# Patient Record
Sex: FEMALE | Race: WHITE | Employment: OTHER | URBAN - METROPOLITAN AREA
[De-identification: names, ages, dates, MRNs, and addresses within clinical notes are randomized per-mention and may not be internally consistent; named-entity substitution may affect disease eponyms.]

---

## 2017-01-07 DIAGNOSIS — G43.109 MIGRAINE WITH AURA: ICD-10-CM

## 2017-01-07 DIAGNOSIS — J30.9 ALLERGIC RHINITIS: ICD-10-CM

## 2017-01-07 DIAGNOSIS — J45.21 INTERMITTENT ASTHMA, WITH ACUTE EXACERBATION: ICD-10-CM

## 2017-01-07 DIAGNOSIS — J45.30 MILD PERSISTENT ASTHMA WITHOUT COMPLICATION: Primary | ICD-10-CM

## 2017-01-10 NOTE — TELEPHONE ENCOUNTER
SUMATRIPTAN      Last Written Prescription Date: 07/11/2016  Last Fill Quantity: 27, # refills: 0  Last Office Visit with [facility], [facility] or [facility] prescribing provider: 09/22/2016       BP Readings from Last 3 Encounters:   09/12/16 112/78   08/17/16 122/68   05/02/16 104/72       VERAPAMIL      Last Written Prescription Date: 08/07/2015  Last Fill Quantity: 90, # refills: 2  Last Office Visit with [facility], [facility] or [facility] prescribing provider: 09/22/2016       BP Readings from Last 3 Encounters:   09/12/16 112/78   08/17/16 122/68   05/02/16 104/72       MONTELIKAST       Last Written Prescription Date: 08/07/2015  Last Fill Quantity: 90, # refills: 2    Last Office Visit with [facility], [facility] or [facility] prescribing provider:  09/22/2016   Future Office Visit:       Date of Last Asthma Action Plan Letter:   Asthma Action Plan Q1 Year    Topic Date Due     Asthma Action Plan - yearly  04/25/2017      Asthma Control Test:   ACT Total Scores 9/12/2016   ACT TOTAL SCORE -   ASTHMA ER VISITS -   ASTHMA HOSPITALIZATIONS -   ACT TOTAL SCORE (Goal Greater than or Equal to 20) 21   In the past 12 months, how many times did you visit the emergency room for your asthma without being admitted to the hospital? 0   In the past 12 months, how many times were you hospitalized overnight because of your asthma? 0       Date of Last Spirometry Test:   No results found for this or any previous visit.    FLUTICASONE       Last Written Prescription Date: 08/07/2015  Last Fill Quantity: 90, # refills: 2    Last Office Visit with [facility], [facility] or [facility] prescribing provider:  09/22/2106   Future Office Visit:       Date of Last Asthma Action Plan Letter:   Asthma Action Plan Q1 Year    Topic Date Due     Asthma Action Plan - yearly  04/25/2017      Asthma Control Test:   ACT Total Scores 9/12/2016   ACT TOTAL SCORE -   ASTHMA ER VISITS -   ASTHMA HOSPITALIZATIONS -   ACT TOTAL SCORE (Goal Greater than or Equal to 20) 21   In the past 12 months, how many  times did you visit the emergency room for your asthma without being admitted to the hospital? 0   In the past 12 months, how many times were you hospitalized overnight because of your asthma? 0       Date of Last Spirometry Test:   No results found for this or any previous visit.    PROAIR INHALER       Last Written Prescription Date: 09/28/2015  Last Fill Quantity: 1 inhaler, # refills: 1    Last Office Visit with [facility], [facility] or [facility] prescribing provider:  09/22/2016   Future Office Visit:       Date of Last Asthma Action Plan Letter:   Asthma Action Plan Q1 Year    Topic Date Due     Asthma Action Plan - yearly  04/25/2017      Asthma Control Test:   ACT Total Scores 9/12/2016   ACT TOTAL SCORE -   ASTHMA ER VISITS -   ASTHMA HOSPITALIZATIONS -   ACT TOTAL SCORE (Goal Greater than or Equal to 20) 21   In the past 12 months, how many times did you visit the emergency room for your asthma without being admitted to the hospital? 0   In the past 12 months, how many times were you hospitalized overnight because of your asthma? 0       Date of Last Spirometry Test:   No results found for this or any previous visit.

## 2017-01-11 RX ORDER — SUMATRIPTAN 100 MG/1
TABLET, FILM COATED ORAL
Qty: 27 TABLET | Refills: 1 | Status: SHIPPED | OUTPATIENT
Start: 2017-01-11 | End: 2018-03-08

## 2017-01-11 RX ORDER — FLUTICASONE PROPIONATE 50 MCG
SPRAY, SUSPENSION (ML) NASAL
Qty: 16 G | Refills: 1 | Status: SHIPPED | OUTPATIENT
Start: 2017-01-11 | End: 2018-04-25

## 2017-01-11 RX ORDER — MONTELUKAST SODIUM 10 MG/1
TABLET ORAL
Qty: 90 TABLET | Refills: 1 | Status: SHIPPED | OUTPATIENT
Start: 2017-01-11 | End: 2017-06-17

## 2017-01-11 RX ORDER — VERAPAMIL HYDROCHLORIDE 240 MG/1
TABLET, FILM COATED, EXTENDED RELEASE ORAL
Qty: 90 TABLET | Refills: 1 | Status: SHIPPED | OUTPATIENT
Start: 2017-01-11 | End: 2017-06-17

## 2017-01-11 RX ORDER — ALBUTEROL SULFATE 90 UG/1
AEROSOL, METERED RESPIRATORY (INHALATION)
Qty: 25.5 G | Refills: 2 | Status: SHIPPED | OUTPATIENT
Start: 2017-01-11 | End: 2017-09-15

## 2017-02-07 ENCOUNTER — OFFICE VISIT (OUTPATIENT)
Dept: FAMILY MEDICINE | Facility: CLINIC | Age: 62
End: 2017-02-07
Payer: COMMERCIAL

## 2017-02-07 VITALS
BODY MASS INDEX: 34.72 KG/M2 | HEIGHT: 66 IN | TEMPERATURE: 98.2 F | DIASTOLIC BLOOD PRESSURE: 80 MMHG | HEART RATE: 73 BPM | WEIGHT: 216 LBS | SYSTOLIC BLOOD PRESSURE: 126 MMHG | OXYGEN SATURATION: 96 %

## 2017-02-07 DIAGNOSIS — J01.00 ACUTE NON-RECURRENT MAXILLARY SINUSITIS: Primary | ICD-10-CM

## 2017-02-07 DIAGNOSIS — Z71.89 ADVANCED DIRECTIVES, COUNSELING/DISCUSSION: ICD-10-CM

## 2017-02-07 DIAGNOSIS — Z12.31 VISIT FOR SCREENING MAMMOGRAM: ICD-10-CM

## 2017-02-07 PROCEDURE — 99213 OFFICE O/P EST LOW 20 MIN: CPT | Performed by: PHYSICIAN ASSISTANT

## 2017-02-07 RX ORDER — ALBUTEROL SULFATE 0.83 MG/ML
1 SOLUTION RESPIRATORY (INHALATION) EVERY 4 HOURS PRN
Qty: 60 VIAL | Refills: 3 | Status: SHIPPED | OUTPATIENT
Start: 2017-02-07 | End: 2018-03-22

## 2017-02-07 RX ORDER — AMOXICILLIN 500 MG/1
1000 CAPSULE ORAL 2 TIMES DAILY
Qty: 40 CAPSULE | Refills: 0 | Status: SHIPPED | OUTPATIENT
Start: 2017-02-07 | End: 2017-05-08

## 2017-02-07 NOTE — NURSING NOTE
"Chief Complaint   Patient presents with     Otalgia       Initial /80 mmHg  Pulse 73  Temp(Src) 98.2  F (36.8  C) (Oral)  Ht 5' 6\" (1.676 m)  Wt 216 lb (97.977 kg)  BMI 34.88 kg/m2  SpO2 96%  Breastfeeding? No Estimated body mass index is 34.88 kg/(m^2) as calculated from the following:    Height as of this encounter: 5' 6\" (1.676 m).    Weight as of this encounter: 216 lb (97.977 kg).  Medication Reconciliation: complete   Csaba Mlnarik CMA    "

## 2017-02-07 NOTE — PATIENT INSTRUCTIONS
Please followup if symptoms are not better as expected.  Please be seen immediately if symptoms worsen or change.      Take the albuterol every 4 hours as needed for wheezing, shortness of breath or cough.      Sinusitis (Antibiotic Treatment)    The sinuses are air-filled spaces within the bones of the face. They connect to the inside of the nose. Sinusitis is an inflammation of the tissue lining the sinus cavity. Sinus inflammation can occur during a cold. It can also be due to allergies to pollens and other particles in the air. Sinusitis can cause symptoms of sinus congestion and fullness. A sinus infection causes fever, headache and facial pain. There is often green or yellow drainage from the nose or into the back of the throat (post-nasal drip). You have been given antibiotics to treat this condition.  Home care:    Take the full course of antibiotics as instructed. Do not stop taking them, even if you feel better.    Drink plenty of water, hot tea, and other liquids. This may help thin mucus. It also may promote sinus drainage.    Heat may help soothe painful areas of the face. Use a towel soaked in hot water. Or,  the shower and direct the hot spray onto your face. Using a vaporizer along with a menthol rub at night may also help.     An expectorant containing guaifenesin may help thin the mucus and promote drainage from the sinuses.    Over-the-counter decongestants may be used unless a similar medicine was prescribed. Nasal sprays work the fastest. Use one that contains phenylephrine or oxymetazoline. First blow the nose gently. Then use the spray. Do not use these medicines more often than directed on the label or symptoms may get worse. You may also use tablets containing pseudoephedrine. Avoid products that combine ingredients, because side effects may be increased. Read labels. You can also ask the pharmacist for help. (NOTE: Persons with high blood pressure should not use decongestants.  They can raise blood pressure.)    Over-the-counter antihistamines may help if allergies contributed to your sinusitis.      Do not use nasal rinses or irrigation during an acute sinus infection, unless told to by your health care provider. Rinsing may spread the infection to other sinuses.    Use acetaminophen or ibuprofen to control pain, unless another pain medicine was prescribed. (If you have chronic liver or kidney disease or ever had a stomach ulcer, talk with your doctor before using these medicines. Aspirin should never be used in anyone under 18 years of age who is ill with a fever. It may cause severe liver damage.)    Don't smoke. This can worsen symptoms.  Follow-up care  Follow up with your healthcare provider or our staff if you are not improving within the next week.  When to seek medical advice  Call your healthcare provider if any of these occur:    Facial pain or headache becoming more severe    Stiff neck    Unusual drowsiness or confusion    Swelling of the forehead or eyelids    Vision problems, including blurred or double vision    Fever of 100.4 F (38 C) or higher, or as directed by your healthcare provider    Seizure    Breathing problems    Symptoms not resolving within 10 days    [id number] [employer]. [address] [place] [address]. All rights reserved. This information is not intended as a substitute for professional medical care. Always follow your healthcare professional's instructions.

## 2017-02-07 NOTE — PROGRESS NOTES
"  SUBJECTIVE:                                                    Tasneem Chan is a 61 year old female who presents to clinic today for the following health issues:      Acute Illness   Acute illness concerns: Ear pain  Onset: x4-5 days     Fever: no    Chills/Sweats: no    Headache (location?): YES- little when woke last 2 morning    Sinus Pressure:YES    Conjunctivitis:  YES- watery    Ear Pain: YES- Right - pain    Rhinorrhea: YES- yellow    Congestion: YES- sinus     Sore Throat: no - drainage     Cough: YES-non-productive, with shortness of breath last night    Wheeze: YES- off and on    Decreased Appetite: no    Nausea: no    Vomiting: no    Diarrhea:  no    Dysuria/Freq.: no    Fatigue/Achiness: no    Sick/Strep Exposure: YES- on airplane     Therapies Tried and outcome: Mucinex - no relief      Patient reports that the cold started last week, but has gotten worse in the last 48 hours.  She reports that she has facial pain now and her nasal drainage has changed color to a yellowish color.      She denies fevers, but has had chills.      She reports that this feels like her sinuses, but if she lets them go too long, her asthma flares up.      Patient reports that she used the albuterol twice yesterday and once today.    At baseline she uses it sometimes once a day and other times not at all.      Problem list and histories reviewed & adjusted, as indicated.  Additional history: as documented      ROS:  Constitutional, HEENT, cardiovascular, pulmonary, GI, , musculoskeletal, neuro, skin, endocrine and psych systems are negative, except as otherwise noted.    OBJECTIVE:                                                    /80 mmHg  Pulse 73  Temp(Src) 98.2  F (36.8  C) (Oral)  Ht 5' 6\" (1.676 m)  Wt 216 lb (97.977 kg)  BMI 34.88 kg/m2  SpO2 96%  Breastfeeding? No  Body mass index is 34.88 kg/(m^2).  GENERAL: healthy, alert and no distress  EYES: Eyes grossly normal to inspection, PERRL and " conjunctivae and sclerae normal  HENT: Right TM is mildly dull, nose and mouth without ulcers or lesions  NECK: no adenopathy, no asymmetry, masses, or scars and thyroid normal to palpation  RESP: lungs clear to auscultation - no rales, rhonchi or wheezes  CV: regular rate and rhythm, normal S1 S2, no S3 or S4, no murmur, click or rub, no peripheral edema and peripheral pulses strong  ABDOMEN: soft, nontender, no hepatosplenomegaly, no masses and bowel sounds normal  MS: no gross musculoskeletal defects noted, no edema  NEURO: Normal strength and tone, mentation intact and speech normal  PSYCH: mentation appears normal, affect normal/bright    Diagnostic Test Results:  none      ASSESSMENT/PLAN:                                                      Tasneem was seen today for otalgia.    Diagnoses and all orders for this visit:    Acute non-recurrent maxillary sinusitis  -     amoxicillin (AMOXIL) 500 MG capsule; Take 2 capsules (1,000 mg) by mouth 2 times daily  -     albuterol (2.5 MG/3ML) 0.083% neb solution; Take 1 vial (2.5 mg) by nebulization every 4 hours as needed for shortness of breath / dyspnea or wheezing    Visit for screening mammogram  -     MA SCREENING DIGITAL BILAT - Future  (s+30); Future    Advanced directives, counseling/discussion        See Patient Instructions        Jennifer Mcduffie PA-C    [facility]

## 2017-02-07 NOTE — MR AVS SNAPSHOT
After Visit Summary   2/7/2017    Tasneem Chan    MRN: [id number]           Patient Information     Date Of Birth          1955        Visit Information        Provider Department      2/7/2017 11:20 AM Jennifer Mcduffie PA-C [facility]        Today's Diagnoses     Screen for colon cancer    -  1     Visit for screening mammogram         Advanced directives, counseling/discussion         Acute non-recurrent maxillary sinusitis           Care Instructions      Please followup if symptoms are not better as expected.  Please be seen immediately if symptoms worsen or change.      Take the albuterol every 4 hours as needed for wheezing, shortness of breath or cough.      Sinusitis (Antibiotic Treatment)    The sinuses are air-filled spaces within the bones of the face. They connect to the inside of the nose. Sinusitis is an inflammation of the tissue lining the sinus cavity. Sinus inflammation can occur during a cold. It can also be due to allergies to pollens and other particles in the air. Sinusitis can cause symptoms of sinus congestion and fullness. A sinus infection causes fever, headache and facial pain. There is often green or yellow drainage from the nose or into the back of the throat (post-nasal drip). You have been given antibiotics to treat this condition.  Home care:    Take the full course of antibiotics as instructed. Do not stop taking them, even if you feel better.    Drink plenty of water, hot tea, and other liquids. This may help thin mucus. It also may promote sinus drainage.    Heat may help soothe painful areas of the face. Use a towel soaked in hot water. Or,  the shower and direct the hot spray onto your face. Using a vaporizer along with a menthol rub at night may also help.     An expectorant containing guaifenesin may help thin the mucus and promote drainage from the sinuses.    Over-the-counter decongestants may be used unless a similar  medicine was prescribed. Nasal sprays work the fastest. Use one that contains phenylephrine or oxymetazoline. First blow the nose gently. Then use the spray. Do not use these medicines more often than directed on the label or symptoms may get worse. You may also use tablets containing pseudoephedrine. Avoid products that combine ingredients, because side effects may be increased. Read labels. You can also ask the pharmacist for help. (NOTE: Persons with high blood pressure should not use decongestants. They can raise blood pressure.)    Over-the-counter antihistamines may help if allergies contributed to your sinusitis.      Do not use nasal rinses or irrigation during an acute sinus infection, unless told to by your health care provider. Rinsing may spread the infection to other sinuses.    Use acetaminophen or ibuprofen to control pain, unless another pain medicine was prescribed. (If you have chronic liver or kidney disease or ever had a stomach ulcer, talk with your doctor before using these medicines. Aspirin should never be used in anyone under 18 years of age who is ill with a fever. It may cause severe liver damage.)    Don't smoke. This can worsen symptoms.  Follow-up care  Follow up with your healthcare provider or our staff if you are not improving within the next week.  When to seek medical advice  Call your healthcare provider if any of these occur:    Facial pain or headache becoming more severe    Stiff neck    Unusual drowsiness or confusion    Swelling of the forehead or eyelids    Vision problems, including blurred or double vision    Fever of 100.4 F (38 C) or higher, or as directed by your healthcare provider    Seizure    Breathing problems    Symptoms not resolving within 10 days    [phone] [employer]. [address] [place] [address], [place] [address]. All rights reserved. This information is not intended as a substitute for professional medical care. Always follow your healthcare  professional's instructions.              Follow-ups after your visit        Your next 10 appointments already scheduled     Mar 08, 2017  9:00 AM   PHYS[facility] with Zayra [facility]ueva [facility]   [facility]    [address] [place] [id number]   [phone]              Future tests that were ordered for you today     Open Future Orders        Priority Expected Expires Ordered    MA SCREENING DIGITAL BILAT - Future  (s+30) Routine  2/7/2018 2/7/2017            Who to contact     If you have questions or need follow up information about today's clinic visit or your schedule please contact [facility] directly at [phone].  Normal or non-critical lab and imaging results will be communicated to you by [employer], letter or phone within 4 business days after the clinic has received the results. If you do not hear from us within 7 days, please contact the clinic through [employer] or phone. If you have a critical or abnormal lab result, we will notify you by phone as soon as possible.  Submit refill requests through [employer] or call your pharmacy and they will forward the refill request to us. Please allow 3 business days for your refill to be completed.          Additional Information About Your Visit        [employer] Information     [employer] gives you secure access to your electronic health record. If you see a primary care provider, you can also send messages to your care team and make appointments. If you have questions, please call your primary care clinic.  If you do not have a primary care provider, please call [phone] and they will assist you.        Care EveryWhere ID     This is your Care EveryWhere ID. This could be used by other organizations to access your [facility] medical records  [id number]        Your Vitals Were     Pulse Temperature Height BMI (Body Mass Index) Pulse Oximetry Breastfeeding?    73 98.2  F (36.8  C) (Oral) 5'  "6\" (1.676 m) 34.88 kg/m2 96% No       Blood Pressure from Last 3 Encounters:   02/07/17 126/80   09/12/16 112/78   08/17/16 122/68    Weight from Last 3 Encounters:   02/07/17 216 lb (97.977 kg)   09/12/16 214 lb 8 oz (97.297 kg)   08/17/16 214 lb 6 oz (97.24 kg)                 Today's Medication Changes          These changes are accurate as of: 2/7/17 11:56 AM.  If you have any questions, ask your nurse or doctor.               Start taking these medicines.        Dose/Directions    amoxicillin 500 MG capsule   Commonly known as:  AMOXIL   Used for:  Acute non-recurrent maxillary sinusitis   Started by:  Jennifer Mcduffie PA-C        Dose:  1000 mg   Take 2 capsules (1,000 mg) by mouth 2 times daily   Quantity:  40 capsule   Refills:  0            Where to get your medicines      These medications were sent to [facility] [place] [address]     Phone:  [phone]    - albuterol (2.5 MG/3ML) 0.083% neb solution  - amoxicillin 500 MG capsule             Primary Care Provider Office Phone # Fax #    Zayra Villanueva [phone]       [place] [phone]        Thank you!     Thank you for choosing [facility]  for your care. Our goal is always to provide you with excellent care. Hearing back from our patients is one way we can continue to improve our services. Please take a few minutes to complete the written survey that you may receive in the mail after your visit with us. Thank you!             Your Updated Medication List - Protect others around you: Learn how to safely use, store and throw away your medicines at www.disposemymeds.org.          This list is accurate as of: 2/7/17 11:56 AM.  Always use your most recent med list.                   Brand Name Dispense Instructions for use    * albuterol 108 (90 BASE) MCG/ACT Inhaler "   Generic drug:  albuterol     25.5 g    USE 1 TO 2 INHALATIONS EVERY 4 TO 6 HOURS AS NEEDED       * albuterol (2.5 MG/3ML) 0.083% neb solution     60 vial    Take 1 vial (2.5 mg) by nebulization every 4 hours as needed for shortness of breath / dyspnea or wheezing       amoxicillin 500 MG capsule    AMOXIL    40 capsule    Take 2 capsules (1,000 mg) by mouth 2 times daily       cetirizine 10 MG tablet    ZYRTEC    30 tablet    Take 1 tablet by mouth daily.       fluticasone 50 MCG/ACT spray    FLONASE    16 g    USE 1 TO 2 SPRAYS IN EACH NOSTRIL DAILY       gabapentin 300 MG capsule    NEURONTIN    90 capsule    TAKE 1 CAPSULE AT BEDTIME       mometasone-formoterol 200-5 MCG/ACT oral inhaler    DULERA    13 g    Inhale 2 puffs into the lungs 2 times daily       montelukast 10 MG tablet    SINGULAIR    90 tablet    TAKE 1 TABLET DAILY       * order for DME     1 Units    cpap machine and needed tubing and accessories       * order for DME     1 Units    Nebulizer machine Qty #1       SUMAtriptan 100 MG tablet    IMITREX    27 tablet    TAKE AS INSTRUCTED BY YOUR PRESCRIBER       verapamil 240 MG CR tablet    CALAN-SR    90 tablet    TAKE 1 TABLET AT BEDTIME       * Notice:  This list has 4 medication(s) that are the same as other medications prescribed for you. Read the directions carefully, and ask your doctor or other care provider to review them with you.

## 2017-05-08 ENCOUNTER — HOSPITAL ENCOUNTER (OUTPATIENT)
Dept: CT IMAGING | Facility: CLINIC | Age: 62
Discharge: HOME | End: 2017-05-08
Attending: PHYSICIAN ASSISTANT | Admitting: PHYSICIAN ASSISTANT
Payer: COMMERCIAL

## 2017-05-08 ENCOUNTER — OFFICE VISIT (OUTPATIENT)
Dept: FAMILY MEDICINE | Facility: CLINIC | Age: 62
End: 2017-05-08
Payer: COMMERCIAL

## 2017-05-08 VITALS
TEMPERATURE: 98.6 F | HEIGHT: 66 IN | OXYGEN SATURATION: 98 % | SYSTOLIC BLOOD PRESSURE: 130 MMHG | DIASTOLIC BLOOD PRESSURE: 82 MMHG | BODY MASS INDEX: 33.19 KG/M2 | HEART RATE: 88 BPM | WEIGHT: 206.5 LBS

## 2017-05-08 DIAGNOSIS — C50.911 MALIGNANT NEOPLASM OF RIGHT FEMALE BREAST, UNSPECIFIED SITE OF BREAST: ICD-10-CM

## 2017-05-08 DIAGNOSIS — R10.84 ABDOMINAL PAIN, GENERALIZED: ICD-10-CM

## 2017-05-08 DIAGNOSIS — K57.32 SIGMOID DIVERTICULITIS: Primary | ICD-10-CM

## 2017-05-08 DIAGNOSIS — R82.90 NONSPECIFIC FINDING ON EXAMINATION OF URINE: ICD-10-CM

## 2017-05-08 DIAGNOSIS — Z12.11 SCREEN FOR COLON CANCER: ICD-10-CM

## 2017-05-08 LAB
ALBUMIN SERPL-MCNC: 3.4 G/DL (ref 3.4–5)
ALBUMIN UR-MCNC: NEGATIVE MG/DL
ALP SERPL-CCNC: 82 U/L (ref 40–150)
ALT SERPL W P-5'-P-CCNC: 22 U/L (ref 0–50)
ANION GAP SERPL CALCULATED.3IONS-SCNC: 11 MMOL/L (ref 3–14)
APPEARANCE UR: CLEAR
AST SERPL W P-5'-P-CCNC: 14 U/L (ref 0–45)
BACTERIA #/AREA URNS HPF: ABNORMAL /HPF
BASOPHILS # BLD AUTO: 0.1 10E9/L (ref 0–0.2)
BASOPHILS NFR BLD AUTO: 0.4 %
BILIRUB SERPL-MCNC: 0.5 MG/DL (ref 0.2–1.3)
BILIRUB UR QL STRIP: NEGATIVE
BUN SERPL-MCNC: 11 MG/DL (ref 7–30)
CALCIUM SERPL-MCNC: 9.4 MG/DL (ref 8.5–10.1)
CHLORIDE SERPL-SCNC: 105 MMOL/L (ref 94–109)
CO2 SERPL-SCNC: 24 MMOL/L (ref 20–32)
COLOR UR AUTO: YELLOW
CREAT SERPL-MCNC: 0.54 MG/DL (ref 0.52–1.04)
CRP SERPL-MCNC: 28 MG/L (ref 0–8)
DIFFERENTIAL METHOD BLD: ABNORMAL
EOSINOPHIL # BLD AUTO: 0.3 10E9/L (ref 0–0.7)
EOSINOPHIL NFR BLD AUTO: 2 %
ERYTHROCYTE [DISTWIDTH] IN BLOOD BY AUTOMATED COUNT: 14.4 % (ref 10–15)
ERYTHROCYTE [SEDIMENTATION RATE] IN BLOOD BY WESTERGREN METHOD: 21 MM/H (ref 0–30)
GFR SERPL CREATININE-BSD FRML MDRD: NORMAL ML/MIN/1.7M2
GLUCOSE SERPL-MCNC: 99 MG/DL (ref 70–99)
GLUCOSE UR STRIP-MCNC: NEGATIVE MG/DL
HCT VFR BLD AUTO: 42.2 % (ref 35–47)
HGB BLD-MCNC: 13.7 G/DL (ref 11.7–15.7)
HGB UR QL STRIP: ABNORMAL
KETONES UR STRIP-MCNC: ABNORMAL MG/DL
LEUKOCYTE ESTERASE UR QL STRIP: ABNORMAL
LIPASE SERPL-CCNC: 72 U/L (ref 73–393)
LYMPHOCYTES # BLD AUTO: 2.7 10E9/L (ref 0.8–5.3)
LYMPHOCYTES NFR BLD AUTO: 19.1 %
MCH RBC QN AUTO: 27.8 PG (ref 26.5–33)
MCHC RBC AUTO-ENTMCNC: 32.5 G/DL (ref 31.5–36.5)
MCV RBC AUTO: 86 FL (ref 78–100)
MONOCYTES # BLD AUTO: 1.3 10E9/L (ref 0–1.3)
MONOCYTES NFR BLD AUTO: 9.4 %
NEUTROPHILS # BLD AUTO: 9.8 10E9/L (ref 1.6–8.3)
NEUTROPHILS NFR BLD AUTO: 69.1 %
NITRATE UR QL: NEGATIVE
NON-SQ EPI CELLS #/AREA URNS LPF: ABNORMAL /LPF
PH UR STRIP: 5.5 PH (ref 5–7)
PLATELET # BLD AUTO: 346 10E9/L (ref 150–450)
POTASSIUM SERPL-SCNC: 4.2 MMOL/L (ref 3.4–5.3)
PROT SERPL-MCNC: 7.6 G/DL (ref 6.8–8.8)
RBC # BLD AUTO: 4.92 10E12/L (ref 3.8–5.2)
RBC #/AREA URNS AUTO: ABNORMAL /HPF (ref 0–2)
SODIUM SERPL-SCNC: 140 MMOL/L (ref 133–144)
SP GR UR STRIP: 1.02 (ref 1–1.03)
URN SPEC COLLECT METH UR: ABNORMAL
UROBILINOGEN UR STRIP-ACNC: 0.2 EU/DL (ref 0.2–1)
WBC # BLD AUTO: 14.1 10E9/L (ref 4–11)
WBC #/AREA URNS AUTO: ABNORMAL /HPF (ref 0–2)

## 2017-05-08 PROCEDURE — 85025 COMPLETE CBC W/AUTO DIFF WBC: CPT | Performed by: PHYSICIAN ASSISTANT

## 2017-05-08 PROCEDURE — 36415 COLL VENOUS BLD VENIPUNCTURE: CPT | Performed by: PHYSICIAN ASSISTANT

## 2017-05-08 PROCEDURE — 25000128 H RX IP 250 OP 636: Performed by: RADIOLOGY

## 2017-05-08 PROCEDURE — 86140 C-REACTIVE PROTEIN: CPT | Performed by: PHYSICIAN ASSISTANT

## 2017-05-08 PROCEDURE — 80053 COMPREHEN METABOLIC PANEL: CPT | Performed by: PHYSICIAN ASSISTANT

## 2017-05-08 PROCEDURE — 81001 URINALYSIS AUTO W/SCOPE: CPT | Performed by: PHYSICIAN ASSISTANT

## 2017-05-08 PROCEDURE — 99214 OFFICE O/P EST MOD 30 MIN: CPT | Performed by: PHYSICIAN ASSISTANT

## 2017-05-08 PROCEDURE — 83690 ASSAY OF LIPASE: CPT | Performed by: PHYSICIAN ASSISTANT

## 2017-05-08 PROCEDURE — 25500064 ZZH RX 255 OP 636: Performed by: RADIOLOGY

## 2017-05-08 PROCEDURE — 85652 RBC SED RATE AUTOMATED: CPT | Performed by: PHYSICIAN ASSISTANT

## 2017-05-08 PROCEDURE — 74177 CT ABD & PELVIS W/CONTRAST: CPT

## 2017-05-08 PROCEDURE — 87086 URINE CULTURE/COLONY COUNT: CPT | Performed by: PHYSICIAN ASSISTANT

## 2017-05-08 RX ORDER — METRONIDAZOLE 500 MG/1
500 TABLET ORAL 3 TIMES DAILY
Qty: 42 TABLET | Refills: 0 | Status: SHIPPED | OUTPATIENT
Start: 2017-05-08 | End: 2017-05-22

## 2017-05-08 RX ORDER — SULFAMETHOXAZOLE/TRIMETHOPRIM 800-160 MG
1 TABLET ORAL 2 TIMES DAILY
Qty: 28 TABLET | Refills: 0 | Status: SHIPPED | OUTPATIENT
Start: 2017-05-08 | End: 2017-05-22

## 2017-05-08 RX ORDER — IOPAMIDOL 755 MG/ML
500 INJECTION, SOLUTION INTRAVASCULAR ONCE
Status: COMPLETED | OUTPATIENT
Start: 2017-05-08 | End: 2017-05-08

## 2017-05-08 RX ADMIN — IOPAMIDOL 100 ML: 755 INJECTION, SOLUTION INTRAVENOUS at 15:13

## 2017-05-08 RX ADMIN — SODIUM CHLORIDE 65 ML: 9 INJECTION, SOLUTION INTRAVENOUS at 15:13

## 2017-05-08 ASSESSMENT — ANXIETY QUESTIONNAIRES
1. FEELING NERVOUS, ANXIOUS, OR ON EDGE: NEARLY EVERY DAY
2. NOT BEING ABLE TO STOP OR CONTROL WORRYING: MORE THAN HALF THE DAYS
3. WORRYING TOO MUCH ABOUT DIFFERENT THINGS: MORE THAN HALF THE DAYS
7. FEELING AFRAID AS IF SOMETHING AWFUL MIGHT HAPPEN: NEARLY EVERY DAY
5. BEING SO RESTLESS THAT IT IS HARD TO SIT STILL: MORE THAN HALF THE DAYS
IF YOU CHECKED OFF ANY PROBLEMS ON THIS QUESTIONNAIRE, HOW DIFFICULT HAVE THESE PROBLEMS MADE IT FOR YOU TO DO YOUR WORK, TAKE CARE OF THINGS AT HOME, OR GET ALONG WITH OTHER PEOPLE: NOT DIFFICULT AT ALL
GAD7 TOTAL SCORE: 14
6. BECOMING EASILY ANNOYED OR IRRITABLE: NOT AT ALL

## 2017-05-08 ASSESSMENT — PATIENT HEALTH QUESTIONNAIRE - PHQ9: 5. POOR APPETITE OR OVEREATING: MORE THAN HALF THE DAYS

## 2017-05-08 NOTE — MR AVS SNAPSHOT
After Visit Summary   5/8/2017    Tasneem Chan    MRN: [id number]           Patient Information     Date Of Birth          1955        Visit Information        Provider Department      5/8/2017 11:40 AM Keisha Duong PA-C [facility] [place]        Today's Diagnoses     Abdominal pain, generalized    -  1    Nonspecific finding on examination of urine        Malignant neoplasm of right female breast, unspecified site of breast (H)           Follow-ups after your visit        Your next 10 appointments already scheduled     May 08, 2017  3:00 PM CDT   CT ABDOMEN PELVIS W CONTRAST with RHCT2   [facility] ([facility])    [address]  [facility] [id number] [phone]           Please bring any scans or X-rays taken at other hospitals, if similar tests were done. Also bring a list of your medicines, including vitamins, minerals and over-the-counter drugs. It is safest to leave personal items at home.  Be sure to tell your doctor:   If you have any allergies.   If there s any chance you are pregnant.   If you are breastfeeding.   If you have any special needs.  You may have contrast for this exam. To prepare:   Do not eat or drink for 2 hours before your exam. If you need to take medicine, you may take it with small sips of water. (We may ask you to take liquid medicine as well.)   The day before your exam, drink extra fluids at least six 8-ounce glasses (unless your doctor tells you to restrict your fluids).  Patients over 70 or patients with diabetes or kidney problems:   If you haven t had a blood test (creatinine test) within the last 30 days, go to your clinic or Diagnostic Imaging Department for this test.  If you have diabetes:   If your kidney function is normal, continue taking your metformin (Avandamet, Glucophage, Glucovance, Metaglip) on the day of your exam.   If your kidney function is abnormal, wait 48 hours before  restarting this medicine.  You will have oral contrast for this exam:   You will drink the contrast at home. Get this from your clinic or Diagnostic Imaging Department. Please follow the directions given.  Please wear loose clothing, such as a sweat suit or jogging clothes. Avoid snaps, zippers and other metal. We may ask you to undress and put on a hospital gown.  If you have any questions, please call the Imaging Department where you will have your exam.            May 30, 2017  9:30 AM CDT   New Visit with Irish Castro PA-C   [facility]    [address] [id number] [phone]              Future tests that were ordered for you today     Open Future Orders        Priority Expected Expires Ordered    CT Abdomen Pelvis w Contrast STAT  5/8/2018 5/8/2017            Who to contact     If you have questions or need follow up information about today's clinic visit or your schedule please contact [facility] directly at [phone].  Normal or non-critical lab and imaging results will be communicated to you by [employer], letter or phone within 4 business days after the clinic has received the results. If you do not hear from us within 7 days, please contact the clinic through bright boxt or phone. If you have a critical or abnormal lab result, we will notify you by phone as soon as possible.  Submit refill requests through [employer] or call your pharmacy and they will forward the refill request to us. Please allow 3 business days for your refill to be completed.          Additional Information About Your Visit        [employer] Information     [employer] gives you secure access to your electronic health record. If you see a primary care provider, you can also send messages to your care team and make appointments. If you have questions, please call your primary care clinic.  If you do not have a primary care provider, please  "call [phone] and they will assist you.        Care EveryWhere ID     This is your Care EveryWhere ID. This could be used by other organizations to access your [facility] medical records  [id number]        Your Vitals Were     Pulse Temperature Height Pulse Oximetry BMI (Body Mass Index)       88 98.6  F (37  C) (Tympanic) 5' 6\" (1.676 m) 98% 33.33 kg/m2        Blood Pressure from Last 3 Encounters:   05/08/17 130/82   02/07/17 126/80   09/12/16 112/78    Weight from Last 3 Encounters:   05/08/17 206 lb 8 oz (93.7 kg)   02/07/17 216 lb (98 kg)   09/12/16 214 lb 8 oz (97.3 kg)              We Performed the Following     *UA reflex to Microscopic and Culture ([place] [facility] (except [place] and [place])     CBC with platelets and differential     Comprehensive metabolic panel     CRP, inflammation     ESR: Erythrocyte sedimentation rate     Lipase     Urine Culture Aerobic Bacterial     Urine Microscopic        Primary Care Provider Office Phone # Fax #    Zayra Villanueva [phone]       [facility] [address] [facility] [address]        Thank you!     Thank you for choosing [facility]  for your care. Our goal is always to provide you with excellent care. Hearing back from our patients is one way we can continue to improve our services. Please take a few minutes to complete the written survey that you may receive in the mail after your visit with us. Thank you!             Your Updated Medication List - Protect others around you: Learn how to safely use, store and throw away your medicines at www.disposemymeds.org.          This list is accurate as of: 5/8/17  1:14 PM.  Always use your most recent med list.                   Brand Name Dispense Instructions for use    * albuterol 108 (90 BASE) MCG/ACT Inhaler   Generic drug:  albuterol     25.5 g    USE 1 TO 2 INHALATIONS EVERY 4 TO 6 HOURS AS NEEDED       * albuterol (2.5 MG/3ML) " 0.083% neb solution     60 vial    Take 1 vial (2.5 mg) by nebulization every 4 hours as needed for shortness of breath / dyspnea or wheezing       cetirizine 10 MG tablet    ZYRTEC    30 tablet    Take 1 tablet by mouth daily.       fluticasone 50 MCG/ACT spray    FLONASE    16 g    USE 1 TO 2 SPRAYS IN EACH NOSTRIL DAILY       gabapentin 300 MG capsule    NEURONTIN    90 capsule    TAKE 1 CAPSULE AT BEDTIME       mometasone-formoterol 200-5 MCG/ACT oral inhaler    DULERA    13 g    Inhale 2 puffs into the lungs 2 times daily       montelukast 10 MG tablet    SINGULAIR    90 tablet    TAKE 1 TABLET DAILY       * order for DME     1 Units    cpap machine and needed tubing and accessories       * order for DME     1 Units    Nebulizer machine Qty #1       SUMAtriptan 100 MG tablet    IMITREX    27 tablet    TAKE AS INSTRUCTED BY YOUR PRESCRIBER       verapamil 240 MG CR tablet    CALAN-SR    90 tablet    TAKE 1 TABLET AT BEDTIME       * Notice:  This list has 4 medication(s) that are the same as other medications prescribed for you. Read the directions carefully, and ask your doctor or other care provider to review them with you.

## 2017-05-08 NOTE — NURSING NOTE
"Chief Complaint   Patient presents with     Abdominal Pain     lower left abdominal pain ~4 days        Initial /82  Pulse 88  Temp 98.6  F (37  C) (Tympanic)  Ht 5' 6\" (1.676 m)  Wt 206 lb 8 oz (93.7 kg)  SpO2 98%  BMI 33.33 kg/m2 Estimated body mass index is 33.33 kg/(m^2) as calculated from the following:    Height as of this encounter: 5' 6\" (1.676 m).    Weight as of this encounter: 206 lb 8 oz (93.7 kg).  Medication Reconciliation: complete   Oliva Mann, HELGA      "

## 2017-05-08 NOTE — PROGRESS NOTES
SUBJECTIVE:                                                    Tasneem Chan is a 61 year old female who presents to clinic today for the following health issues:    Abdominal Pain  Tasneem presents to clinic today with chief complaint of abdominal pain. Onset of symptoms was . Symptoms have been worsening since onset. Describes LLQ pain that is constant. Symptoms worsen with activity or lying flat - though improve if lying on side. Reports some increase in urgency with urination as well. Denies other urinary symptoms, nausea, change in bowel movements, vaginal symptoms, or history of kidney stones. Does admits to recent travel to [place] last week when she ate out often. Has tried OTC antacids with little improvement of symptoms. The patient reports she does have history significant for diverticulitis but states that abdominal pain today does not feel like diverticulitis. Patient has never had colonoscopy for preventative health.    Problem list and histories reviewed & adjusted, as indicated.  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     Left ankle injury, initial encounter     Left knee pain     Advanced directives, counseling/discussion     Past Surgical History:   Procedure Laterality Date     BREAST LUMPECTOMY, RT/LT      RT      SECTION       SURGICAL HISTORY OF -       Rt Knee spur removal       Social History   Substance Use Topics     Smoking status: Never Smoker     Smokeless tobacco: Never Used     Alcohol use No     Family History   Problem Relation Age of Onset     Cardiovascular Mother      rheumatic fever, irregular rhythm      Pulmonary Embolism Father 61     2 weeks after hernia surgery     HEART DISEASE Sister      heart block         Current Outpatient Prescriptions   Medication Sig Dispense Refill     metroNIDAZOLE (FLAGYL) 500 MG tablet Take 1 tablet (500 mg) by mouth 3 times daily for 14 days 42 tablet 0     sulfamethoxazole-trimethoprim (BACTRIM DS/SEPTRA DS) 800-160 MG per tablet Take 1 tablet by mouth 2 times daily for 14 days 28 tablet 0     albuterol (2.5 MG/3ML) 0.083% neb solution Take 1 vial (2.5 mg) by nebulization every 4 hours as needed for shortness of breath / dyspnea or wheezing 60 vial 3     ALBUTEROL 108 (90 BASE) MCG/ACT inhaler USE 1 TO 2 INHALATIONS EVERY 4 TO 6 HOURS AS NEEDED 25.5 g 2     SUMAtriptan (IMITREX) 100 MG tablet TAKE AS INSTRUCTED BY YOUR PRESCRIBER 27 tablet 1     verapamil (CALAN-SR) 240 MG CR tablet TAKE 1 TABLET AT BEDTIME 90 tablet 1     fluticasone (FLONASE) 50 MCG/ACT spray USE 1 TO 2 SPRAYS IN EACH NOSTRIL DAILY 16 g 1     montelukast (SINGULAIR) 10 MG tablet TAKE 1 TABLET DAILY 90 tablet 1     gabapentin (NEURONTIN) 300 MG capsule TAKE 1 CAPSULE AT BEDTIME 90 capsule 1     order for DME Nebulizer machine Qty #1 1 Units 1     mometasone-formoterol (DULERA) 200-5 MCG/ACT oral inhaler Inhale 2 puffs into the lungs 2 times daily 13 g 0     ORDER FOR DME cpap machine and needed tubing and accessories 1 Units 1     cetirizine (ZYRTEC) 10 MG tablet Take 1 tablet by mouth daily. 30 tablet 11     Allergies   Allergen Reactions     Levaquin [Levofloxacin Hemihydrate]      diarrhea       Reviewed and updated as needed this visit by clinical staff  Tobacco  Allergies  Meds  Problems  Med Hx  Surg Hx  Fam Hx  Soc Hx        Reviewed and updated as needed this visit by Provider  Tobacco  Allergies  Meds  Problems  Med Hx  Surg Hx  Fam Hx  Soc Hx          ROS:  Constitutional, HEENT, cardiovascular, pulmonary, GI, , musculoskeletal, neuro, skin, endocrine and psych systems are negative,  "except as otherwise noted.    This document serves as a record of the services and decisions personally performed and made by Keisha Duong PA-C. It was created on her behalf by Nydia Gloria, a trained medical scribe. The creation of this document is based the provider's statements to the medical scribe.  Nydia Gloria, May 8, 2017 12:03 PM    OBJECTIVE:                                                    /82  Pulse 88  Temp 98.6  F (37  C) (Tympanic)  Ht 5' 6\" (1.676 m)  Wt 206 lb 8 oz (93.7 kg)  SpO2 98%  BMI 33.33 kg/m2  Body mass index is 33.33 kg/(m^2).     GENERAL: healthy, alert and no distress  RESP: lungs clear to auscultation - no rales, rhonchi or wheezes  CV: regular rate and rhythm, normal S1 S2, no S3 or S4, no murmur, click or rub, no peripheral edema and peripheral pulses strong  ABDOMEN: soft, LLQ tenderness, significant hepatomegaly palpable, no masses and bowel sounds normal, no CVA tenderness  NEURO: Normal strength and tone, mentation intact and speech normal  PSYCH: mentation appears normal, affect normal/bright    Diagnostic Test Results:  Results for orders placed or performed in visit on 05/08/17 (from the past 24 hour(s))   *UA reflex to Microscopic and Culture ([place] [facility] (except [place] and [place])   Result Value Ref Range    Color Urine Yellow     Appearance Urine Clear     Glucose Urine Negative NEG mg/dL    Bilirubin Urine Negative NEG    Ketones Urine Trace (A) NEG mg/dL    Specific Gravity Urine 1.020 1.003 - 1.035    Blood Urine Trace (A) NEG    pH Urine 5.5 5.0 - 7.0 pH    Protein Albumin Urine Negative NEG mg/dL    Urobilinogen Urine 0.2 0.2 - 1.0 EU/dL    Nitrite Urine Negative NEG    Leukocyte Esterase Urine Small (A) NEG    Source Midstream Urine    Urine Microscopic   Result Value Ref Range    WBC Urine 10-25 (A) 0 - 2 /HPF    RBC Urine O - 2 0 - 2 /HPF    Squamous Epithelial /LPF Urine Few FEW /LPF    Bacteria Urine Few (A) NEG /HPF   CBC with " platelets and differential   Result Value Ref Range    WBC 14.1 (H) 4.0 - 11.0 10e9/L    RBC Count 4.92 3.8 - 5.2 10e12/L    Hemoglobin 13.7 11.7 - 15.7 g/dL    Hematocrit 42.2 35.0 - 47.0 %    MCV 86 78 - 100 fl    MCH 27.8 26.5 - 33.0 pg    MCHC 32.5 31.5 - 36.5 g/dL    RDW 14.4 10.0 - 15.0 %    Platelet Count 346 150 - 450 10e9/L    Diff Method Automated Method     % Neutrophils 69.1 %    % Lymphocytes 19.1 %    % Monocytes 9.4 %    % Eosinophils 2.0 %    % Basophils 0.4 %    Absolute Neutrophil 9.8 (H) 1.6 - 8.3 10e9/L    Absolute Lymphocytes 2.7 0.8 - 5.3 10e9/L    Absolute Monocytes 1.3 0.0 - 1.3 10e9/L    Absolute Eosinophils 0.3 0.0 - 0.7 10e9/L    Absolute Basophils 0.1 0.0 - 0.2 10e9/L   ESR: Erythrocyte sedimentation rate   Result Value Ref Range    Sed Rate 21 0 - 30 mm/h        CT: sigmoid diverticulitis w/o perforation    ASSESSMENT/PLAN:                                                    Tasneem was seen today for abdominal pain.    Diagnoses and all orders for this visit:    Tasneem was seen today for abdominal pain.    Diagnoses and all orders for this visit:    Sigmoid diverticulitis, Abdominal pain, generalized, Nonspecific finding on examination of urine  -     metroNIDAZOLE (FLAGYL) 500 MG tablet; Take 1 tablet (500 mg) by mouth 3 times daily for 14 days  -     sulfamethoxazole-trimethoprim (BACTRIM DS/SEPTRA DS) 800-160 MG per tablet; Take 1 tablet by mouth 2 times daily for 14 days  -     *UA reflex to Microscopic and Culture ([place] [facility] (except [place] and [employer])  -     Urine Microscopic  -     CBC with platelets and differential  -     ESR: Erythrocyte sedimentation rate  -     CRP, inflammation  -     Comprehensive metabolic panel  -     CT Abdomen Pelvis w Contrast; Future  -     Lipase  -     Urine Culture Aerobic Bacterial      The information in this document, created by the medical scribe for me, accurately reflects the services I personally performed and the  decisions made by me. I have reviewed and approved this document for accuracy prior to leaving the patient care area .  Keisha Duong PA-C May 8, 2017 12:03 PM    Keisha Duong PA-C  [facility] [place]

## 2017-05-08 NOTE — PROGRESS NOTES
The patient was called with results.    Keisha Duong, MS, PA-C  [facility]

## 2017-05-09 LAB
BACTERIA SPEC CULT: NO GROWTH
MICRO REPORT STATUS: NORMAL
SPECIMEN SOURCE: NORMAL

## 2017-05-09 ASSESSMENT — PATIENT HEALTH QUESTIONNAIRE - PHQ9: SUM OF ALL RESPONSES TO PHQ QUESTIONS 1-9: 4

## 2017-05-09 ASSESSMENT — ASTHMA QUESTIONNAIRES: ACT_TOTALSCORE: 20

## 2017-05-09 ASSESSMENT — ANXIETY QUESTIONNAIRES: GAD7 TOTAL SCORE: 14

## 2017-05-19 DIAGNOSIS — S99.912A LEFT ANKLE INJURY, INITIAL ENCOUNTER: ICD-10-CM

## 2017-05-19 DIAGNOSIS — M25.562 CHRONIC PAIN OF LEFT KNEE: ICD-10-CM

## 2017-05-19 DIAGNOSIS — G89.29 CHRONIC PAIN OF LEFT KNEE: ICD-10-CM

## 2017-05-22 RX ORDER — GABAPENTIN 300 MG/1
CAPSULE ORAL
Qty: 90 CAPSULE | Refills: 0 | Status: SHIPPED | OUTPATIENT
Start: 2017-05-22 | End: 2017-08-20

## 2017-05-22 NOTE — TELEPHONE ENCOUNTER
Controlled Substance Refill Request for Gabapentin  Problem List Complete:  Yes    Last Written Prescription Date:  11/21/2016  Last Fill Quantity: 90,   # refills: 1    Last Office Visit with [facility] primary care provider: 05/08/2017    Clinic visit frequency required: None noted     Future Office visit:     Controlled substance agreement on file: No.     Processing:  Fax Rx to [facility]   checked in past 6 months?  No, route to RN

## 2017-05-22 NOTE — TELEPHONE ENCOUNTER
Gabapentin      Last Written Prescription Date: 11/20/2016  Last Fill Quantity: 90,  # refills: 1   Last Office Visit with [facility], [facility] or [facility] prescribing provider: 05/08/2017

## 2017-06-17 DIAGNOSIS — J45.21 INTERMITTENT ASTHMA, WITH ACUTE EXACERBATION: ICD-10-CM

## 2017-06-17 DIAGNOSIS — J45.30 MILD PERSISTENT ASTHMA WITHOUT COMPLICATION: ICD-10-CM

## 2017-06-17 DIAGNOSIS — G43.109 MIGRAINE WITH AURA: ICD-10-CM

## 2017-06-19 RX ORDER — MONTELUKAST SODIUM 10 MG/1
TABLET ORAL
Qty: 90 TABLET | Refills: 3 | Status: SHIPPED | OUTPATIENT
Start: 2017-06-19 | End: 2018-06-14

## 2017-06-19 RX ORDER — VERAPAMIL HYDROCHLORIDE 240 MG/1
TABLET, FILM COATED, EXTENDED RELEASE ORAL
Qty: 90 TABLET | Refills: 3 | Status: SHIPPED | OUTPATIENT
Start: 2017-06-19 | End: 2018-06-13

## 2017-06-19 NOTE — TELEPHONE ENCOUNTER
Singulair       Last Written Prescription Date: 01/11/2017  Last Fill Quantity: 90, # refills: 1    Last Office Visit with [facility] provider:  05/08/2017   Future Office Visit:       Date of Last Asthma Action Plan Letter:   Asthma Action Plan Q1 Year    Topic Date Due     Asthma Action Plan - yearly  04/25/2017      Asthma Control Test:   ACT Total Scores 5/8/2017   ACT TOTAL SCORE -   ASTHMA ER VISITS -   ASTHMA HOSPITALIZATIONS -   ACT TOTAL SCORE (Goal Greater than or Equal to 20) 20   In the past 12 months, how many times did you visit the emergency room for your asthma without being admitted to the hospital? 0   In the past 12 months, how many times were you hospitalized overnight because of your asthma? 0       Date of Last Spirometry Test:   No results found for this or any previous visit.    Verapamil      Last Written Prescription Date: 01/11/2017  Last Fill Quantity: 90, # refills: 1  Last Office Visit with [facility] or  [facility] prescribing provider: 05/08/2017       Potassium   Date Value Ref Range Status   05/08/2017 4.2 3.4 - 5.3 mmol/L Final     Creatinine   Date Value Ref Range Status   05/08/2017 0.54 0.52 - 1.04 mg/dL Final     BP Readings from Last 3 Encounters:   05/08/17 130/82   02/07/17 126/80   09/12/16 112/78

## 2017-06-19 NOTE — TELEPHONE ENCOUNTER
Prescription approved per [facility] Refill Protocol.    Noemi Villanueva, BARBIE, RN, PHN  [facility]

## 2017-07-20 ENCOUNTER — OFFICE VISIT (OUTPATIENT)
Dept: FAMILY MEDICINE | Facility: CLINIC | Age: 62
End: 2017-07-20
Payer: COMMERCIAL

## 2017-07-20 VITALS
SYSTOLIC BLOOD PRESSURE: 120 MMHG | HEART RATE: 86 BPM | TEMPERATURE: 98.2 F | BODY MASS INDEX: 33.11 KG/M2 | OXYGEN SATURATION: 97 % | HEIGHT: 66 IN | DIASTOLIC BLOOD PRESSURE: 78 MMHG | WEIGHT: 206 LBS

## 2017-07-20 DIAGNOSIS — M54.6 ACUTE MIDLINE THORACIC BACK PAIN: Primary | ICD-10-CM

## 2017-07-20 PROCEDURE — 99213 OFFICE O/P EST LOW 20 MIN: CPT | Performed by: PHYSICIAN ASSISTANT

## 2017-07-20 RX ORDER — PREDNISONE 20 MG/1
TABLET ORAL
Qty: 20 TABLET | Refills: 0 | Status: SHIPPED | OUTPATIENT
Start: 2017-07-20 | End: 2018-03-22

## 2017-07-20 RX ORDER — CYCLOBENZAPRINE HCL 10 MG
TABLET ORAL
Qty: 20 TABLET | Refills: 0 | Status: SHIPPED | OUTPATIENT
Start: 2017-07-20 | End: 2018-04-25

## 2017-07-20 NOTE — NURSING NOTE
"Chief Complaint   Patient presents with     Musculoskeletal Problem     been non stop pain constant-- couple months ago       Initial /78 (BP Location: Left arm, Patient Position: Chair, Cuff Size: Adult Large)  Pulse 86  Temp 98.2  F (36.8  C) (Oral)  Ht 5' 6\" (1.676 m)  Wt 206 lb (93.4 kg)  SpO2 97%  BMI 33.25 kg/m2 Estimated body mass index is 33.25 kg/(m^2) as calculated from the following:    Height as of this encounter: 5' 6\" (1.676 m).    Weight as of this encounter: 206 lb (93.4 kg).  Medication Reconciliation: complete    "

## 2017-07-20 NOTE — MR AVS SNAPSHOT
After Visit Summary   7/20/2017    Tasneem Chan    MRN: [id number]           Patient Information     Date Of Birth          1955        Visit Information        Provider Department      7/20/2017 2:40 PM Keisha Duong PA-C [facility]        Today's Diagnoses     Acute midline thoracic back pain    -  1       Follow-ups after your visit        Additional Services     [facility] PT, HAND, AND CHIROPRACTIC REFERRAL       **This order will print in the [facility] Scheduling Office**    Physical Therapy, Hand Therapy and Chiropractic Care are available through:    *[facility]  *[facility]  *[facility]    Call one number to schedule at any of the above locations: [phone].    Your provider has referred you to: [facility] - PT and/or Chiropractic Care determined by clinical presentation at [facility] or [facility] Initial Visit    Indication/Reason for Referral: Low Back Pain  Onset of Illness: none  Therapy Orders: Evaluate and Treat  Special Programs: None  Special Request: None    Evelyn Rivas      Additional Comments for the Therapist or Chiropractor: none    Please be aware that coverage of these services is subject to the terms and limitations of your health insurance plan.  Call member services at your health plan with any benefit or coverage questions.      Please bring the following to your appointment:    *Your personal calendar for scheduling future appointments  *Comfortable clothing                  Who to contact     If you have questions or need follow up information about today's clinic visit or your schedule please contact [facility] directly at [phone].  Normal or non-critical lab and imaging results will be communicated to you by MyChart, letter or phone within 4 business days after the clinic has received the results. If you do not hear from us within 7 days, please contact the  "clinic through [employer] or phone. If you have a critical or abnormal lab result, we will notify you by phone as soon as possible.  Submit refill requests through [employer] or call your pharmacy and they will forward the refill request to us. Please allow 3 business days for your refill to be completed.          Additional Information About Your Visit        TRUE linkswearharColovore Information     [employer] gives you secure access to your electronic health record. If you see a primary care provider, you can also send messages to your care team and make appointments. If you have questions, please call your primary care clinic.  If you do not have a primary care provider, please call [phone] and they will assist you.        Care EveryWhere ID     This is your Care EveryWhere ID. This could be used by other organizations to access your [facility] medical records  [id number]        Your Vitals Were     Pulse Temperature Height Pulse Oximetry BMI (Body Mass Index)       86 98.2  F (36.8  C) (Oral) 5' 6\" (1.676 m) 97% 33.25 kg/m2        Blood Pressure from Last 3 Encounters:   07/20/17 120/78   05/08/17 130/82   02/07/17 126/80    Weight from Last 3 Encounters:   07/20/17 206 lb (93.4 kg)   05/08/17 206 lb 8 oz (93.7 kg)   02/07/17 216 lb (98 kg)              We Performed the Following     [facility] PT, HAND, AND CHIROPRACTIC REFERRAL          Today's Medication Changes          These changes are accurate as of: 7/20/17  3:21 PM.  If you have any questions, ask your nurse or doctor.               Start taking these medicines.        Dose/Directions    cyclobenzaprine 10 MG tablet   Commonly known as:  FLEXERIL   Used for:  Acute midline thoracic back pain   Started by:  Keisha Duong PA-C        1/2 tab during the day and 1 tab at bedtime as needed for muscle spasms   Quantity:  20 tablet   Refills:  0       predniSONE 20 MG tablet   Commonly known as:  DELTASONE   Used for:  Acute midline thoracic back pain   Started by:  Keisha Duong " VIANCA Malloy        Take 60 mg daily for 3 days, then 40 mg daily for 3 days, then 20 mg daily for 3 days, then 10 mg for 4 days   Quantity:  20 tablet   Refills:  0            Where to get your medicines      These medications were sent to [facility] - [place], [place] - [address]  [address], [facility] [address]     Phone:  [phone]     cyclobenzaprine 10 MG tablet    predniSONE 20 MG tablet                Primary Care Provider Office Phone # Fax #    Zayra Villanueva [phone]       [facility] [address] [facility] [address]        Equal Access to Services     ODILON ROSAS : Hadii aad ku hadasho Sotru, waaxda luqadaha, qaybta kaalmada adeegyada, dustin chau. So [facility] [place] [phone].    ATENCIÓN: Si habla español, tiene a curtis disposición servicios gratuitos de asistencia lingüística. Llame al [phone].    We comply with applicable federal civil rights laws and [place] laws. We do not discriminate on the basis of race, color, national origin, age, disability sex, sexual orientation or gender identity.            Thank you!     Thank you for choosing [facility]  for your care. Our goal is always to provide you with excellent care. Hearing back from our patients is one way we can continue to improve our services. Please take a few minutes to complete the written survey that you may receive in the mail after your visit with us. Thank you!             Your Updated Medication List - Protect others around you: Learn how to safely use, store and throw away your medicines at www.disposemymeds.org.          This list is accurate as of: 7/20/17  3:21 PM.  Always use your most recent med list.                   Brand Name Dispense Instructions for use Diagnosis    * albuterol 108 (90 BASE) MCG/ACT Inhaler   Generic drug:  albuterol     25.5 g    USE 1 TO 2 INHALATIONS EVERY 4  TO 6 HOURS AS NEEDED    Mild persistent asthma without complication, Intermittent asthma, with acute exacerbation       * albuterol (2.5 MG/3ML) 0.083% neb solution     60 vial    Take 1 vial (2.5 mg) by nebulization every 4 hours as needed for shortness of breath / dyspnea or wheezing    Acute non-recurrent maxillary sinusitis       cetirizine 10 MG tablet    ZYRTEC    30 tablet    Take 1 tablet by mouth daily.    Rash       cyclobenzaprine 10 MG tablet    FLEXERIL    20 tablet    1/2 tab during the day and 1 tab at bedtime as needed for muscle spasms    Acute midline thoracic back pain       fluticasone 50 MCG/ACT spray    FLONASE    16 g    USE 1 TO 2 SPRAYS IN EACH NOSTRIL DAILY    Allergic rhinitis       gabapentin 300 MG capsule    NEURONTIN    90 capsule    TAKE 1 CAPSULE AT BEDTIME    Left ankle injury, initial encounter, Chronic pain of left knee       mometasone-formoterol 200-5 MCG/ACT oral inhaler    DULERA    13 g    Inhale 2 puffs into the lungs 2 times daily    Mild persistent asthma, with acute exacerbation       montelukast 10 MG tablet    SINGULAIR    90 tablet    TAKE 1 TABLET DAILY    Mild persistent asthma without complication, Intermittent asthma, with acute exacerbation       * order for DME     1 Units    cpap machine and needed tubing and accessories    SUSHIL (obstructive sleep apnea)       * order for DME     1 Units    Nebulizer machine Qty #1    Asthma exacerbation, moderate persistent       predniSONE 20 MG tablet    DELTASONE    20 tablet    Take 60 mg daily for 3 days, then 40 mg daily for 3 days, then 20 mg daily for 3 days, then 10 mg for 4 days    Acute midline thoracic back pain       SUMAtriptan 100 MG tablet    IMITREX    27 tablet    TAKE AS INSTRUCTED BY YOUR PRESCRIBER    Migraine with aura       verapamil 240 MG CR tablet    CALAN-SR    90 tablet    TAKE 1 TABLET AT BEDTIME    Migraine with aura       * Notice:  This list has 4 medication(s) that are the same as other  medications prescribed for you. Read the directions carefully, and ask your doctor or other care provider to review them with you.

## 2017-07-20 NOTE — PROGRESS NOTES
"  SUBJECTIVE:                                                    Tasneem Chan is a 61 year old female who presents to clinic today for the following health issues:    Back pain  The patient, PMH of back issues including arthritis and bulging discs, reports a gradual onset of \"tight\" back pain radiating down her back, sometimes shooting down her right leg a couple of months ago with associated leg weakness. She presents today after the pain has worsened in severity over the past 10 days. She denies ever having these symptoms in the past. She has tried treatment including aleve (2 tablets every 6 hours) and pain medication from previous brain tumor surgery which helped to alleviate the pain. She has not seen chiropractic or PT services for this in the past. Sitting up straight alleviates her symptoms. Leaning back, driving, exacerbates her symptoms. She denies any fever, rash, warmth, nausea, vomiting, bowel or bladder incontinence, or any other related symptoms or complaints.       Problem list and histories reviewed & adjusted, as indicated.  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     Left ankle injury, initial encounter     Left knee pain     Advanced directives, counseling/discussion     Past Surgical History:   Procedure Laterality Date     BREAST LUMPECTOMY, RT/LT      RT      SECTION       SURGICAL HISTORY OF -       Rt Knee spur removal       Social History   Substance Use Topics     Smoking status: Never Smoker     Smokeless tobacco: Never Used     Alcohol use No     Family History   Problem Relation Age of Onset     Cardiovascular Mother      rheumatic " "fever, irregular rhythm     Pulmonary Embolism Father 61     2 weeks after hernia surgery     HEART DISEASE Sister      heart block         Current Outpatient Prescriptions   Medication Sig Dispense Refill     cyclobenzaprine (FLEXERIL) 10 MG tablet 1/2 tab during the day and 1 tab at bedtime as needed for muscle spasms 20 tablet 0     predniSONE (DELTASONE) 20 MG tablet Take 60 mg daily for 3 days, then 40 mg daily for 3 days, then 20 mg daily for 3 days, then 10 mg for 4 days 20 tablet 0     montelukast (SINGULAIR) 10 MG tablet TAKE 1 TABLET DAILY 90 tablet 3     verapamil (CALAN-SR) 240 MG CR tablet TAKE 1 TABLET AT BEDTIME 90 tablet 3     gabapentin (NEURONTIN) 300 MG capsule TAKE 1 CAPSULE AT BEDTIME 90 capsule 0     albuterol (2.5 MG/3ML) 0.083% neb solution Take 1 vial (2.5 mg) by nebulization every 4 hours as needed for shortness of breath / dyspnea or wheezing 60 vial 3     ALBUTEROL 108 (90 BASE) MCG/ACT inhaler USE 1 TO 2 INHALATIONS EVERY 4 TO 6 HOURS AS NEEDED 25.5 g 2     SUMAtriptan (IMITREX) 100 MG tablet TAKE AS INSTRUCTED BY YOUR PRESCRIBER 27 tablet 1     fluticasone (FLONASE) 50 MCG/ACT spray USE 1 TO 2 SPRAYS IN EACH NOSTRIL DAILY 16 g 1     order for DME Nebulizer machine Qty #1 1 Units 1     mometasone-formoterol (DULERA) 200-5 MCG/ACT oral inhaler Inhale 2 puffs into the lungs 2 times daily 13 g 0     ORDER FOR DME cpap machine and needed tubing and accessories 1 Units 1     cetirizine (ZYRTEC) 10 MG tablet Take 1 tablet by mouth daily. 30 tablet 11     Allergies   Allergen Reactions     Levaquin [Levofloxacin Hemihydrate]      diarrhea     Labs reviewed in EPIC      ROS:  Constitutional, HEENT, cardiovascular, pulmonary, GI, , musculoskeletal, neuro, skin, endocrine and psych systems are negative, except as otherwise noted.      OBJECTIVE:   /78 (BP Location: Left arm, Patient Position: Chair, Cuff Size: Adult Large)  Pulse 86  Temp 98.2  F (36.8  C) (Oral)  Ht 5' 6\" (1.676 m)  " Wt 206 lb (93.4 kg)  SpO2 97%  BMI 33.25 kg/m2  Body mass index is 33.25 kg/(m^2).  GENERAL: healthy, alert and no distress  NECK: no adenopathy, no asymmetry, masses, or scars and thyroid normal to palpation  MS: FROM. TTP of sciatic notch and over T7-T11 spinous processes. Pain in upper right-sided perispinal muscle when rotating torso to left. Positive straight leg test on right. no gross musculoskeletal defects noted, no edema  NEURO: Normal strength and tone, mentation intact and speech normal  PSYCH: mentation appears normal, affect normal/bright    Diagnostic Test Results:  none     ASSESSMENT/PLAN:       Tasneem was seen today for musculoskeletal problem.    Diagnoses and all orders for this visit:    Acute midline thoracic back pain; Patient was advised to see chiropractic referral on top of flexeril and prednisone if not improving after 10 days. Advised to do gentle stretching and using heat (max 20 mins at a time).  -     cyclobenzaprine (FLEXERIL) 10 MG tablet; 1/2 tab during the day and 1 tab at bedtime as needed for muscle spasms  -     predniSONE (DELTASONE) 20 MG tablet; Take 60 mg daily for 3 days, then 40 mg daily for 3 days, then 20 mg daily for 3 days, then 10 mg for 4 days  -     [facility] PT, HAND, AND CHIROPRACTIC REFERRAL    Follow up with any new or worsening symptoms    Keisha Duong PA-C  [facility]

## 2017-07-21 ASSESSMENT — ASTHMA QUESTIONNAIRES: ACT_TOTALSCORE: 18

## 2017-08-09 ENCOUNTER — TELEPHONE (OUTPATIENT)
Dept: FAMILY MEDICINE | Facility: CLINIC | Age: 62
End: 2017-08-09

## 2017-08-09 NOTE — TELEPHONE ENCOUNTER
Reason for Call:  Forms    Detailed comments: The patient is calling needing a pre-certification for an MRI of the head and brain with and without contrast. She says she cannot make the appt without one.    Phone Number Patient can be reached at: Cell number on file:    Telephone Information:   Mobile [phone]     Best Time: Anytime    Can we leave a detailed message on this number? YES    Call taken on 8/9/2017 at 11:53 AM by Neha Poe

## 2017-08-20 DIAGNOSIS — M25.562 CHRONIC PAIN OF LEFT KNEE: ICD-10-CM

## 2017-08-20 DIAGNOSIS — S99.912A LEFT ANKLE INJURY, INITIAL ENCOUNTER: ICD-10-CM

## 2017-08-20 DIAGNOSIS — G89.29 CHRONIC PAIN OF LEFT KNEE: ICD-10-CM

## 2017-08-21 RX ORDER — GABAPENTIN 300 MG/1
CAPSULE ORAL
Qty: 90 CAPSULE | Refills: 0 | Status: SHIPPED | OUTPATIENT
Start: 2017-08-21 | End: 2017-10-25 | Stop reason: DRUGHIGH

## 2017-08-21 NOTE — TELEPHONE ENCOUNTER
Controlled Substance Refill Request for  gabapentin (NEURONTIN) 300 MG capsule 90 capsule 0 5/22/2017  No   Sig: TAKE 1 CAPSULE AT BEDTIME   Class: E-Prescribe   Order: [id number]   E-Prescribing Status: Receipt confirmed by pharmacy (5/22/2017 12:17 PM CDT)         Problem List Complete:  No     PROVIDER TO CONSIDER COMPLETION OF PROBLEM LIST AND OVERVIEW/CONTROLLED SUBSTANCE AGREEMENT      Last Office Visit with [facility] primary care provider: 5/22/2017    Future Office visit:     Controlled substance agreement on file: No.     Processing:  Fax Rx to see above  pharmacy     checked in past 6 months?  No, route to RN     Naila Mar RN, BSN  [facility]

## 2017-09-15 DIAGNOSIS — J45.21 INTERMITTENT ASTHMA, WITH ACUTE EXACERBATION: ICD-10-CM

## 2017-09-15 DIAGNOSIS — J45.30 MILD PERSISTENT ASTHMA WITHOUT COMPLICATION: ICD-10-CM

## 2017-09-15 RX ORDER — ALBUTEROL SULFATE 90 UG/1
AEROSOL, METERED RESPIRATORY (INHALATION)
Qty: 25.5 G | Refills: 1 | Status: SHIPPED | OUTPATIENT
Start: 2017-09-15 | End: 2017-10-05

## 2017-09-15 NOTE — TELEPHONE ENCOUNTER
Proair Inhaler       Last Written Prescription Date: 01/11/2017  Last Fill Quantity: 1, # refills: 2    Last Office Visit with [facility], [facility] or [facility] prescribing provider:  07/20/2017   Future Office Visit:       Date of Last Asthma Action Plan Letter:   Asthma Action Plan Q1 Year    Topic Date Due     Asthma Action Plan - yearly  04/25/2017      Asthma Control Test:   ACT Total Scores 7/20/2017   ACT TOTAL SCORE -   ASTHMA ER VISITS -   ASTHMA HOSPITALIZATIONS -   ACT TOTAL SCORE (Goal Greater than or Equal to 20) 18   In the past 12 months, how many times did you visit the emergency room for your asthma without being admitted to the hospital? 0   In the past 12 months, how many times were you hospitalized overnight because of your asthma? 0       Date of Last Spirometry Test:   No results found for this or any previous visit.

## 2017-09-15 NOTE — TELEPHONE ENCOUNTER
Prescription approved per [facility] Refill Protocol.  Cherelle Boland RN  [facility]

## 2017-10-05 ENCOUNTER — TELEPHONE (OUTPATIENT)
Dept: FAMILY MEDICINE | Facility: CLINIC | Age: 62
End: 2017-10-05

## 2017-10-05 ENCOUNTER — OFFICE VISIT (OUTPATIENT)
Dept: FAMILY MEDICINE | Facility: CLINIC | Age: 62
End: 2017-10-05
Payer: COMMERCIAL

## 2017-10-05 VITALS
WEIGHT: 209 LBS | DIASTOLIC BLOOD PRESSURE: 72 MMHG | BODY MASS INDEX: 33.73 KG/M2 | TEMPERATURE: 98.3 F | SYSTOLIC BLOOD PRESSURE: 122 MMHG | OXYGEN SATURATION: 96 % | HEART RATE: 97 BPM

## 2017-10-05 DIAGNOSIS — H93.3X1: ICD-10-CM

## 2017-10-05 DIAGNOSIS — J45.21 INTERMITTENT ASTHMA, WITH ACUTE EXACERBATION: ICD-10-CM

## 2017-10-05 DIAGNOSIS — J45.21 MILD INTERMITTENT ASTHMA WITH ACUTE EXACERBATION: Primary | ICD-10-CM

## 2017-10-05 DIAGNOSIS — H90.5 HEARING LOSS, SENSORINEURAL, HIGH FREQUENCY, RIGHT: ICD-10-CM

## 2017-10-05 DIAGNOSIS — D32.9 MENINGIOMA (H): ICD-10-CM

## 2017-10-05 PROCEDURE — 99214 OFFICE O/P EST MOD 30 MIN: CPT | Performed by: FAMILY MEDICINE

## 2017-10-05 RX ORDER — ALBUTEROL SULFATE 90 UG/1
AEROSOL, METERED RESPIRATORY (INHALATION)
Qty: 25.5 G | Refills: 11 | Status: SHIPPED | OUTPATIENT
Start: 2017-10-05 | End: 2018-12-28

## 2017-10-05 RX ORDER — FLUTICASONE PROPIONATE AND SALMETEROL 113; 14 UG/1; UG/1
1 POWDER, METERED RESPIRATORY (INHALATION) 2 TIMES DAILY
Qty: 1 EACH | Refills: 11 | Status: SHIPPED | OUTPATIENT
Start: 2017-10-05 | End: 2018-03-22

## 2017-10-05 RX ORDER — ALBUTEROL SULFATE 0.83 MG/ML
1 SOLUTION RESPIRATORY (INHALATION) EVERY 6 HOURS PRN
Qty: 25 VIAL | Refills: 11 | Status: SHIPPED | OUTPATIENT
Start: 2017-10-05 | End: 2019-01-17

## 2017-10-05 RX ORDER — PREDNISONE 20 MG/1
TABLET ORAL
Qty: 20 TABLET | Refills: 0 | Status: SHIPPED | OUTPATIENT
Start: 2017-10-05 | End: 2018-03-22

## 2017-10-05 NOTE — LETTER
My Asthma Action Plan  Name: Tasneem Chan   YOB: 1955  Date: 10/11/2017   My doctor: Zayra Villanueva MD   My clinic: [facility]        My Control Medicine: Fluticasone + salmeterol (Advair) -  Diskus 250/50 mcg 1 inhalation twice daily   My Rescue Medicine: Albuterol nebulizer solution 1 neb every 3-4 hours or   Albuterol (Proair/Ventolin/Proventil) inhaler 2 puffs every 4 hours   My Oral Steroid Medicine: prednisone - see current rx  My Asthma Severity: intermittent  Avoid your asthma triggers: smoke, upper respiratory infections, dust mites, pollens, animal dander, insects/rodents, mold, humidity, aspirin, strong odors and fumes, occupational exposure, exercise or sports, emotions, cold air and Gastric Reflux               GREEN ZONE   Good Control    I feel good    No cough or wheeze    Can work, sleep and play without asthma symptoms       Take your asthma control medicine every day.     1. If exercise triggers your asthma, take your rescue medication    15 minutes before exercise or sports, and    During exercise if you have asthma symptoms  2. Spacer to use with inhaler: If you have a spacer, make sure to use it with your inhaler             YELLOW ZONE Getting Worse  I have ANY of these:    I do not feel good    Cough or wheeze    Chest feels tight    Wake up at night   1. Keep taking your Green Zone medications  2. Start taking your rescue medicine:    every 20 minutes for up to 1 hour. Then every 4 hours for 24-48 hours.  3. If you stay in the Yellow Zone for more than 12-24 hours, contact your doctor.  4. If you do not return to the Green Zone in 12-24 hours or you get worse, start taking your oral steroid medicine if prescribed by your provider.           RED ZONE Medical Alert - Get Help  I have ANY of these:    I feel awful    Medicine is not helping    Breathing getting harder    Trouble walking or talking    Nose opens wide to breathe       1. Take your  rescue medicine NOW  2. If your provider has prescribed an oral steroid medicine, start taking it NOW  3. Call your doctor NOW  4. If you are still in the Red Zone after 20 minutes and you have not reached your doctor:    Take your rescue medicine again and    Call 911 or go to the emergency room right away    See your regular doctor within 2 weeks of an Emergency Room or Urgent Care visit for follow-up treatment.        Electronically signed by: Zayra Villanueva, October 11, 2017    Annual Reminders:  Meet with Asthma Educator,  Flu Shot in the Fall, consider Pneumonia Vaccination for patients with asthma (aged 19 and older).    Pharmacy:    [place] [id number] [place] [facility] [place] [address] [address]  [employer] [facility] [place], [place] - [address] [place] [address]  [facility] - USE FOR MAIL[facility] ONLY [place], [place]  [facility] - [place], [place] - [address]                    Asthma Triggers  How To Control Things That Make Your Asthma Worse    Triggers are things that make your asthma worse.  Look at the list below to help you find your triggers and what you can do about them.  You can help prevent asthma flare-ups by staying away from your triggers.      Trigger                                                          What you can do   Cigarette Smoke  Tobacco smoke can make asthma worse. Do not allow smoking in your home, car or around you.  Be sure no one smokes at a child s day care or school.  If you smoke, ask your health care provider for ways to help you quit.  Ask family members to quit too.  Ask your health care provider for a referral to Quit Plan to help you quit smoking, or call 3-214-092-PLAN.     Colds, Flu, Bronchitis  These are common triggers of asthma. Wash your hands often.  Don t touch your eyes, nose or mouth.  Get a flu shot every year.     Dust Mites  These are tiny bugs that live in cloth or carpet. They are  too small to see. Wash sheets and blankets in hot water every week.   Encase pillows and mattress in dust mite proof covers.  Avoid having carpet if you can. If you have carpet, vacuum weekly.   Use a dust mask and HEPA vacuum.   Pollen and Outdoor Mold  Some people are allergic to trees, grass, or weed pollen, or molds. Try to keep your windows closed.  Limit time out doors when pollen count is high.   Ask you health care provider about taking medicine during allergy season.     Animal Dander  Some people are allergic to skin flakes, urine or saliva from pets with fur or feathers. Keep pets with fur or feathers out of your home.    If you can t keep the pet outdoors, then keep the pet out of your bedroom.  Keep the bedroom door closed.  Keep pets off cloth furniture and away from stuffed toys.     Mice, Rats, and Cockroaches  Some people are allergic to the waste from these pests.   Cover food and garbage.  Clean up spills and food crumbs.  Store grease in the refrigerator.   Keep food out of the bedroom.   Indoor Mold  This can be a trigger if your home has high moisture. Fix leaking faucets, pipes, or other sources of water.   Clean moldy surfaces.  Dehumidify basement if it is damp and smelly.   Smoke, Strong Odors, and Sprays  These can reduce air quality. Stay away from strong odors and sprays, such as perfume, powder, hair spray, paints, smoke incense, paint, cleaning products, candles and new carpet.   Exercise or Sports  Some people with asthma have this trigger. Be active!  Ask your doctor about taking medicine before sports or exercise to prevent symptoms.    Warm up for 5-10 minutes before and after sports or exercise.     Other Triggers of Asthma  Cold air:  Cover your nose and mouth with a scarf.  Sometimes laughing or crying can be a trigger.  Some medicines and food can trigger asthma.

## 2017-10-05 NOTE — MR AVS SNAPSHOT
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   Asthma is a lung condition that causes irritation and swelling (inflammation) of the lining of the airways in your lungs. The inflammation causes wheezing, coughing, and shortness of breath. Asthma is a chronic condition, which means you may have it the rest of your life.   You may start coughing or wheezing when you breathe in irritants or something you are allergic to. Cold air, chemicals, perfume, and smoke are examples of irritants. Examples of things you might be allergic to, called allergens, are dust, pollen, molds, and animal dander. A cold or the flu might also bring on an asthma attack.   Some people have coughing or wheezing only during or after physical activity. This is called exercise-induced asthma.   Asthma may be mild, moderate, or severe. An asthma attack may last a few minutes or for days. Attacks can happen anywhere and at any time. Severe asthma attacks can be fatal. It is very important to get prompt treatment for asthma attacks and to learn to manage your asthma so you can live a healthy, active life.   About 20 million Americans have asthma, and the number of people who have asthma is increasing worldwide.   How does it occur?   If you have asthma, the airways in your lungs are always somewhat inflamed, even when you do not have any symptoms. When your airways are exposed to irritants or  allergens, the airways become more swollen and make more mucus. The tiny muscles in the walls of the airways contract. These reactions cause the airway openings to become smaller, making it harder for air to move in and out. Wheezing is the sound of air moving through the narrowed air passages. The extra mucus in the airways causes coughing.   Some of the factors that may increase the risk of developing asthma are:   low birth weight   having one or more close family members who have asthma   exposure to secondhand smoke or a lot of environmental pollutants (for example, in a large city)   on-the-job exposure to chemicals, such as chemicals used in the manufacturing industry, farming, and hairdressing   obesity.   What are the symptoms?   Symptoms are:   wheezing (a high-pitched whistling sound when you breathe in or out)   coughing   shortness of breath, or feeling like you cannot get enough air   chest tightness   You may find that there are things you used to do that you can no longer do because of your trouble with breathing.   How is it diagnosed?   A single attack of wheezing does not mean you have asthma. Some infections and chemicals can cause wheezing that lasts for a short time and then does not happen again. Your healthcare provider will ask about your history of breathing problems. You will have a physical exam. You may have one or more breathing tests. You may be tested before and after taking medicine to see how your symptoms respond to medicine.   How is it treated?   The goal of treatment is to allow you to live a normal, active life. Proper treatment can reduce your day-to-day asthma problems as well as the chance that you will have a bad asthma attack or more problems in the future. Treatment will probably include prescribed medicines and the removal of obvious allergy-causing substances or irritants from your home.   Three types of medicines are used to control asthma:   quick-relief medicines    steroid medicines   long-term-control medicines.   Quick-relief medicines (also called reliever, rescue, or quick-acting medicines)  Albuterol is the generic name of the most widely used quick-relief medicine. It is a type of medicine called a bronchodilator. Bronchodilators relax the muscles in the airways. When the muscles are relaxed, the airways become larger, so there is more space for air to move in and out. You inhale the medicine by breathing it into your lungs as you spray it into your mouth. You use this medicine when you start to have an asthma attack. In some cases your provider may recommend that you use it on a regular schedule.   You should always carry a bronchodilator with you to use when you begin to wheeze. If you have exercise-induced asthma, you should use the medicine before exercise to prevent wheezing.   Steroid medicines for prolonged, severe attacks  Steroids are another type of medicine that may be used to control asthma symptoms. They are a type of anti-inflammatory medicine that is often used for treatment of a bad attack that has not responded to other treatment. The medicine is usually prednisone and you are usually given between 4 and 10 days of the steroid tablets to take with your other medicines to get your asthma back under control.   Long-term control-medicines (also called controller medicines)  In addition to using a quick-relief bronchodilator when you have asthma attacks, you may need to combine different types of long-term control medicines for the best control of your wheezing. Several types of medicines help prevent asthma attacks. These medicines are now considered the best and safest way to have long-term control of asthma. They help reduce the inflammation in your airways. They are taken on a regular schedule whether or not you are having symptoms.   Long-term-control medicine should be used all the time for year-round asthma. For seasonal asthma, your healthcare  provider may instruct you to take a long-term-control medicine just during the months when you usually have asthma symptoms. The medicine does not work well if you start and stop it frequently, for example, every week or two.   Long-term-control medicines cannot stop attacks of wheezing after you have started wheezing. You must use a quick-relief medicine, such as albuterol, when you are wheezing.   The goals of controller medicines are to:   prevent asthma attacks   prevent chronic asthma symptoms, such as shortness of breath   let you have a fully active life, including participation in sports and other physical activities.   The medicines used most often for long-term control of asthma are:   a long-acting, inhaled bronchodilator, such as salmeterol (Serevent) used 2 times a day   inhaled steroids, such as Azmacort and Flovent, used 1 to 4 times a day   a type of medicine called leukotriene modifiers, including zafirlukast (Accolate), zileuton (Zyflo), or montelukast (Singulair) pills taken daily.   Some of these medicines are available as combinations.   Other, less often used controller medicines include:   theophylline, a pill often taken at bedtime to prevent nighttime wheezing   cromolyn or nedocromil, inhaled 3 to 4 times a day.   If you have severe persistent asthma, your healthcare provider may prescribe low-dose oral steroids for long-term control of the asthma. This medicine may be taken as tablets or a syrup.   You need to work closely with your healthcare provider to find the treatment right for you. Make sure you understand how to use each of your medicines. Some are quick-acting and meant to be used when you have an asthma attack. Others are slow acting and help prevent attacks but they do not help when you are having an attack.   Inhalers  Make sure you know how to use your inhaler correctly. Some inhalers work best if you hold them 2 inches in front of your mouth when you spray. This helps the  medicine get to your lungs rather than getting stuck in your mouth. However, for some inhalers you need to put your mouth on the inhaler.   If you have an inhaler that should be used a couple of inches in front of your mouth and it is hard for you to hold the inhaler in the right position, ask your healthcare provider for a spacer tube. You can put one end of the spacer in your mouth and attach the inhaler to the other end. This allows you to breathe in slowly and fully and to inhale more of the asthma medicine.   Be sure you ask your healthcare provider or pharmacist how your inhalers are supposed to be used. Read the directions that come with the inhalers. Also ask your pharmacist how you can know when your inhaler is empty so you can avoid getting caught without medicine.   Peak flow meter  Your breathing ability can change from day to day. For example, illness or seasonal allergies may make your airways more inflamed than usual. Your healthcare provider may prescribe a peak flow meter. You can use the peak flow meter to measure how well you are breathing. It can help you and your healthcare provider know when you might need to increase your dosage of medicine to prevent severe attacks of wheezing.   How long will the effects last?   Asthma is a chronic condition, even though you might not have any symptoms for decades. Asthma is more common in children than adults. People who had asthma as children often have no symptoms once they become adults, but the symptoms may come back later in life. Asthma that develops for the first time in mid- or late life usually continues to be a problem for the rest of your life.   How can I take care of myself?   Learn about asthma and its treatment.   Learn to recognize signs and symptoms of an asthma attack. Many people with asthma either don't recognize their asthma symptoms or underestimate the severity of their symptoms. Pay attention to your symptoms and, if your healthcare  provider recommends it, check your breathing with a peak flow meter.   Work with your healthcare provider to develop a written asthma action plan. Following the plan will help you manage your asthma every day. It will help you recognize and handle asthma problems.   Take your medicines exactly as prescribed. Always have your rescue medicine on hand and available. If you are taking a long-term-controller medicine, be sure to take it every day, just as your provider tells you. This prevents asthma attacks. If you are having wheezing even though you are using your controller medicines, let your healthcare provider know. Don't just stop them. If you are using both quick-relief and long-term control inhalers at the same time, use the quick relief inhaler first. Then wait several minutes before using the control inhaler.   Learn what can trigger your symptoms and how to stay away from them. For example, you may need to cover your mattress, box springs, and pillows with zippered plastic covers. It may help to stay indoors when the humidity or pollen count is high. Avoid cigarette smoke.   You should also:   See your healthcare provider for regular checkups as often as recommended.   Ask your provider if it is OK for you to take aspirin. Some people with asthma are allergic to aspirin and it causes them to wheeze. Aspirin is more likely to cause problems than other anti-inflammatory medicines, such as ibuprofen or naproxen, but sometimes they can cause wheezing, too. Acetaminophen (Tylenol) does not cause wheezing.   Get a flu vaccine every October.   If you often have problems with acid indigestion--a condition called gastroesophageal reflux disease, or GERD--your asthma symptoms may increase, especially at night. When you have GERD, stomach acid flows backward into the throat, irritating the upper airway and causing heartburn. If you have heartburn often, talk to your healthcare provider about it. Your provider may  prescribe a medicine that reduces the acid in your stomach to help relieve GERD and asthma symptoms. You can also prevent or relieve symptoms of GERD by:   losing weight if you are overweight.   sleeping with your head elevated at least 4 inches.   Asthma can be a life-threatening condition. If your medicines do not seem to be working to keep you breathing comfortably, contact your healthcare provider. If you are having an asthma attack and using your albuterol inhaler has not relieved your symptoms, you must get medical care right away. This may mean going to the emergency room or calling 911.                              Follow-ups after your visit        Your next 10 appointments already scheduled     Oct 16, 2017 10:15 AM CDT   New Visit with Kavya Belle DPM, Podiatry/Foot and Ankle Surgery   [facility] ([facility])    [address] [address] [phone]              Future tests that were ordered for you today     Open Future Orders        Priority Expected Expires Ordered    MR Brain w/o & w Contrast Routine  10/5/2018 10/5/2017            Who to contact     If you have questions or need follow up information about today's clinic visit or your schedule please contact [facility] directly at [phone].  Normal or non-critical lab and imaging results will be communicated to you by MyChart, letter or phone within 4 business days after the clinic has received the results. If you do not hear from us within 7 days, please contact the clinic through [employer] or phone. If you have a critical or abnormal lab result, we will notify you by phone as soon as possible.  Submit refill requests through [employer] or call your pharmacy and they will forward the refill request to us. Please allow 3 business days for your refill to be completed.          Additional Information About Your Visit        [employer] Information     [employer] gives you secure access to your  electronic health record. If you see a primary care provider, you can also send messages to your care team and make appointments. If you have questions, please call your primary care clinic.  If you do not have a primary care provider, please call [phone] and they will assist you.        Care EveryWhere ID     This is your Care EveryWhere ID. This could be used by other organizations to access your [place] medical records  [id number]        Your Vitals Were     Pulse Temperature Pulse Oximetry BMI (Body Mass Index)          97 98.3  F (36.8  C) (Oral) 96% 33.73 kg/m2         Blood Pressure from Last 3 Encounters:   10/05/17 122/72   07/20/17 120/78   05/08/17 130/82    Weight from Last 3 Encounters:   10/05/17 209 lb (94.8 kg)   07/20/17 206 lb (93.4 kg)   05/08/17 206 lb 8 oz (93.7 kg)                 Today's Medication Changes          These changes are accurate as of: 10/5/17 11:10 AM.  If you have any questions, ask your nurse or doctor.               Start taking these medicines.        Dose/Directions    fluticasone-salmeterol 250-50 MCG/DOSE diskus inhaler   Commonly known as:  ADVAIR DISKUS   Used for:  Mild intermittent asthma with acute exacerbation   Started by:  Zayra Villanueva MD        Dose:  1 puff   Inhale 1 puff into the lungs every 12 hours During asthma exacerbations   Quantity:  1 Inhaler   Refills:  11         These medicines have changed or have updated prescriptions.        Dose/Directions    * albuterol (2.5 MG/3ML) 0.083% neb solution   This may have changed:  Another medication with the same name was added. Make sure you understand how and when to take each.   Used for:  Acute non-recurrent maxillary sinusitis   Changed by:  Jennifer Mcduffie PA-C        Dose:  1 vial   Take 1 vial (2.5 mg) by nebulization every 4 hours as needed for shortness of breath / dyspnea or wheezing   Quantity:  60 vial   Refills:  3       * PROAIR  (90 BASE) MCG/ACT Inhaler   This may have  changed:  Another medication with the same name was added. Make sure you understand how and when to take each.   Used for:  Mild persistent asthma without complication, Intermittent asthma, with acute exacerbation   Generic drug:  albuterol   Changed by:  Zayra Villanueva MD        USE 1 TO 2 INHALATIONS EVERY 4 TO 6 HOURS AS NEEDED   Quantity:  25.5 g   Refills:  1       * albuterol (2.5 MG/3ML) 0.083% neb solution   This may have changed:  You were already taking a medication with the same name, and this prescription was added. Make sure you understand how and when to take each.   Used for:  Mild intermittent asthma with acute exacerbation   Changed by:  Zayra Villanueva MD        Dose:  1 vial   Take 1 vial (2.5 mg) by nebulization every 6 hours as needed for shortness of breath / dyspnea or wheezing   Quantity:  25 vial   Refills:  11       * predniSONE 20 MG tablet   Commonly known as:  DELTASONE   This may have changed:    - when to take this  - reasons to take this  - additional instructions   Used for:  Acute midline thoracic back pain   Changed by:  Keisha Duong PA-C        Take 60 mg daily for 3 days, then 40 mg daily for 3 days, then 20 mg daily for 3 days, then 10 mg for 4 days   Quantity:  20 tablet   Refills:  0       * predniSONE 20 MG tablet   Commonly known as:  DELTASONE   This may have changed:  You were already taking a medication with the same name, and this prescription was added. Make sure you understand how and when to take each.   Used for:  Mild intermittent asthma with acute exacerbation   Changed by:  Zayra Villanueva MD        Take 3 tabs (60 mg) by mouth daily x 3 days, 2 tabs (40 mg) daily x 3 days, 1 tab (20 mg) daily x 3 days, then 1/2 tab (10 mg) x 3 days.   Quantity:  20 tablet   Refills:  0       SUMAtriptan 100 MG tablet   Commonly known as:  IMITREX   This may have changed:  See the new instructions.   Used for:  Migraine with aura        TAKE AS  INSTRUCTED BY YOUR PRESCRIBER   Quantity:  27 tablet   Refills:  1       * Notice:  This list has 5 medication(s) that are the same as other medications prescribed for you. Read the directions carefully, and ask your doctor or other care provider to review them with you.         Where to get your medicines      These medications were sent to [facility] [place] - [place], [place] - [address]  [address], [facility] [address]     Phone:  [phone]     albuterol (2.5 MG/3ML) 0.083% neb solution    predniSONE 20 MG tablet         Some of these will need a paper prescription and others can be bought over the counter.  Ask your nurse if you have questions.     Bring a paper prescription for each of these medications     fluticasone-salmeterol 250-50 MCG/DOSE diskus inhaler                Primary Care Provider Office Phone # Fax #    Zayra Villanueva [phone]       [address] [place] [address]        Equal Access to Services     ODILON ROSAS AH: Hadii padmini ku hadasho Soomaali, waaxda luqadaha, qaybta kaalmada adeegyada, waxay elizabeth neves . So [facility] [phone].    ATENCIÓN: Si habla español, tiene a curtis disposición servicios gratuitos de asistencia lingüística. [facility] [phone].    We comply with applicable federal civil rights laws and [place] laws. We do not discriminate on the basis of race, color, national origin, age, disability, sex, sexual orientation, or gender identity.            Thank you!     Thank you for choosing [facility]  for your care. Our goal is always to provide you with excellent care. Hearing back from our patients is one way we can continue to improve our services. Please take a few minutes to complete the written survey that you may receive in the mail after your visit with us. Thank you!             Your Updated Medication List - Protect others around you: Learn how to safely  use, store and throw away your medicines at www.disposemymeds.org.          This list is accurate as of: 10/5/17 11:10 AM.  Always use your most recent med list.                   Brand Name Dispense Instructions for use Diagnosis    * albuterol (2.5 MG/3ML) 0.083% neb solution     60 vial    Take 1 vial (2.5 mg) by nebulization every 4 hours as needed for shortness of breath / dyspnea or wheezing    Acute non-recurrent maxillary sinusitis       * PROAIR  (90 BASE) MCG/ACT Inhaler   Generic drug:  albuterol     25.5 g    USE 1 TO 2 INHALATIONS EVERY 4 TO 6 HOURS AS NEEDED    Mild persistent asthma without complication, Intermittent asthma, with acute exacerbation       * albuterol (2.5 MG/3ML) 0.083% neb solution     25 vial    Take 1 vial (2.5 mg) by nebulization every 6 hours as needed for shortness of breath / dyspnea or wheezing    Mild intermittent asthma with acute exacerbation       cetirizine 10 MG tablet    ZYRTEC    30 tablet    Take 1 tablet by mouth daily.    Rash       cyclobenzaprine 10 MG tablet    FLEXERIL    20 tablet    1/2 tab during the day and 1 tab at bedtime as needed for muscle spasms    Acute midline thoracic back pain       fluticasone 50 MCG/ACT spray    FLONASE    16 g    USE 1 TO 2 SPRAYS IN EACH NOSTRIL DAILY    Allergic rhinitis       fluticasone-salmeterol 250-50 MCG/DOSE diskus inhaler    ADVAIR DISKUS    1 Inhaler    Inhale 1 puff into the lungs every 12 hours During asthma exacerbations    Mild intermittent asthma with acute exacerbation       gabapentin 300 MG capsule    NEURONTIN    90 capsule    TAKE 1 CAPSULE AT BEDTIME    Left ankle injury, initial encounter, Chronic pain of left knee       montelukast 10 MG tablet    SINGULAIR    90 tablet    TAKE 1 TABLET DAILY    Mild persistent asthma without complication, Intermittent asthma, with acute exacerbation       * order for DME     1 Units    cpap machine and needed tubing and accessories    SUSHIL (obstructive sleep apnea)        * order for DME     1 Units    Nebulizer machine Qty #1    Asthma exacerbation, moderate persistent       * predniSONE 20 MG tablet    DELTASONE    20 tablet    Take 60 mg daily for 3 days, then 40 mg daily for 3 days, then 20 mg daily for 3 days, then 10 mg for 4 days    Acute midline thoracic back pain       * predniSONE 20 MG tablet    DELTASONE    20 tablet    Take 3 tabs (60 mg) by mouth daily x 3 days, 2 tabs (40 mg) daily x 3 days, 1 tab (20 mg) daily x 3 days, then 1/2 tab (10 mg) x 3 days.    Mild intermittent asthma with acute exacerbation       SUMAtriptan 100 MG tablet    IMITREX    27 tablet    TAKE AS INSTRUCTED BY YOUR PRESCRIBER    Migraine with aura       verapamil 240 MG CR tablet    CALAN-SR    90 tablet    TAKE 1 TABLET AT BEDTIME    Migraine with aura       * Notice:  This list has 7 medication(s) that are the same as other medications prescribed for you. Read the directions carefully, and ask your doctor or other care provider to review them with you.

## 2017-10-05 NOTE — NURSING NOTE
"Chief Complaint   Patient presents with     Respiratory Problems       Initial /72 (BP Location: Left arm, Patient Position: Chair, Cuff Size: Adult Large)  Pulse 97  Temp 98.3  F (36.8  C) (Oral)  Wt 209 lb (94.8 kg)  SpO2 96%  BMI 33.73 kg/m2 Estimated body mass index is 33.73 kg/(m^2) as calculated from the following:    Height as of 7/20/17: 5' 6\" (1.676 m).    Weight as of this encounter: 209 lb (94.8 kg).  Medication Reconciliation: complete   Anne Khalil CMA  "

## 2017-10-05 NOTE — TELEPHONE ENCOUNTER
RESPIRATORY SYMPTOMS      Duration: Cough x 2 weeks, SOB and wheezing    Description  cough and wheezing    Severity: moderate    Accompanying signs and symptoms: None    History (predisposing factors):  asthma    Precipitating or alleviating factors: None    Therapies tried and outcome:  Albuterol inhaler and Singulair and Zyrtec      Cough is getting worse over the past two weeks.  Wheezing is waking her up.  She also is using CPAP machine.    RN advised office visit today for evaluation and treatment.  Scheduled with PCP at 1045 today.    BARBIE Chamberlain, RN, PHN  [facility]  Ph) [phone]

## 2017-10-05 NOTE — PATIENT INSTRUCTIONS
Ok to continue to use zyrtec daily for your allergies.   Asthma  What is asthma?   Asthma is a lung condition that causes irritation and swelling (inflammation) of the lining of the airways in your lungs. The inflammation causes wheezing, coughing, and shortness of breath. Asthma is a chronic condition, which means you may have it the rest of your life.   You may start coughing or wheezing when you breathe in irritants or something you are allergic to. Cold air, chemicals, perfume, and smoke are examples of irritants. Examples of things you might be allergic to, called allergens, are dust, pollen, molds, and animal dander. A cold or the flu might also bring on an asthma attack.   Some people have coughing or wheezing only during or after physical activity. This is called exercise-induced asthma.   Asthma may be mild, moderate, or severe. An asthma attack may last a few minutes or for days. Attacks can happen anywhere and at any time. Severe asthma attacks can be fatal. It is very important to get prompt treatment for asthma attacks and to learn to manage your asthma so you can live a healthy, active life.   About 20 million Americans have asthma, and the number of people who have asthma is increasing worldwide.   How does it occur?   If you have asthma, the airways in your lungs are always somewhat inflamed, even when you do not have any symptoms. When your airways are exposed to irritants or allergens, the airways become more swollen and make more mucus. The tiny muscles in the walls of the airways contract. These reactions cause the airway openings to become smaller, making it harder for air to move in and out. Wheezing is the sound of air moving through the narrowed air passages. The extra mucus in the airways causes coughing.   Some of the factors that may increase the risk of developing asthma are:   low birth weight   having one or more close family members who have asthma   exposure to  secondhand smoke or a lot of environmental pollutants (for example, in a large city)   on-the-job exposure to chemicals, such as chemicals used in the manufacturing industry, farming, and hairdressing   obesity.   What are the symptoms?   Symptoms are:   wheezing (a high-pitched whistling sound when you breathe in or out)   coughing   shortness of breath, or feeling like you cannot get enough air   chest tightness   You may find that there are things you used to do that you can no longer do because of your trouble with breathing.   How is it diagnosed?   A single attack of wheezing does not mean you have asthma. Some infections and chemicals can cause wheezing that lasts for a short time and then does not happen again. Your healthcare provider will ask about your history of breathing problems. You will have a physical exam. You may have one or more breathing tests. You may be tested before and after taking medicine to see how your symptoms respond to medicine.   How is it treated?   The goal of treatment is to allow you to live a normal, active life. Proper treatment can reduce your day-to-day asthma problems as well as the chance that you will have a bad asthma attack or more problems in the future. Treatment will probably include prescribed medicines and the removal of obvious allergy-causing substances or irritants from your home.   Three types of medicines are used to control asthma:   quick-relief medicines   steroid medicines   long-term-control medicines.   Quick-relief medicines (also called reliever, rescue, or quick-acting medicines)  Albuterol is the generic name of the most widely used quick-relief medicine. It is a type of medicine called a bronchodilator. Bronchodilators relax the muscles in the airways. When the muscles are relaxed, the airways become larger, so there is more space for air to move in and out. You inhale the medicine by breathing it into your lungs as you spray it into your mouth. You  use this medicine when you start to have an asthma attack. In some cases your provider may recommend that you use it on a regular schedule.   You should always carry a bronchodilator with you to use when you begin to wheeze. If you have exercise-induced asthma, you should use the medicine before exercise to prevent wheezing.   Steroid medicines for prolonged, severe attacks  Steroids are another type of medicine that may be used to control asthma symptoms. They are a type of anti-inflammatory medicine that is often used for treatment of a bad attack that has not responded to other treatment. The medicine is usually prednisone and you are usually given between 4 and 10 days of the steroid tablets to take with your other medicines to get your asthma back under control.   Long-term control-medicines (also called controller medicines)  In addition to using a quick-relief bronchodilator when you have asthma attacks, you may need to combine different types of long-term control medicines for the best control of your wheezing. Several types of medicines help prevent asthma attacks. These medicines are now considered the best and safest way to have long-term control of asthma. They help reduce the inflammation in your airways. They are taken on a regular schedule whether or not you are having symptoms.   Long-term-control medicine should be used all the time for year-round asthma. For seasonal asthma, your healthcare provider may instruct you to take a long-term-control medicine just during the months when you usually have asthma symptoms. The medicine does not work well if you start and stop it frequently, for example, every week or two.   Long-term-control medicines cannot stop attacks of wheezing after you have started wheezing. You must use a quick-relief medicine, such as albuterol, when you are wheezing.   The goals of controller medicines are to:   prevent asthma attacks   prevent chronic asthma symptoms, such as  shortness of breath   let you have a fully active life, including participation in sports and other physical activities.   The medicines used most often for long-term control of asthma are:   a long-acting, inhaled bronchodilator, such as salmeterol (Serevent) used 2 times a day   inhaled steroids, such as Azmacort and Flovent, used 1 to 4 times a day   a type of medicine called leukotriene modifiers, including zafirlukast (Accolate), zileuton (Zyflo), or montelukast (Singulair) pills taken daily.   Some of these medicines are available as combinations.   Other, less often used controller medicines include:   theophylline, a pill often taken at bedtime to prevent nighttime wheezing   cromolyn or nedocromil, inhaled 3 to 4 times a day.   If you have severe persistent asthma, your healthcare provider may prescribe low-dose oral steroids for long-term control of the asthma. This medicine may be taken as tablets or a syrup.   You need to work closely with your healthcare provider to find the treatment right for you. Make sure you understand how to use each of your medicines. Some are quick-acting and meant to be used when you have an asthma attack. Others are slow acting and help prevent attacks but they do not help when you are having an attack.   Inhalers  Make sure you know how to use your inhaler correctly. Some inhalers work best if you hold them 2 inches in front of your mouth when you spray. This helps the medicine get to your lungs rather than getting stuck in your mouth. However, for some inhalers you need to put your mouth on the inhaler.   If you have an inhaler that should be used a couple of inches in front of your mouth and it is hard for you to hold the inhaler in the right position, ask your healthcare provider for a spacer tube. You can put one end of the spacer in your mouth and attach the inhaler to the other end. This allows you to breathe in slowly and fully and to inhale more of the asthma  medicine.   Be sure you ask your healthcare provider or pharmacist how your inhalers are supposed to be used. Read the directions that come with the inhalers. Also ask your pharmacist how you can know when your inhaler is empty so you can avoid getting caught without medicine.   Peak flow meter  Your breathing ability can change from day to day. For example, illness or seasonal allergies may make your airways more inflamed than usual. Your healthcare provider may prescribe a peak flow meter. You can use the peak flow meter to measure how well you are breathing. It can help you and your healthcare provider know when you might need to increase your dosage of medicine to prevent severe attacks of wheezing.   How long will the effects last?   Asthma is a chronic condition, even though you might not have any symptoms for decades. Asthma is more common in children than adults. People who had asthma as children often have no symptoms once they become adults, but the symptoms may come back later in life. Asthma that develops for the first time in mid- or late life usually continues to be a problem for the rest of your life.   How can I take care of myself?   Learn about asthma and its treatment.   Learn to recognize signs and symptoms of an asthma attack. Many people with asthma either don't recognize their asthma symptoms or underestimate the severity of their symptoms. Pay attention to your symptoms and, if your healthcare provider recommends it, check your breathing with a peak flow meter.   Work with your healthcare provider to develop a written asthma action plan. Following the plan will help you manage your asthma every day. It will help you recognize and handle asthma problems.   Take your medicines exactly as prescribed. Always have your rescue medicine on hand and available. If you are taking a long-term-controller medicine, be sure to take it every day, just as your provider tells you. This prevents asthma  attacks. If you are having wheezing even though you are using your controller medicines, let your healthcare provider know. Don't just stop them. If you are using both quick-relief and long-term control inhalers at the same time, use the quick relief inhaler first. Then wait several minutes before using the control inhaler.   Learn what can trigger your symptoms and how to stay away from them. For example, you may need to cover your mattress, box springs, and pillows with zippered plastic covers. It may help to stay indoors when the humidity or pollen count is high. Avoid cigarette smoke.   You should also:   See your healthcare provider for regular checkups as often as recommended.   Ask your provider if it is OK for you to take aspirin. Some people with asthma are allergic to aspirin and it causes them to wheeze. Aspirin is more likely to cause problems than other anti-inflammatory medicines, such as ibuprofen or naproxen, but sometimes they can cause wheezing, too. Acetaminophen (Tylenol) does not cause wheezing.   Get a flu vaccine every October.   If you often have problems with acid indigestion--a condition called gastroesophageal reflux disease, or GERD--your asthma symptoms may increase, especially at night. When you have GERD, stomach acid flows backward into the throat, irritating the upper airway and causing heartburn. If you have heartburn often, talk to your healthcare provider about it. Your provider may prescribe a medicine that reduces the acid in your stomach to help relieve GERD and asthma symptoms. You can also prevent or relieve symptoms of GERD by:   losing weight if you are overweight.   sleeping with your head elevated at least 4 inches.   Asthma can be a life-threatening condition. If your medicines do not seem to be working to keep you breathing comfortably, contact your healthcare provider. If you are having an asthma attack and using your albuterol inhaler has not relieved your symptoms,  you must get medical care right away. This may mean going to the emergency room or calling 911.

## 2017-10-05 NOTE — PROGRESS NOTES
SUBJECTIVE:                                                    Tasneem Chan is a 62 year old female who presents to clinic today for the following health issues:    RESPIRATORY SYMPTOMS       Duration: Cough x 2 weeks, SOB and wheezing    Description  cough and wheezing    Severity: moderate    Accompanying signs and symptoms: None    History (predisposing factors):  asthma    Precipitating or alleviating factors: None    Therapies tried and outcome:  Albuterol inhaler and Singulair and Zyrtec        Cough is getting worse over the past two weeks.  Wheezing is waking her up.  She also is using CPAP machine. Also using albuterol every 3 hours regularly. Doesn't have any current steroid inhaler or oral steroids at home.     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 20 tablet 0     fluticasone-salmeterol (ADVAIR DISKUS) 250-50 MCG/DOSE diskus inhaler Inhale 1 puff into the lungs every 12 hours During asthma exacerbations 1 Inhaler 11     albuterol (2.5 MG/3ML) 0.083% neb solution Take 1 vial (2.5 mg) by nebulization every 6 hours as needed for shortness of breath / dyspnea or wheezing 25 vial 11     albuterol (PROAIR HFA) 108 (90 BASE) MCG/ACT Inhaler USE 1 TO 2 INHALATIONS EVERY 4 TO 6 HOURS AS NEEDED 25.5 g 11      Fluticasone-Salmeterol 113-14 MCG/ACT AEPB Inhale 1 puff into the lungs 2 times daily 1 each 11     gabapentin (NEURONTIN) 300 MG capsule TAKE 1 CAPSULE AT BEDTIME 90 capsule 0     cyclobenzaprine (FLEXERIL) 10 MG tablet 1/2 tab during the day and 1 tab at bedtime as needed for muscle spasms 20 tablet 0     predniSONE (DELTASONE) 20 MG tablet Take 60 mg daily for 3 days, then 40 mg daily for 3 days, then 20 mg daily for 3 days, then 10 mg for 4 days (Patient taking differently: as needed Take 60 mg daily for 3 days, then 40 mg daily for 3 days, then 20 mg daily for 3 days, then 10 mg for 4 days) 20 tablet 0     montelukast (SINGULAIR) 10 MG tablet TAKE 1 TABLET DAILY 90 tablet 3     verapamil (CALAN-SR) 240 MG CR tablet TAKE 1 TABLET AT BEDTIME 90 tablet 3     albuterol (2.5 MG/3ML) 0.083% neb solution Take 1 vial (2.5 mg) by nebulization every 4 hours as needed for shortness of breath / dyspnea or wheezing 60 vial 3     SUMAtriptan (IMITREX) 100 MG tablet TAKE AS INSTRUCTED BY YOUR PRESCRIBER (Patient taking differently: TAKE AS INSTRUCTED BY YOUR PRESCRIBER - AS NEEDED) 27 tablet 1     fluticasone (FLONASE) 50 MCG/ACT spray USE 1 TO 2 SPRAYS IN EACH NOSTRIL DAILY 16 g 1     order for DME Nebulizer machine Qty #1 1 Units 1     ORDER FOR DME cpap machine and needed tubing and accessories 1 Units 1     cetirizine (ZYRTEC) 10 MG tablet Take 1 tablet by mouth daily. 30 tablet 11          Allergies   Allergen Reactions     Levaquin [Levofloxacin Hemihydrate]      diarrhea              Problem list and histories reviewed & adjusted, as indicated.  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    ROS: 12 point ROS neg other than the symptoms noted above.     OBJECTIVE:                                                    /72 (BP Location: Left arm, Patient Position: Chair, Cuff Size: Adult Large)  Pulse 97  Temp 98.3  F (36.8  C) (Oral)  Wt 209 lb (94.8 kg)  SpO2 96%  BMI 33.73 kg/m2  Body mass index is 33.73 kg/(m^2).   GENERAL: healthy, alert, well nourished, well hydrated, no distress  HENT: ear canals- normal; TMs- normal; Nose- normal; Mouth- no ulcers, no lesions  NECK: no tenderness, no adenopathy, no asymmetry, no masses, no stiffness; thyroid- normal to palpation  RESP: lungs clear to auscultation - no rales, no rhonchi, no wheezes  CV: regular rates and rhythm, normal S1 S2, no S3 or S4 and no murmur, no click or rub.   ABDOMEN: soft, no tenderness, no  hepatosplenomegaly, no masses, normal bowel sounds  MS: extremities- no gross deformities noted, no edema    Diagnostic test results:  See [facility] orders.        ASSESSMENT/PLAN:                                                        ICD-10-CM    1. Mild intermittent asthma with acute exacerbation J45.21 predniSONE (DELTASONE) 20 MG tablet     fluticasone-salmeterol (ADVAIR DISKUS) 250-50 MCG/DOSE diskus inhaler     albuterol (2.5 MG/3ML) 0.083% neb solution     albuterol (PROAIR HFA) 108 (90 BASE) MCG/ACT Inhaler     Fluticasone-Salmeterol 113-14 MCG/ACT AEPB   2. 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 Meningioma (H) D32.9 MR Brain w/o & w Contrast   4. 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 Intermittent asthma, with acute exacerbation J45.21         Please, call or return to clinic or go to the ER immediately if signs or symptoms worsen or fail to improve as anticipated in the next 24 hours.     See Patient Instructions.           Zayra Villanueva  MD    [facility]

## 2017-10-12 ENCOUNTER — HOSPITAL ENCOUNTER (OUTPATIENT)
Dept: MRI IMAGING | Facility: CLINIC | Age: 62
Discharge: HOME | End: 2017-10-12
Attending: FAMILY MEDICINE | Admitting: FAMILY MEDICINE
Payer: COMMERCIAL

## 2017-10-12 DIAGNOSIS — D32.9 MENINGIOMA (H): ICD-10-CM

## 2017-10-12 DIAGNOSIS — H90.5 HEARING LOSS, SENSORINEURAL, HIGH FREQUENCY, RIGHT: ICD-10-CM

## 2017-10-12 DIAGNOSIS — H93.3X1: ICD-10-CM

## 2017-10-12 PROCEDURE — 70553 MRI BRAIN STEM W/O & W/DYE: CPT

## 2017-10-12 PROCEDURE — A9585 GADOBUTROL INJECTION: HCPCS | Performed by: RADIOLOGY

## 2017-10-12 PROCEDURE — 25000128 H RX IP 250 OP 636: Performed by: RADIOLOGY

## 2017-10-12 RX ORDER — GADOBUTROL 604.72 MG/ML
10 INJECTION INTRAVENOUS ONCE
Status: COMPLETED | OUTPATIENT
Start: 2017-10-12 | End: 2017-10-12

## 2017-10-12 RX ADMIN — GADOBUTROL 9 ML: 604.72 INJECTION INTRAVENOUS at 14:45

## 2017-10-16 ENCOUNTER — OFFICE VISIT (OUTPATIENT)
Dept: PODIATRY | Facility: CLINIC | Age: 62
End: 2017-10-16
Payer: COMMERCIAL

## 2017-10-16 VITALS
HEIGHT: 66 IN | DIASTOLIC BLOOD PRESSURE: 76 MMHG | SYSTOLIC BLOOD PRESSURE: 118 MMHG | WEIGHT: 209 LBS | BODY MASS INDEX: 33.59 KG/M2

## 2017-10-16 DIAGNOSIS — M21.42 PES PLANUS OF BOTH FEET: ICD-10-CM

## 2017-10-16 DIAGNOSIS — I87.2 VENOUS INSUFFICIENCY OF BOTH LOWER EXTREMITIES: ICD-10-CM

## 2017-10-16 DIAGNOSIS — M79.672 BILATERAL FOOT PAIN: Primary | ICD-10-CM

## 2017-10-16 DIAGNOSIS — M79.671 BILATERAL FOOT PAIN: Primary | ICD-10-CM

## 2017-10-16 DIAGNOSIS — M76.821 POSTERIOR TIBIAL TENDONITIS, RIGHT: ICD-10-CM

## 2017-10-16 DIAGNOSIS — M76.822 POSTERIOR TIBIAL TENDON DYSFUNCTION, LEFT: ICD-10-CM

## 2017-10-16 DIAGNOSIS — M21.41 PES PLANUS OF BOTH FEET: ICD-10-CM

## 2017-10-16 PROCEDURE — 99203 OFFICE O/P NEW LOW 30 MIN: CPT | Performed by: PODIATRIST

## 2017-10-16 RX ORDER — DEXAMETHASONE SODIUM PHOSPHATE 4 MG/ML
4 INJECTION, SOLUTION INTRA-ARTICULAR; INTRALESIONAL; INTRAMUSCULAR; INTRAVENOUS; SOFT TISSUE ONCE
Qty: 30 ML | Refills: 0 | Status: SHIPPED | OUTPATIENT
Start: 2017-10-16 | End: 2018-03-22

## 2017-10-16 NOTE — PATIENT INSTRUCTIONS
DR. AL'S CLINIC LOCATIONS:   MONDAY AM - [place] TUESDAY - [place]   [address] [id number] [address] [address] [place] [address]   [phone] / [phone] / [phone]       [facility]    [address] [address]    [phone] / [phone]        FRIDAY PM - [place] SCHEDULE SURGERY: [phone]   [id number] [address] APPOINTMENTS: [phone]   [place] [address] BILLING QUESTIONS: [phone] / [phone]      FLAT FEET     Flatfoot is often a complex disorder, with diverse symptoms and varying degrees of deformity and disability. There are several types of flatfoot, all of which have one characteristic in common: partial or total collapse (loss) of the arch.  Other characteristics shared by most types of flatfoot include:   Toe drift,  in which the toes and front part of the foot point outward   The heel tilts toward the outside and the ankle appears to turn in   A tight Achilles tendon, which causes the heel to lift off the ground earlier when walking and may make the problem worse   Bunions and hammertoes may develop as a result of a flatfoot.   Flexible Flatfoot  Flexible flatfoot is one of the most common types of flatfoot. It typically begins in childhood or adolescence and continues into adulthood. It usually occurs in both feet and progresses in severity throughout the adult years. As the deformity worsens, the soft tissues (tendons and ligaments) of the arch may stretch or tear and can become inflamed.  The term  flexible  means that while the foot is flat when standing (weight-bearing), the arch returns when not standing.  Symptoms  Pain in the heel, arch, ankle, or along the outside of the foot    Rolled-in  ankle (over-pronation)   Pain along the shin bone (shin splint)   General aching or fatigue in the foot or leg   Low back, hip or knee pain.   Diagnosis  In diagnosing flatfoot, the  foot and ankle surgeon examines the foot and observes how it looks when you stand and sit. X-rays are usually taken to determine the severity of the disorder. If you are diagnosed with flexible flatfoot but you don t have any symptoms, your surgeon will explain what you might expect in the future.  Non-surgical Treatment  If you experience symptoms with flexible flatfoot, the surgeon may recommend non-surgical treatment options, including:  Activity modifications. Cut down on activities that bring you pain and avoid prolonged walking and standing to give your arches a rest.   Weight loss. If you are overweight, try to lose weight. Putting too much weight on your arches may aggravate your symptoms.   Orthotic devices. Your foot and ankle surgeon can provide you with custom orthotic devices for your shoes to give more support to the arches.   Immobilization. In some cases, it may be necessary to use a walking cast or to completely avoid weight-bearing.   Medications. Nonsteroidal anti-inflammatory drugs (NSAIDs), such as ibuprofen, help reduce pain and inflammation.   Physical therapy. Ultrasound therapy or other physical therapy modalities may be used to provide temporary relief.   Shoe modifications. Wearing shoes that support the arches is important for anyone who has flatfoot.   When is Surgery Necessary?  In some patients whose pain is not adequately relieved by other treatments, surgery may be considered. A variety of surgical techniques is available to correct flexible flatfoot, and one or a combination of procedures may be required to relieve the symptoms and improve foot function.  In selecting the procedure or combination of procedures for your particular case, the foot and ankle surgeon will take into consideration the extent of your deformity based on the x-ray findings, your age, your activity level, and other factors. The length of the recovery period will vary, depending on the procedure or procedures  performed.  TENDONITIS   Tendons are the strong fibrous portions ofmuscles that attach to bones and allow the muscle to move a joint when it contracts. Tendons are very strong because they have a lot of force exerted on them. Sometimes tendons can become painful because they have suffered an acute injury, in which too much force was exerted at one time, or an overuse injury, in which a normal force was exerted too frequently or over a prolonged period of time. As a result, there is damage to the tendon and its surrounding soft tissue structures and they become inflammed. Because tendons do not have a great blood supply, they do not heal rapidly and the inflammation can become chronic.   Conservative treatment for tendinitis involves rest and anti-inflammatory measures. Ice is applied 15 minutes 2-3 times daily. Anti-inflammatory medications called NSAIDs (ibuprofen, example) can be taken provided they are used with caution, as they can lead to internal bleeding and increase the risk ofstroke and heart attack. Sometimes topical nitroglycerin is prescribed to help with pain. Often your doctor will use a special shoe or removable walking cast to immobilize the tendon, allowing it to heal without further damage from use. These devices are very useful in helping tendons heal, but they may slow you down or make you feel like your hip, knee, or back are out ofalignment. This is temporary and should go away once you are out ofthe immobilization. You should not use a walking cast when showering or driving. Another option is Platelet Rich Plasma injections. (Normally done with a Sports and Orthorapedic doctor.   If conservative measures fail, your physician may need to surgically repair the tendon by removing any chronic inflammatory tissue and sewing it back together. Sometimes it is sewn to an adjacent tendon with similar function for support and sometimes it is lengthened. . Sometimes the bones around the tendon need to be  realigned or reshaped to better support the tendon or prevent further damage. Your foot and ankle surgeon will discuss the specifics of your surgery with you, should you need it.    Towel stretch: Sit on a hard surface with your injured leg stretched out in front of you. Loop a towel around your toes and the ball of your foot and pull the towel toward your body keeping your leg straight. Hold this position for 15 to 30 seconds and then relax. Repeat 3 times. Then push the towel away with the ball of your foot. Repeat 3 times.  When you don't feel much of a stretch using the towel, you can start the standing calf stretch and the following exercises.  Standing calf stretch: Stand facing a wall with your hands on the wall at about eye level. Keep your injured leg back with your heel on the floor. Keep the other leg forward with the knee bent. Turn your back foot slightly inward (as if you were pigeon-toed). Slowly lean into the wall until you feel a stretch in the back of your calf. Hold the stretch for 15 to 30 seconds. Return to the starting position. Repeat 3 times. Do this exercise several times each day.   Standing soleus stretch: Stand facing a wall with your hands on the wall at about chest height. Keep your injured leg back with your heel on the floor. Keep the other leg forward with the knee bent. Turn your back foot slightly inward (as if you were pigeon-toed). Bend your back knee slightly and gently lean into the wall until you feel a stretch in the lower calf of your injured leg. Hold the stretch for 15 to 30 seconds. Return to the starting position. Repeat 3 times.   Achilles stretch: Stand with the ball of one foot on a stair. Reach for the step below with your heel until you feel a stretch in the arch of your foot. Hold this position for 15 to 30 seconds and then relax. Repeat 3 times.   Heel raise: Balance yourself while standing behind a chair or counter. Using the chair or counter as a support to help  you, raise your body up onto your toes and hold for 5 seconds. Then slowly lower yourself down without holding onto the support. (It's OK to keep holding onto the support if you need to.) When this exercise becomes less painful, try lowering yourself down on the injured leg only. Repeat 15 times. Do 2 sets of 15. Rest 30 seconds between sets.   Step-up: Stand with the foot of your injured leg on a support 3 to 5 inches high (like a small step or block of wood). Keep your other foot flat on the floor. Shift your weight onto the injured leg on the support. Straighten your injured leg as the other leg comes off the floor. Return to the starting position by bending your injured leg and slowly lowering your uninjured leg back to the floor. Do 2 sets of 15.   Resisted ankle eversion: Sit with both legs stretched out in front of you, with your feet about a shoulder's width apart. Tie a loop in one end of elastic tubing. Put the foot of your injured leg through the loop so that the tubing goes around the arch of that foot and wraps around the outside of the other foot. Hold onto the other end of the tubing with your hand to provide tension. Turn the foot of your injured leg up and out. Make sure you keep your other foot still so that it will allow the tubing to stretch as you move the foot of your injured leg. Return to the starting position. Do 2 sets of 15.   Balance and reach exercises: Stand next to a chair with your injured leg farther from the chair. The chair will provide support if you need it. Stand on the foot of your injured leg and bend your knee slightly. Try to raise the arch of this foot while keeping your big toe on the floor. Keep your foot in this position. With the hand that is farther away from the chair, reach forward in front of you by bending at the waist. Avoid bending your knee any more as you do this. Repeat this 10 times. To make the exercise more challenging, reach farther in front of you. Do 2  sets of 10.  the same position as above. While keeping your arch height, reach the hand that is farther away from the chair across your body toward the chair. The farther you reach, the more challenging the exercise. Do 2 sets of 10.     Resisted ankle eversion: Sit with both legs stretched out in front of you, with your feet about a shoulder's width apart. Tie a loop in one end of elastic tubing. Put the foot of your injured leg through the loop so that the tubing goes around the arch of that foot and wraps around the outside of the other foot. Hold onto the other end of the tubing with your hand to provide tension. Turn the foot of your injured leg up and out. Make sure you keep your other foot still so that it will allow the tubing to stretch as you move the foot of your injured leg. Return to the starting position. Do 2 sets of 15.   If you have access to a wobble board, do the following exercises:  Wobble board exercises:   Stand on a wobble board with your feet shoulder width apart. Rock the board forwards and backwards 30 times, then side to side 30 times. Hold on to a chair if you need support.   Rotate the wobble board around so that the edge of the board is in contact with the floor at all times. Do this 30 times in a clockwise and then a counterclockwise direction.   Balance on the wobble board for as long as you can without letting the edges touch the floor. Try to do this for 2 minutes without touching the floor.   Rotate the wobble board in clockwise and counterclockwise circles, but do not let the edge of the board touch the floor.   When you have mastered exercises A through D, try repeating them while standing on just your injured leg.   After you are able to do these exercises on one leg, try to do them with your eyes closed. Make sure you have something nearby to support you in case you lose your balance.      Body Mass Index (BMI)  Many things can cause foot and ankle problems. Foot  structure, activity level, foot mechanics and injuries are common causes of pain.  One very important issue that often goes unmentioned, is body weight. Extra weight can cause increased stress on muscles, ligaments, bones and tendons.  Sometimes just a few extra pounds is all it takes to put one over her/his threshold. Without reducing that stress, it can be difficult to alleviate pain. Some people are uncomfortable addressing this issue, but we feel it is important for you to think about it. As Foot &  Ankle specialists, our job is addressing the lower extremity problem and possible causes. Regarding extra body weight, we encourage patients to discuss diet and weight management plans with their primary care doctors. It is this team approach that gives you the best opportunity for pain relief and getting you back on your feet.

## 2017-10-16 NOTE — PROGRESS NOTES
PATIENT HISTORY:  Tasneem Chan is a 62 year old female who presents to clinic for pain to both feet. Left is worse than right. She notes that she fell about a year ago. Has been in a boot. Helped a little. Worse when on feet. Better with rest. Pain is 6/10. Is wondering what is wrong with feet and what can be done for it.     Review of Systems:  Patient denies fever, chills, rash, wound, numbness, weakness, heart burn, blood in stool, chest pain with activity, calf pain when walking, shortness of breath with activity, chronic cough, easy bleeding/bruising, excessive thirst, fatigue, depression, anxiety.  Patient admits to limping, stiffness, swelling.     PAST MEDICAL HISTORY:   Past Medical History:   Diagnosis Date     Irritable bowel syndrome      Malignant neoplasm of breast (female), unspecified site     radiation, tamoxifen, lumpectomy - rt breast     Migraine with aura, without mention of intractable migraine without mention of status migrainosus      Mild persistent asthma     mild - humidity and infection triggers     Personal history of DVT (deep vein thrombosis)     x2 - never had previous testing        PAST SURGICAL HISTORY:   Past Surgical History:   Procedure Laterality Date     BREAST LUMPECTOMY, RT/LT      RT      SECTION       SURGICAL HISTORY OF -       Rt Knee spur removal        MEDICATIONS:   Current Outpatient Prescriptions:      predniSONE (DELTASONE) 20 MG tablet, Take 3 tabs (60 mg) by mouth daily x 3 days, 2 tabs (40 mg) daily x 3 days, 1 tab (20 mg) daily x 3 days, then 1/2 tab (10 mg) x 3 days., Disp: 20 tablet, Rfl: 0     fluticasone-salmeterol (ADVAIR DISKUS) 250-50 MCG/DOSE diskus inhaler, Inhale 1 puff into the lungs every 12 hours During asthma exacerbations, Disp: 1 Inhaler, Rfl: 11     albuterol (2.5 MG/3ML) 0.083% neb solution, Take 1 vial (2.5 mg) by nebulization every 6 hours as needed for shortness of breath / dyspnea or wheezing, Disp:  25 vial, Rfl: 11     albuterol (PROAIR HFA) 108 (90 BASE) MCG/ACT Inhaler, USE 1 TO 2 INHALATIONS EVERY 4 TO 6 HOURS AS NEEDED, Disp: 25.5 g, Rfl: 11     Fluticasone-Salmeterol 113-14 MCG/ACT AEPB, Inhale 1 puff into the lungs 2 times daily, Disp: 1 each, Rfl: 11     gabapentin (NEURONTIN) 300 MG capsule, TAKE 1 CAPSULE AT BEDTIME, Disp: 90 capsule, Rfl: 0     cyclobenzaprine (FLEXERIL) 10 MG tablet, 1/2 tab during the day and 1 tab at bedtime as needed for muscle spasms, Disp: 20 tablet, Rfl: 0     predniSONE (DELTASONE) 20 MG tablet, Take 60 mg daily for 3 days, then 40 mg daily for 3 days, then 20 mg daily for 3 days, then 10 mg for 4 days (Patient taking differently: as needed Take 60 mg daily for 3 days, then 40 mg daily for 3 days, then 20 mg daily for 3 days, then 10 mg for 4 days), Disp: 20 tablet, Rfl: 0     montelukast (SINGULAIR) 10 MG tablet, TAKE 1 TABLET DAILY, Disp: 90 tablet, Rfl: 3     verapamil (CALAN-SR) 240 MG CR tablet, TAKE 1 TABLET AT BEDTIME, Disp: 90 tablet, Rfl: 3     albuterol (2.5 MG/3ML) 0.083% neb solution, Take 1 vial (2.5 mg) by nebulization every 4 hours as needed for shortness of breath / dyspnea or wheezing, Disp: 60 vial, Rfl: 3     SUMAtriptan (IMITREX) 100 MG tablet, TAKE AS INSTRUCTED BY YOUR PRESCRIBER (Patient taking differently: TAKE AS INSTRUCTED BY YOUR PRESCRIBER - AS NEEDED), Disp: 27 tablet, Rfl: 1     fluticasone (FLONASE) 50 MCG/ACT spray, USE 1 TO 2 SPRAYS IN EACH NOSTRIL DAILY, Disp: 16 g, Rfl: 1     order for DME, Nebulizer machine Qty #1, Disp: 1 Units, Rfl: 1     ORDER FOR DME, cpap machine and needed tubing and accessories, Disp: 1 Units, Rfl: 1     cetirizine (ZYRTEC) 10 MG tablet, Take 1 tablet by mouth daily., Disp: 30 tablet, Rfl: 11     ALLERGIES:    Allergies   Allergen Reactions     Levaquin [Levofloxacin Hemihydrate]      diarrhea        SOCIAL HISTORY:   Social History     Social History     Marital status:      Spouse name: eder Carver  "children: 3     Years of education: 14     Occupational History      None      Social History Main Topics     Smoking status: Never Smoker     Smokeless tobacco: Never Used     Alcohol use No     Drug use: No     Sexual activity: Yes     Partners: Male      Comment: postmenopausal      Other Topics Concern     Caffeine Concern Yes     2 cups daily     Exercise Yes     walks     Seat Belt Yes     Parent/Sibling W/ Cabg, Mi Or Angioplasty Before 65f 55m? Yes     Mother and sister     Social History Narrative        FAMILY HISTORY:   Family History   Problem Relation Age of Onset     Cardiovascular Mother      rheumatic fever, irregular rhythm     Pulmonary Embolism Father 61     2 weeks after hernia surgery     HEART DISEASE Sister      heart block        EXAM:Vitals:/76  Ht 1.676 m (5' 6\")  Wt 94.8 kg (209 lb)  BMI 33.73 kg/m2    General appearance: Patient is alert and fully cooperative with history & exam.  No sign of distress is noted during the visit.     Psychiatric: Affect is pleasant & appropriate.  Patient appears motivated to improve health.     Respiratory: Breathing is regular & unlabored while sitting.     HEENT: Hearing is intact to spoken word.  Speech is clear.  No gross evidence of visual impairment that would impact ambulation.     Dermatologic: Skin is intact to both lower extremities without significant lesions, rash or abrasion.  No paronychia or evidence of soft tissue infection is noted.     Vascular: DP & PT pulses are intact & regular bilaterally.  No significant edema or varicosities noted.  CFT and skin temperature is normal to both lower extremities.     Neurologic: Lower extremity sensation is intact to light touch.  No evidence of weakness or contracture in the lower extremities.  No evidence of neuropathy.     Musculoskeletal: Patient is ambulatory without assistive device or brace.  Decrease arch height. Pain with inversion and eversion. Pain on palpation of the posterior " tibial tendon left worse than right. Unable to do single heel rise test.      ASSESSMENT:    Bilateral foot pain  Posterior tibial tendon dysfunction, left  Pes planus of both feet  Posterior tibial tendonitis, right  Venous insufficiency of both lower extremities     PLAN:  Reviewed patient's chart in [facility]. Reviewed and discussed causes of tendonitis.  We discussed treatments such as immobiliation, icing, stretching, heel lifts, orthotics, physical therapy, MRI.     We discussed causes and treatments of flat feet.  Overtime, flat feet or falling arches can become painful and possible cause tension to the posterior tibial tendon leading to tendonitis or possible tear/rupture.  Conservatively we treat these with arch supports, shoe gear, physical therapy, immobilization and sometimes, surgically such as multiple fusions in the foot to help stabilize the foot. Surgery is quite involved and requires 6-10 weeks non weight bearing followed by 1 month of protected weight bearing.     At this celsa, recommend ankle brace. Recommend PT and orthotics. If pain continues, recommend MRI to assess for tendon tear.       Kavya Belle DPM, Podiatry/Foot and Ankle Surgery    Weight management plan: Patient was referred to their PCP to discuss a diet and exercise plan.

## 2017-10-16 NOTE — NURSING NOTE
"Chief Complaint   Patient presents with     Ankle Pain     bilateral ankle pain from the ankle to the great toe on the left side more but right also bothers her        Initial /76  Ht 5' 6\" (1.676 m)  Wt 209 lb (94.8 kg)  BMI 33.73 kg/m2 Estimated body mass index is 33.73 kg/(m^2) as calculated from the following:    Height as of this encounter: 5' 6\" (1.676 m).    Weight as of this encounter: 209 lb (94.8 kg).  Medication Reconciliation: complete   Henry Hart MA      "

## 2017-10-16 NOTE — LETTER
10/16/2017         RE: Tasneem Chan  [id number]  [facility] [address]        Dear Colleague,    Thank you [facility] referring your patient, [facility] Chan[facility]. Please see a copy of my visit note below.    PATIENT HISTORY:  Tasneem Chan is a 62 year old female who presents to clinic for pain to both feet. Left is worse than right. She notes that she fell about a year ago. Has been in a boot. Helped a little. Worse when on feet. Better with rest. Pain is 6/10. Is wondering what is wrong with feet and what can be done for it.     Review of Systems:  Patient denies fever, chills, rash, wound, numbness, weakness, heart burn, blood in stool, chest pain with activity, calf pain when walking, shortness of breath with activity, chronic cough, easy bleeding/bruising, excessive thirst, fatigue, depression, anxiety.  Patient admits to limping, stiffness, swelling.     PAST MEDICAL HISTORY:   Past Medical History:   Diagnosis Date     Irritable bowel syndrome      Malignant neoplasm of breast (female), unspecified site     radiation, tamoxifen, lumpectomy - rt breast     Migraine with aura, without mention of intractable migraine without mention of status migrainosus      Mild persistent asthma     mild - humidity and infection triggers     Personal history of DVT (deep vein thrombosis)     x2 - never had previous testing        PAST SURGICAL HISTORY:   Past Surgical History:   Procedure Laterality Date     BREAST LUMPECTOMY, RT/LT      RT      SECTION       SURGICAL HISTORY OF -       Rt Knee spur removal        MEDICATIONS:   Current Outpatient Prescriptions:      predniSONE (DELTASONE) 20 MG tablet, Take 3 tabs (60 mg) by mouth daily x 3 days, 2 tabs (40 mg) daily x 3 days, 1 tab (20 mg) daily x 3 days, then 1/2 tab (10 mg) x 3 days., Disp: 20 tablet, Rfl: 0     fluticasone-salmeterol (ADVAIR DISKUS) 250-50 MCG/DOSE diskus inhaler, Inhale 1  puff into the lungs every 12 hours During asthma exacerbations, Disp: 1 Inhaler, Rfl: 11     albuterol (2.5 MG/3ML) 0.083% neb solution, Take 1 vial (2.5 mg) by nebulization every 6 hours as needed for shortness of breath / dyspnea or wheezing, Disp: 25 vial, Rfl: 11     albuterol (PROAIR HFA) 108 (90 BASE) MCG/ACT Inhaler, USE 1 TO 2 INHALATIONS EVERY 4 TO 6 HOURS AS NEEDED, Disp: 25.5 g, Rfl: 11     Fluticasone-Salmeterol 113-14 MCG/ACT AEPB, Inhale 1 puff into the lungs 2 times daily, Disp: 1 each, Rfl: 11     gabapentin (NEURONTIN) 300 MG capsule, TAKE 1 CAPSULE AT BEDTIME, Disp: 90 capsule, Rfl: 0     cyclobenzaprine (FLEXERIL) 10 MG tablet, 1/2 tab during the day and 1 tab at bedtime as needed for muscle spasms, Disp: 20 tablet, Rfl: 0     predniSONE (DELTASONE) 20 MG tablet, Take 60 mg daily for 3 days, then 40 mg daily for 3 days, then 20 mg daily for 3 days, then 10 mg for 4 days (Patient taking differently: as needed Take 60 mg daily for 3 days, then 40 mg daily for 3 days, then 20 mg daily for 3 days, then 10 mg for 4 days), Disp: 20 tablet, Rfl: 0     montelukast (SINGULAIR) 10 MG tablet, TAKE 1 TABLET DAILY, Disp: 90 tablet, Rfl: 3     verapamil (CALAN-SR) 240 MG CR tablet, TAKE 1 TABLET AT BEDTIME, Disp: 90 tablet, Rfl: 3     albuterol (2.5 MG/3ML) 0.083% neb solution, Take 1 vial (2.5 mg) by nebulization every 4 hours as needed for shortness of breath / dyspnea or wheezing, Disp: 60 vial, Rfl: 3     SUMAtriptan (IMITREX) 100 MG tablet, TAKE AS INSTRUCTED BY YOUR PRESCRIBER (Patient taking differently: TAKE AS INSTRUCTED BY YOUR PRESCRIBER - AS NEEDED), Disp: 27 tablet, Rfl: 1     fluticasone (FLONASE) 50 MCG/ACT spray, USE 1 TO 2 SPRAYS IN EACH NOSTRIL DAILY, Disp: 16 g, Rfl: 1     order for DME, Nebulizer machine Qty #1, Disp: 1 Units, Rfl: 1     ORDER FOR DME, cpap machine and needed tubing and accessories, Disp: 1 Units, Rfl: 1     cetirizine (ZYRTEC) 10 MG tablet, Take 1 tablet by mouth daily.,  "Disp: 30 tablet, Rfl: 11     ALLERGIES:    Allergies   Allergen Reactions     Levaquin [Levofloxacin Hemihydrate]      diarrhea        SOCIAL HISTORY:   Social History     Social History     Marital status:      Spouse name: eder     Number of children: 3     Years of education: 14     Occupational History      None      Social History Main Topics     Smoking status: Never Smoker     Smokeless tobacco: Never Used     Alcohol use No     Drug use: No     Sexual activity: Yes     Partners: Male      Comment: postmenopausal      Other Topics Concern     Caffeine Concern Yes     2 cups daily     Exercise Yes     walks     Seat Belt Yes     Parent/Sibling W/ Cabg, Mi Or Angioplasty Before 65f 55m? Yes     Mother and sister     Social History Narrative        FAMILY HISTORY:   Family History   Problem Relation Age of Onset     Cardiovascular Mother      rheumatic fever, irregular rhythm     Pulmonary Embolism Father 61     2 weeks after hernia surgery     HEART DISEASE Sister      heart block        EXAM:Vitals:/76  Ht 1.676 m (5' 6\")  Wt 94.8 kg (209 lb)  BMI 33.73 kg/m2    General appearance: Patient is alert and fully cooperative with history & exam.  No sign of distress is noted during the visit.     Psychiatric: Affect is pleasant & appropriate.  Patient appears motivated to improve health.     Respiratory: Breathing is regular & unlabored while sitting.     HEENT: Hearing is intact to spoken word.  Speech is clear.  No gross evidence of visual impairment that would impact ambulation.     Dermatologic: Skin is intact to both lower extremities without significant lesions, rash or abrasion.  No paronychia or evidence of soft tissue infection is noted.     Vascular: DP & PT pulses are intact & regular bilaterally.  No significant edema or varicosities noted.  CFT and skin temperature is normal to both lower extremities.     Neurologic: Lower extremity sensation is intact to light touch.  No evidence of " weakness or contracture in the lower extremities.  No evidence of neuropathy.     Musculoskeletal: Patient is ambulatory without assistive device or brace.  Decrease arch height. Pain with inversion and eversion. Pain on palpation of the posterior tibial tendon left worse than right. Unable to do single heel rise test.      ASSESSMENT:    Bilateral foot pain  Posterior tibial tendon dysfunction, left  Pes planus of both feet  Posterior tibial tendonitis, right  Venous insufficiency of both lower extremities     PLAN:  Reviewed patient's chart in [facility]. Reviewed and discussed causes of tendonitis.  We discussed treatments such as immobiliation, icing, stretching, heel lifts, orthotics, physical therapy, MRI.     We discussed causes and treatments of flat feet.  Overtime, flat feet or falling arches can become painful and possible cause tension to the posterior tibial tendon leading to tendonitis or possible tear/rupture.  Conservatively we treat these with arch supports, shoe gear, physical therapy, immobilization and sometimes, surgically such as multiple fusions in the foot to help stabilize the foot. Surgery is quite involved and requires 6-10 weeks non weight bearing followed by 1 month of protected weight bearing.     At this celsa, recommend ankle brace. Recommend PT and orthotics. If pain continues, recommend MRI to assess for tendon tear.       Kavya Belle DPM, Podiatry/Foot and Ankle Surgery    Weight management plan: Patient was referred to their PCP to discuss a diet and exercise plan.      Again, thank you for allowing me to participate in the care of your patient.        Sincerely,        Kavya Belle DPM, Podiatry/Foot and Ankle Surgery

## 2017-10-16 NOTE — MR AVS SNAPSHOT
After Visit Summary   10/16/2017    Tasneem Chan    MRN: [id number]           Patient Information     Date Of Birth          1955        Visit Information        Provider Department      10/16/2017 10:15 AM Kavya Belle DPM, Podiatry/Foot and Ankle Surgery [facility]        Today's Diagnoses     Bilateral foot pain    -  1    Posterior tibial tendon dysfunction, left        Pes planus of both feet        Posterior tibial tendonitis, right        Venous insufficiency of both lower extremities          Care Instructions      DR. BELLE'S CLINIC LOCATIONS:   MONDAY AM - [place] TUESDAY - [place]   [address] [address] [place] [address] [place] [address]   [phone] / [phone]       WEDNESDAY - [place]    [address] [place] [address]    [phone] / [phone]        FRIDAY PM - [place] SCHEDULE SURGERY: [phone]   [address] APPOINTMENTS: [phone]   [place] [address] BILLING QUESTIONS: [phone] / [phone]      FLAT FEET     Flatfoot is often a complex disorder, with diverse symptoms and varying degrees of deformity and disability. There are several types of flatfoot, all of which have one characteristic in common: partial or total collapse (loss) of the arch.  Other characteristics shared by most types of flatfoot include:   Toe drift,  in which the toes and front part of the foot point outward   The heel tilts toward the outside and the ankle appears to turn in   A tight Achilles tendon, which causes the heel to lift off the ground earlier when walking and may make the problem worse   Bunions and hammertoes may develop as a result of a flatfoot.   Flexible Flatfoot  Flexible flatfoot is one of the most common types of flatfoot. It typically begins in childhood or adolescence and continues into adulthood. It usually occurs in both feet and  progresses in severity throughout the adult years. As the deformity worsens, the soft tissues (tendons and ligaments) of the arch may stretch or tear and can become inflamed.  The term  flexible  means that while the foot is flat when standing (weight-bearing), the arch returns when not standing.  Symptoms  Pain in the heel, arch, ankle, or along the outside of the foot    Rolled-in  ankle (over-pronation)   Pain along the shin bone (shin splint)   General aching or fatigue in the foot or leg   Low back, hip or knee pain.   Diagnosis  In diagnosing flatfoot, the foot and ankle surgeon examines the foot and observes how it looks when you stand and sit. X-rays are usually taken to determine the severity of the disorder. If you are diagnosed with flexible flatfoot but you don t have any symptoms, your surgeon will explain what you might expect in the future.  Non-surgical Treatment  If you experience symptoms with flexible flatfoot, the surgeon may recommend non-surgical treatment options, including:  Activity modifications. Cut down on activities that bring you pain and avoid prolonged walking and standing to give your arches a rest.   Weight loss. If you are overweight, try to lose weight. Putting too much weight on your arches may aggravate your symptoms.   Orthotic devices. Your foot and ankle surgeon can provide you with custom orthotic devices for your shoes to give more support to the arches.   Immobilization. In some cases, it may be necessary to use a walking cast or to completely avoid weight-bearing.   Medications. Nonsteroidal anti-inflammatory drugs (NSAIDs), such as ibuprofen, help reduce pain and inflammation.   Physical therapy. Ultrasound therapy or other physical therapy modalities may be used to provide temporary relief.   Shoe modifications. Wearing shoes that support the arches is important for anyone who has flatfoot.   When is Surgery Necessary?  In some patients whose pain is not adequately  relieved by other treatments, surgery may be considered. A variety of surgical techniques is available to correct flexible flatfoot, and one or a combination of procedures may be required to relieve the symptoms and improve foot function.  In selecting the procedure or combination of procedures for your particular case, the foot and ankle surgeon will take into consideration the extent of your deformity based on the x-ray findings, your age, your activity level, and other factors. The length of the recovery period will vary, depending on the procedure or procedures performed.  TENDONITIS   Tendons are the strong fibrous portions ofmuscles that attach to bones and allow the muscle to move a joint when it contracts. Tendons are very strong because they have a lot of force exerted on them. Sometimes tendons can become painful because they have suffered an acute injury, in which too much force was exerted at one time, or an overuse injury, in which a normal force was exerted too frequently or over a prolonged period of time. As a result, there is damage to the tendon and its surrounding soft tissue structures and they become inflammed. Because tendons do not have a great blood supply, they do not heal rapidly and the inflammation can become chronic.   Conservative treatment for tendinitis involves rest and anti-inflammatory measures. Ice is applied 15 minutes 2-3 times daily. Anti-inflammatory medications called NSAIDs (ibuprofen, example) can be taken provided they are used with caution, as they can lead to internal bleeding and increase the risk ofstroke and heart attack. Sometimes topical nitroglycerin is prescribed to help with pain. Often your doctor will use a special shoe or removable walking cast to immobilize the tendon, allowing it to heal without further damage from use. These devices are very useful in helping tendons heal, but they may slow you down or make you feel like your hip, knee, or back are out  ofalignment. This is temporary and should go away once you are out ofthe immobilization. You should not use a walking cast when showering or driving. Another option is Platelet Rich Plasma injections. (Normally done with a Sports and Orthorapedic doctor.   If conservative measures fail, your physician may need to surgically repair the tendon by removing any chronic inflammatory tissue and sewing it back together. Sometimes it is sewn to an adjacent tendon with similar function for support and sometimes it is lengthened. . Sometimes the bones around the tendon need to be realigned or reshaped to better support the tendon or prevent further damage. Your foot and ankle surgeon will discuss the specifics of your surgery with you, should you need it.    Towel stretch: Sit on a hard surface with your injured leg stretched out in front of you. Loop a towel around your toes and the ball of your foot and pull the towel toward your body keeping your leg straight. Hold this position for 15 to 30 seconds and then relax. Repeat 3 times. Then push the towel away with the ball of your foot. Repeat 3 times.  When you don't feel much of a stretch using the towel, you can start the standing calf stretch and the following exercises.  Standing calf stretch: Stand facing a wall with your hands on the wall at about eye level. Keep your injured leg back with your heel on the floor. Keep the other leg forward with the knee bent. Turn your back foot slightly inward (as if you were pigeon-toed). Slowly lean into the wall until you feel a stretch in the back of your calf. Hold the stretch for 15 to 30 seconds. Return to the starting position. Repeat 3 times. Do this exercise several times each day.   Standing soleus stretch: Stand facing a wall with your hands on the wall at about chest height. Keep your injured leg back with your heel on the floor. Keep the other leg forward with the knee bent. Turn your back foot slightly inward (as if you  were pigeon-toed). Bend your back knee slightly and gently lean into the wall until you feel a stretch in the lower calf of your injured leg. Hold the stretch for 15 to 30 seconds. Return to the starting position. Repeat 3 times.   Achilles stretch: Stand with the ball of one foot on a stair. Reach for the step below with your heel until you feel a stretch in the arch of your foot. Hold this position for 15 to 30 seconds and then relax. Repeat 3 times.   Heel raise: Balance yourself while standing behind a chair or counter. Using the chair or counter as a support to help you, raise your body up onto your toes and hold for 5 seconds. Then slowly lower yourself down without holding onto the support. (It's OK to keep holding onto the support if you need to.) When this exercise becomes less painful, try lowering yourself down on the injured leg only. Repeat 15 times. Do 2 sets of 15. Rest 30 seconds between sets.   Step-up: Stand with the foot of your injured leg on a support 3 to 5 inches high (like a small step or block of wood). Keep your other foot flat on the floor. Shift your weight onto the injured leg on the support. Straighten your injured leg as the other leg comes off the floor. Return to the starting position by bending your injured leg and slowly lowering your uninjured leg back to the floor. Do 2 sets of 15.   Resisted ankle eversion: Sit with both legs stretched out in front of you, with your feet about a shoulder's width apart. Tie a loop in one end of elastic tubing. Put the foot of your injured leg through the loop so that the tubing goes around the arch of that foot and wraps around the outside of the other foot. Hold onto the other end of the tubing with your hand to provide tension. Turn the foot of your injured leg up and out. Make sure you keep your other foot still so that it will allow the tubing to stretch as you move the foot of your injured leg. Return to the starting position. Do 2 sets of  15.   Balance and reach exercises: Stand next to a chair with your injured leg farther from the chair. The chair will provide support if you need it. Stand on the foot of your injured leg and bend your knee slightly. Try to raise the arch of this foot while keeping your big toe on the floor. Keep your foot in this position. With the hand that is farther away from the chair, reach forward in front of you by bending at the waist. Avoid bending your knee any more as you do this. Repeat this 10 times. To make the exercise more challenging, reach farther in front of you. Do 2 sets of 10.  the same position as above. While keeping your arch height, reach the hand that is farther away from the chair across your body toward the chair. The farther you reach, the more challenging the exercise. Do 2 sets of 10.     Resisted ankle eversion: Sit with both legs stretched out in front of you, with your feet about a shoulder's width apart. Tie a loop in one end of elastic tubing. Put the foot of your injured leg through the loop so that the tubing goes around the arch of that foot and wraps around the outside of the other foot. Hold onto the other end of the tubing with your hand to provide tension. Turn the foot of your injured leg up and out. Make sure you keep your other foot still so that it will allow the tubing to stretch as you move the foot of your injured leg. Return to the starting position. Do 2 sets of 15.   If you have access to a wobble board, do the following exercises:  Wobble board exercises:   Stand on a wobble board with your feet shoulder width apart. Rock the board forwards and backwards 30 times, then side to side 30 times. Hold on to a chair if you need support.   Rotate the wobble board around so that the edge of the board is in contact with the floor at all times. Do this 30 times in a clockwise and then a counterclockwise direction.   Balance on the wobble board for as long as you can without  letting the edges touch the floor. Try to do this for 2 minutes without touching the floor.   Rotate the wobble board in clockwise and counterclockwise circles, but do not let the edge of the board touch the floor.   When you have mastered exercises A through D, try repeating them while standing on just your injured leg.   After you are able to do these exercises on one leg, try to do them with your eyes closed. Make sure you have something nearby to support you in case you lose your balance.      Body Mass Index (BMI)  Many things can cause foot and ankle problems. Foot structure, activity level, foot mechanics and injuries are common causes of pain.  One very important issue that often goes unmentioned, is body weight. Extra weight can cause increased stress on muscles, ligaments, bones and tendons.  Sometimes just a few extra pounds is all it takes to put one over her/his threshold. Without reducing that stress, it can be difficult to alleviate pain. Some people are uncomfortable addressing this issue, but we feel it is important for you to think about it. As Foot &  Ankle specialists, our job is addressing the lower extremity problem and possible causes. Regarding extra body weight, we encourage patients to discuss diet and weight management plans with their primary care doctors. It is this team approach that gives you the best opportunity for pain relief and getting you back on your feet.                  Follow-ups after your visit        Additional Services     [facility] PT, HAND, AND CHIROPRACTIC REFERRAL       **This order will print in the [facility] Scheduling Office**    Physical Therapy, Hand Therapy and Chiropractic Care are available through:    *[facility]  *[facility]  *[facility]    Call one number to schedule at any of the above locations: [phone].    Your provider has referred you to: Physical Therapy at [facility] or [facility]    Indication/Reason for  Referral: posterior tibial tendonitis bilateral, left worse  Onset of Illness: 1 year  Therapy Orders: Evaluate and Treat  Special Programs: None  Special Request: Exercise: Home Exercise Program, Posture/Body Mechanics and Stretching/Flexibility  Modalities: As Indicated: , Iontophoresis (Please Order: Dexamethasone Sodium Phosphate - 4mg/ml injectable, 30 cc total volume) and Ultrasound    Evelyn Rivas      Additional Comments for the Therapist or Chiropractor:    Please be aware that coverage of these services is subject to the terms and limitations of your health insurance plan.  Call member services at your health plan with any benefit or coverage questions.      Please bring the following to your appointment:    *Your personal calendar for scheduling future appointments  *Comfortable clothing            ORTHOTICS REFERRAL       Please be aware that coverage of these services is subject to the terms and limitations of your health insurance plan.  Call member services at your health plan with any benefit or coverage questions.      Please bring the following to your appointment:    >>   Any x-rays, CTs or MRIs which have been performed.  Contact the facility where they were done to arrange for  prior to your scheduled appointment.  Any new CT, MRI or other procedures ordered by your specialist must be performed at a [facility] facility or coordinated by your clinic's referral office.    >>   List of current medications   >>   This referral request   >>   Any documents/labs given to you for this referral    ==This Referral PRINTS in the [facility] (ORTHOTICS & PROSTHETICS) Central scheduling office ==     The [facility] Central Scheduling staff will contact patient to arrange appointments. Central Scheduling Phone #:  [place], [place]  [phone]     Foot orthotics with arch support                  Who to contact     If you have questions or need follow up information about today's  "clinic visit or your schedule please contact [facility] [place] directly at [phone].  Normal or non-critical lab and imaging results will be communicated to you by MyChart, letter or phone within 4 business days after the clinic has received the results. If you do not hear from us within 7 days, please contact the clinic through [employer] or phone. If you have a critical or abnormal lab result, we will notify you by phone as soon as possible.  Submit refill requests through [employer] or call your pharmacy and they will forward the refill request to us. Please allow 3 business days for your refill to be completed.          Additional Information About Your Visit        [employer] Information     [employer] gives you secure access to your electronic health record. If you see a primary care provider, you can also send messages to your care team and make appointments. If you have questions, please call your primary care clinic.  If you do not have a primary care provider, please call [phone] and they will assist you.        Care EveryWhere ID     This is your Care EveryWhere ID. This could be used by other organizations to access your [facility] medical records  [id number]        Your Vitals Were     Height BMI (Body Mass Index)                5' 6\" (1.676 m) 33.73 kg/m2           Blood Pressure from Last 3 Encounters:   10/16/17 118/76   10/05/17 122/72   07/20/17 120/78    Weight from Last 3 Encounters:   10/16/17 209 lb (94.8 kg)   10/05/17 209 lb (94.8 kg)   07/20/17 206 lb (93.4 kg)              We Performed the Following     [facility] PT, HAND, AND CHIROPRACTIC REFERRAL     ORTHOTICS REFERRAL          Today's Medication Changes          These changes are accurate as of: 10/16/17 10:37 AM.  If you have any questions, ask your nurse or doctor.               Start taking these medicines.        Dose/Directions    dexamethasone 4 MG/ML injection   Commonly known as:  DECADRON   Used for:  Bilateral foot pain, Posterior " tibial tendon dysfunction, left, Pes planus of both feet, Posterior tibial tendonitis, right, Venous insufficiency of both lower extremities   Started by:  Kavya Belle DPM, Podiatry/Foot and Ankle Surgery        Dose:  4 mg   Apply 1 mL (4 mg) topically once for 1 dose For physical therapy, iontophoresis   Quantity:  30 mL   Refills:  0         These medicines have changed or have updated prescriptions.        Dose/Directions    * order for DME   This may have changed:  Another medication with the same name was added. Make sure you understand how and when to take each.   Used for:  SUSHIL (obstructive sleep apnea)   Changed by:  Zayra Villanueva MD        cpap machine and needed tubing and accessories   Quantity:  1 Units   Refills:  1       * order for DME   This may have changed:  Another medication with the same name was added. Make sure you understand how and when to take each.   Used for:  Asthma exacerbation, moderate persistent   Changed by:  Zayra Villanueva MD        Nebulizer machine Qty #1   Quantity:  1 Units   Refills:  1       * order for DME   This may have changed:  You were already taking a medication with the same name, and this prescription was added. Make sure you understand how and when to take each.   Used for:  Bilateral foot pain, Posterior tibial tendon dysfunction, left, Pes planus of both feet, Posterior tibial tendonitis, right, Venous insufficiency of both lower extremities   Changed by:  Kavya Belle DPM, Podiatry/Foot and Ankle Surgery        Ankle brace   Quantity:  1 Device   Refills:  0       * predniSONE 20 MG tablet   Commonly known as:  DELTASONE   This may have changed:    - when to take this  - reasons to take this  - additional instructions   Used for:  Acute midline thoracic back pain   Changed by:  Keisha Duong PA-C        Take 60 mg daily for 3 days, then 40 mg daily for 3 days, then 20 mg daily for 3 days, then 10 mg for 4 days   Quantity:  20  tablet   Refills:  0       * predniSONE 20 MG tablet   Commonly known as:  DELTASONE   This may have changed:  Another medication with the same name was changed. Make sure you understand how and when to take each.   Used for:  Mild intermittent asthma with acute exacerbation   Changed by:  Zayra Villanueva MD        Take 3 tabs (60 mg) by mouth daily x 3 days, 2 tabs (40 mg) daily x 3 days, 1 tab (20 mg) daily x 3 days, then 1/2 tab (10 mg) x 3 days.   Quantity:  20 tablet   Refills:  0       SUMAtriptan 100 MG tablet   Commonly known as:  IMITREX   This may have changed:  See the new instructions.   Used for:  Migraine with aura        TAKE AS INSTRUCTED BY YOUR PRESCRIBER   Quantity:  27 tablet   Refills:  1       * Notice:  This list has 5 medication(s) that are the same as other medications prescribed for you. Read the directions carefully, and ask your doctor or other care provider to review them with you.         Where to get your medicines      These medications were sent to [facility] [place] [facility] [place] [address]     Phone:  [phone]     dexamethasone 4 MG/ML injection         Some of these will need a paper prescription and others can be bought over the counter.  Ask your nurse if you have questions.     Bring a paper prescription for each of these medications     order for DME                Primary Care Provider Office Phone # Fax #    Zayra Villanueva [phone]       [address] [place] [address]        Equal Access to Services     ALEX ROSAS AH: Hadii padmini ku hadasho Soomaali, waaxda luqadaha, qaybta kaalmada adeegyada, waxay elizabeth chau. So [facility] [phone].    ATENCIÓN: Si habla español, tiene a curtis disposición servicios gratuitos de asistencia lingüística. Llame al [phone].    We comply with applicable federal civil rights laws and [place]  laws. We do not discriminate on the basis of race, color, national origin, age, disability, sex, sexual orientation, or gender identity.            Thank you!     Thank you for choosing [facility] [place]  for your care. Our goal is always to provide you with excellent care. Hearing back from our patients is one way we can continue to improve our services. Please take a few minutes to complete the written survey that you may receive in the mail after your visit with us. Thank you!             Your Updated Medication List - Protect others around you: Learn how to safely use, store and throw away your medicines at www.disposemymeds.org.          This list is accurate as of: 10/16/17 10:37 AM.  Always use your most recent med list.                   Brand Name Dispense Instructions for use Diagnosis    * albuterol (2.5 MG/3ML) 0.083% neb solution     60 vial    Take 1 vial (2.5 mg) by nebulization every 4 hours as needed for shortness of breath / dyspnea or wheezing    Acute non-recurrent maxillary sinusitis       * albuterol (2.5 MG/3ML) 0.083% neb solution     25 vial    Take 1 vial (2.5 mg) by nebulization every 6 hours as needed for shortness of breath / dyspnea or wheezing    Mild intermittent asthma with acute exacerbation       * albuterol 108 (90 BASE) MCG/ACT Inhaler    PROAIR HFA    25.5 g    USE 1 TO 2 INHALATIONS EVERY 4 TO 6 HOURS AS NEEDED    Mild intermittent asthma with acute exacerbation       cetirizine 10 MG tablet    ZYRTEC    30 tablet    Take 1 tablet by mouth daily.    Rash       cyclobenzaprine 10 MG tablet    FLEXERIL    20 tablet    1/2 tab during the day and 1 tab at bedtime as needed for muscle spasms    Acute midline thoracic back pain       dexamethasone 4 MG/ML injection    DECADRON    30 mL    Apply 1 mL (4 mg) topically once for 1 dose For physical therapy, iontophoresis    Bilateral foot pain, Posterior tibial tendon dysfunction, left, Pes planus of both feet, Posterior tibial  tendonitis, right, Venous insufficiency of both lower extremities       fluticasone 50 MCG/ACT spray    FLONASE    16 g    USE 1 TO 2 SPRAYS IN EACH NOSTRIL DAILY    Allergic rhinitis       * fluticasone-salmeterol 250-50 MCG/DOSE diskus inhaler    ADVAIR DISKUS    1 Inhaler    Inhale 1 puff into the lungs every 12 hours During asthma exacerbations    Mild intermittent asthma with acute exacerbation       * Fluticasone-Salmeterol 113-14 MCG/ACT Aepb     1 each    Inhale 1 puff into the lungs 2 times daily    Mild intermittent asthma with acute exacerbation       gabapentin 300 MG capsule    NEURONTIN    90 capsule    TAKE 1 CAPSULE AT BEDTIME    Left ankle injury, initial encounter, Chronic pain of left knee       montelukast 10 MG tablet    SINGULAIR    90 tablet    TAKE 1 TABLET DAILY    Mild persistent asthma without complication, Intermittent asthma, with acute exacerbation       * order for DME     1 Units    cpap machine and needed tubing and accessories    SUSHIL (obstructive sleep apnea)       * order for DME     1 Units    Nebulizer machine Qty #1    Asthma exacerbation, moderate persistent       * order for DME     1 Device    Ankle brace    Bilateral foot pain, Posterior tibial tendon dysfunction, left, Pes planus of both feet, Posterior tibial tendonitis, right, Venous insufficiency of both lower extremities       * predniSONE 20 MG tablet    DELTASONE    20 tablet    Take 60 mg daily for 3 days, then 40 mg daily for 3 days, then 20 mg daily for 3 days, then 10 mg for 4 days    Acute midline thoracic back pain       * predniSONE 20 MG tablet    DELTASONE    20 tablet    Take 3 tabs (60 mg) by mouth daily x 3 days, 2 tabs (40 mg) daily x 3 days, 1 tab (20 mg) daily x 3 days, then 1/2 tab (10 mg) x 3 days.    Mild intermittent asthma with acute exacerbation       SUMAtriptan 100 MG tablet    IMITREX    27 tablet    TAKE AS INSTRUCTED BY YOUR PRESCRIBER    Migraine with aura       verapamil 240 MG CR tablet     CALAN-SR    90 tablet    TAKE 1 TABLET AT BEDTIME    Migraine with aura       * Notice:  This list has 10 medication(s) that are the same as other medications prescribed for you. Read the directions carefully, and ask your doctor or other care provider to review them with you.

## 2017-10-23 ENCOUNTER — MYC MEDICAL ADVICE (OUTPATIENT)
Dept: FAMILY MEDICINE | Facility: CLINIC | Age: 62
End: 2017-10-23

## 2017-10-23 DIAGNOSIS — S99.912A LEFT ANKLE INJURY, INITIAL ENCOUNTER: ICD-10-CM

## 2017-10-23 DIAGNOSIS — M25.562 CHRONIC PAIN OF LEFT KNEE: ICD-10-CM

## 2017-10-23 DIAGNOSIS — G89.29 CHRONIC PAIN OF LEFT KNEE: ICD-10-CM

## 2017-10-24 NOTE — TELEPHONE ENCOUNTER
Gabapentin      Last Written Prescription Date:  8/21/2017  Last Fill Quantity: 90,   # refills: 0  Last Office visit:  10/5/2017    Routing refill request to provider for review/approval because:  Drug not on the [facility], [facility] or [facility] refill protocol or controlled substance      Noemi Villanueva, BS, RN, PHN  [facility] [phone]

## 2017-10-25 RX ORDER — GABAPENTIN 300 MG/1
300 CAPSULE ORAL 2 TIMES DAILY
Qty: 180 CAPSULE | Refills: 1 | Status: SHIPPED | OUTPATIENT
Start: 2017-10-25 | End: 2018-08-01

## 2017-11-20 ENCOUNTER — MYC MEDICAL ADVICE (OUTPATIENT)
Dept: FAMILY MEDICINE | Facility: CLINIC | Age: 62
End: 2017-11-20

## 2017-11-20 DIAGNOSIS — Z71.89 OTHER SPECIFIED COUNSELING: ICD-10-CM

## 2017-11-20 RX ORDER — SCOLOPAMINE TRANSDERMAL SYSTEM 1 MG/1
1 PATCH, EXTENDED RELEASE TRANSDERMAL
Qty: 5 PATCH | Refills: 1 | Status: SHIPPED | OUTPATIENT
Start: 2017-11-20 | End: 2018-06-18

## 2017-11-20 NOTE — TELEPHONE ENCOUNTER
Requested Prescriptions   Pending Prescriptions Disp Refills     scopolamine (TRANSDERM-SCOP, 1.5 MG,) 72 hr patch       Sig: Place 1 patch onto the skin every 72 hours Apply to hairless area behind one ear at least 4 hours before travel.    There is no refill protocol information for this order          LOV: 10/5/2017    See [employer] message.      Noemi Villanueva, BS, RN, PHN  [facility] [phone]

## 2017-11-22 ENCOUNTER — TELEPHONE (OUTPATIENT)
Dept: FAMILY MEDICINE | Facility: CLINIC | Age: 62
End: 2017-11-22

## 2017-11-22 NOTE — TELEPHONE ENCOUNTER
These are at the [facility] pharmacy.  Cherelle Boland RN  [facility]

## 2017-11-22 NOTE — TELEPHONE ENCOUNTER
Reason for Call:  Other prescription    Detailed comments: Pt called this afternoon and would like a nurse to give her a call back in regards to getting her a prescription for motion sickness. Pt leaves for vacation on Saturday. Please give pt a call and fill these asap. Thank you.    Phone Number Patient can be reached at: Cell number on file:    Telephone Information:   Mobile [phone]       Best Time:     Can we leave a detailed message on this number? YES    Call taken on 11/22/2017 at 12:21 PM by Wendie Yu

## 2017-11-26 ENCOUNTER — HEALTH MAINTENANCE LETTER (OUTPATIENT)
Age: 62
End: 2017-11-26

## 2018-01-24 ENCOUNTER — TELEPHONE (OUTPATIENT)
Dept: FAMILY MEDICINE | Facility: CLINIC | Age: 63
End: 2018-01-24

## 2018-01-24 DIAGNOSIS — R05.9 COUGH: Primary | ICD-10-CM

## 2018-01-24 DIAGNOSIS — R68.89 FLU-LIKE SYMPTOMS: ICD-10-CM

## 2018-01-24 RX ORDER — OSELTAMIVIR PHOSPHATE 75 MG/1
75 CAPSULE ORAL 2 TIMES DAILY
Qty: 10 CAPSULE | Refills: 0 | Status: SHIPPED | OUTPATIENT
Start: 2018-01-24 | End: 2018-01-29

## 2018-01-24 NOTE — TELEPHONE ENCOUNTER
Influenza-Like Illness (REYMUNDO) Protocol    Tasneem Chan      Age: 62 year old     YOB: 1955    Are you currently sick or have you had close contact with someone who is currently sick?   Yes, this patient is currently sick.     Adult Clinical Evaluation    Is this patient experiencing ANY of the following?  Unconsciousness or unresponsiveness No   Difficulty breathing or swallowing No   Blue or dusky lips, skin, or nail beds No   Chest pain No   Severe confusion or delirium No   Seizure activity: ongoing or stopped No   Severe dehydration or signs of shock No   Patient sounds very sick on the phone No     Is this patient experiencing ANY of the following?  Fever > 104 or shaking chills No   Wheezing with minimal response to usual wheezing medications or new wheezing No   Repeated vomiting or diarrhea with signs of dehydration (no urination within last 12 hours) No   Flu-like symptoms that initially improved but returned with fever and a worse cough No   Stiff or painful neck No   Severe headache No     Does the patient have any of the following?  Measured fever > 100 degrees Yes   Chills or feels very warm to the touch Yes, chills   Cough Yes   Sore throat Yes   Muscle/ body aches Yes   Headaches Yes   Fatigue (tiredness) Yes     Nursing Plan      Below are conditions which place adults at increased risk for the more severe complications of influenza.    Does this patient have ANY of the following conditions?  Chronic pulmonary disease such as asthma or COPD Yes   Heart disease (CHF, CAD, anticoag due to arrhythmia) No   Liver disease (hepatitis, liver failure, cirrhosis) No   Kidney disease (renal failure, insufficiency or dialysis) No   Metabolic disorder (e.g. diabetes) No   Neuromuscular disorder (YAHAIRA HERNANDEZ MD, myasthenia gravis) No   Compromised ability to handle respiratory secretions No   Hematologic disorder (e.g. sickle cell disease) No   HIV / AIDS No   Chemotherapy or radiation within  the last 3 months No   Received an organ or a bone marrow transplant No   Taking Prednisone in excess of 20mg daily No   Is age 65 years or older No   Is pregnant, thinks she may be pregnant or is within two weeks after delivery No   Is a resident of a chronic care facility No   Is patient  or [place] native  No     Patient falls into high risk category.   REYMUNDO symptom onset less than 48 hours.    Allergies   Allergen Reactions     Levaquin [Levofloxacin Hemihydrate]      diarrhea       Does this patient have an allergy or hypersensitivity to Oseltamivir (Tamiflu)?  No.      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     Left ankle injury, initial encounter     Left knee pain     Advanced directives, counseling/discussion       Last recorded GFR on this patient:   GFR Estimate   Date Value Ref Range Status   05/08/2017 >90  Non  GFR Calc   >60 mL/min/1.7m2 Final     GFR Estimate If Black   Date Value Ref Range Status   05/08/2017 >90   GFR Calc   >60 mL/min/1.7m2 Final       Sex:  female     Wt Readings from Last 1 Encounters:   10/16/17 209 lb (94.8 kg)       Age:  62 year old     Creatinine   Date Value Ref Range Status   05/08/2017 0.54 0.52 - 1.04 mg/dL Final     Creatinine Clearance Calculator    Does this patient currently have kidney disease? (defined as Chronic Kidney Disease Stage 3,4 or 5 on the problem list or a GFR less than 60 ml/min if known)  No - Tamiflu (oseltamivir):  75 mg BID x 5 days    If further questions/concerns or if new symptoms develop, call your PCP or [facility] as soon as possible.      Provided home care instructions    General  home care instruction:      Avoid contact with people in your household who are at increased risk for more severe complications of influenza (such as pregnant women or people who have a chronic health condition, for example diabetes, heart disease, asthma, or emphysema).    Stay home from work, school, childcare or other public places until your fever (37.8 degrees Celsius [100 degrees Fahrenheit]) has been gone for at least 24 hours, except to seek medical care. (Fever should be gone without the use of fever-reducing medications.) Use a surgical mask if available, or cover your mouth and nose with a tissue if possible if you need to seek medical care. Contact your work place, school, or  as they may have longer exclusion times.    You may continue to shed virus after your fever is gone. Limit your contact with high-risk individuals for 10 days after your symptoms started and be especially careful to cover your coughs/sneezes and wash your hands.    Cover your cough and wash your hands often, and especially after coughing, sneezing, blowing your nose.    Drink plenty of fluids (such as water, broth, sports drinks, electrolyte beverages for children) to prevent dehydration.    Avoid tobacco and second hand smoke.    Get plenty of rest.    Use over-the-counter pain relievers as needed per  instructions.    Do not give aspirin (acetylsalicylic acid) or products that contain aspirin (e.g. bismuth subsalicylate - Pepto Bismol) to children or teenagers 18 years or younger.    A small number of people with influenza do not have fever. If you have respiratory symptoms and are at increased risk for complications of influenza, contact your health care provider to discuss these symptoms.    For parents of infants:    If possible, only family members who are not sick should care for infants.    Wash your hands with soap and water, or an alcohol-based hand rub (if your hands are not visibly soiled) before  caring for your infant.    Cover your mouth and nose with a tissue when coughing or sneezing, and clean your hands.    Contact a health care provider to discuss your illness within 1-2 days if you are    Pregnant    Immunocompromised    Call 911 if you experience:    Difficulty breathing or shortness of breath    Pain or pressure in the chest    Confusion or less responsive than normal    Seizure activity: ongoing or stopped    Severe dehydration or signs of shock     Blue or dusky lips, skin, or nail beds    If further questions/concerns or if new symptoms develop, call your PCP or [facility] as soon as possible.    When to seek medical attention    Contact your health care provider right away if you experience:    A painful sore throat accompanied by fever persists for more than 48 hours    Ear pain, sinus pain, persistent vomiting and/or diarrhea    Oral temperature greater than 104  Fahrenheit (40  Celsius)    Dehydration (e.g., mouth feeling dry, dizzy when sitting/standing, decreased urine output)    Severe or persistent vomiting; unable to keep fluids down    Improvement in flu-like symptoms (fever and cough or sore throat) but then return of fever and worse cough or sore throat    Not drinking enough fluid    Any other concerns not stated above      Additional educational resources include:    http://www.mdhflu.com    http://www.cdc.gov/flu/  Chiquis Boland. Put medication through to [facility] per protocol and timeframe of illness sx as well as asthma hx.

## 2018-01-24 NOTE — TELEPHONE ENCOUNTER
1/24/2018    Call Regarding Preventive Health Screening Colonoscopy, Mammogram and Cervical/PAP    Attempt 2    Message on voicemail    Comments:       Outreach   Valorie Whyte

## 2018-01-24 NOTE — TELEPHONE ENCOUNTER
Reason for call:  Patient reporting a symptom    Symptom or request: The patient called saying she has fever, achy, sore throat, coughing, and headache. She says she is also an asthmatic. She is wondering if she can get something prescribed like Tamiflu.    Duration (how long have symptoms been present): Last night onset    Have you been treated for this before? No    Phone Number patient can be reached at:  Home number on file [phone] (home)    Best Time:  Anytime    Can we leave a detailed message on this number:  YES    Call taken on 1/24/2018 at 8:52 AM by Neha Poe

## 2018-01-28 ENCOUNTER — HEALTH MAINTENANCE LETTER (OUTPATIENT)
Age: 63
End: 2018-01-28

## 2018-03-10 NOTE — TELEPHONE ENCOUNTER
3/9/2018    Call Regarding Preventive Health Screening Colonoscopy, Mammogram and Cervical/PAP    Attempt 3    Message on voicemail    Comments:       Outreach   Verna Sosa

## 2018-03-15 ENCOUNTER — ALLIED HEALTH/NURSE VISIT (OUTPATIENT)
Dept: FAMILY MEDICINE | Facility: CLINIC | Age: 63
End: 2018-03-15

## 2018-03-15 ENCOUNTER — TELEPHONE (OUTPATIENT)
Dept: FAMILY MEDICINE | Facility: CLINIC | Age: 63
End: 2018-03-15

## 2018-03-15 VITALS — DIASTOLIC BLOOD PRESSURE: 68 MMHG | SYSTOLIC BLOOD PRESSURE: 98 MMHG

## 2018-03-15 DIAGNOSIS — Z01.30 BLOOD PRESSURE CHECK: Primary | ICD-10-CM

## 2018-03-15 PROCEDURE — 99207 ZZC NO CHARGE NURSE ONLY: CPT | Performed by: FAMILY MEDICINE

## 2018-03-15 NOTE — PROGRESS NOTES
Tasneem Chan is enrolled/participating in the retail pharmacy Blood Pressure Goals Achievement Program (BPGAP).  Tasneem Chan was evaluated at [facility] on March 15, 2018 at which time her blood pressure was:    BP Readings from Last 3 Encounters:   03/15/18 98/68   10/16/17 118/76   10/05/17 122/72     Reviewed lifestyle modifications for blood pressure control and reduction: including making healthy food choices, managing weight, getting regular exercise, smoking cessation, reducing alcohol consumption, monitoring blood pressure regularly.     Tasneem Chan is not experiencing symptoms.    Follow-Up: BP is at goal of < 140/90mmHg (patient 18+ years of age with or without diabetes).  Recommended follow-up at pharmacy in 6 months.     Recommendation to Provider: had been refused dental cleaning this week due to 'high blood pressure.'  Pt takes verapamil for headache prevention.  No other symptoms, has appt with primary care in 1-2 weeks.  No changes    Tasneem Chan was evaluated for enrollment into the PGEN study today.    Patient eligible for enrollment:  No  Patient interested in enrollment:  No    Completed by: Thank you,  Sanaz Benítez [facility], [facility] [phone]

## 2018-03-15 NOTE — MR AVS SNAPSHOT
After Visit Summary   3/15/2018    Tasneem Chan    MRN: [id number]           Patient Information     Date Of Birth          1955        Visit Information        Provider Department      3/15/2018 2:00 PM Zayra Villanueva MD [facility]        Today's Diagnoses     Blood pressure check    -  1       Follow-ups after your visit        Your next 10 appointments already scheduled     Mar 22, 2018 11:45 AM CDT   Office Visit with Zayra Villanueva MD   [facility] ([facility])    [address] [place] [id number]   [phone]           Bring a current list of meds and any records pertaining to this visit. For Physicals, please bring immunization records and any forms needing to be filled out. Please arrive 10 minutes early to complete paperwork.              Who to contact     If you have questions or need follow up information about today's clinic visit or your schedule please contact [facility] directly at [phone].  Normal or non-critical lab and imaging results will be communicated to you by proVITALhart, letter or phone within 4 business days after the clinic has received the results. If you do not hear from us within 7 days, please contact the clinic through Fubl[employer] or phone. If you have a critical or abnormal lab result, we will notify you by phone as soon as possible.  Submit refill requests through [employer] or call your pharmacy and they will forward the refill request to us. Please allow 3 business days for your refill to be completed.          Additional Information About Your Visit        proVITALhart Information     [employer] gives you secure access to your electronic health record. If you see a primary care provider, you can also send messages to your care team and make appointments. If you have questions, please call your primary care clinic.  If you do not have a primary care provider,  please call [phone] and they will assist you.        Care EveryWhere ID     This is your Care EveryWhere ID. This could be used by other organizations to access your [facility] medical records  [id number]         Blood Pressure from Last 3 Encounters:   03/15/18 98/68   10/16/17 118/76   10/05/17 122/72    Weight from Last 3 Encounters:   10/16/17 209 lb (94.8 kg)   10/05/17 209 lb (94.8 kg)   07/20/17 206 lb (93.4 kg)              Today, you had the following     No orders found for display         Today's Medication Changes          These changes are accurate as of 3/15/18  2:02 PM.  If you have any questions, ask your nurse or doctor.               These medicines have changed or have updated prescriptions.        Dose/Directions    * predniSONE 20 MG tablet   Commonly known as:  DELTASONE   This may have changed:    - when to take this  - reasons to take this  - additional instructions   Used for:  Acute midline thoracic back pain        Take 60 mg daily for 3 days, then 40 mg daily for 3 days, then 20 mg daily for 3 days, then 10 mg for 4 days   Quantity:  20 tablet   Refills:  0       * predniSONE 20 MG tablet   Commonly known as:  DELTASONE   This may have changed:  Another medication with the same name was changed. Make sure you understand how and when to take each.   Used for:  Mild intermittent asthma with acute exacerbation        Take 3 tabs (60 mg) by mouth daily x 3 days, 2 tabs (40 mg) daily x 3 days, 1 tab (20 mg) daily x 3 days, then 1/2 tab (10 mg) x 3 days.   Quantity:  20 tablet   Refills:  0       * Notice:  This list has 2 medication(s) that are the same as other medications prescribed for you. Read the directions carefully, and ask your doctor or other care provider to review them with you.             Primary Care Provider Office Phone # Fax #    Zayra Villanueva [phone] [address] [facility] [address]        Equal Access to Services     [facility]  GAAR : Hadii aad ku hadwesly Bassali, waaxda luqadaha, qaybta kaalmada kirit, dustin mimiin hayaasheryl rosales freedom pura . So [facility] [phone].    ATENCIÓN: Si habla español, tiene a curtis disposición servicios gratuitos de asistencia lingüística. Llame al [phone].    We comply with applicable federal civil rights laws and [place] laws. We do not discriminate on the basis of race, color, national origin, age, disability, sex, sexual orientation, or gender identity.            Thank you!     Thank you for choosing [facility]  for your care. Our goal is always to provide you with excellent care. Hearing back from our patients is one way we can continue to improve our services. Please take a few minutes to complete the written survey that you may receive in the mail after your visit with us. Thank you!             Your Updated Medication List - Protect others around you: Learn how to safely use, store and throw away your medicines at www.disposemymeds.org.          This list is accurate as of 3/15/18  2:02 PM.  Always use your most recent med list.                   Brand Name Dispense Instructions for use Diagnosis    * albuterol (2.5 MG/3ML) 0.083% neb solution     60 vial    Take 1 vial (2.5 mg) by nebulization every 4 hours as needed for shortness of breath / dyspnea or wheezing    Acute non-recurrent maxillary sinusitis       * albuterol (2.5 MG/3ML) 0.083% neb solution     25 vial    Take 1 vial (2.5 mg) by nebulization every 6 hours as needed for shortness of breath / dyspnea or wheezing    Mild intermittent asthma with acute exacerbation       * albuterol 108 (90 BASE) MCG/ACT Inhaler    PROAIR HFA    25.5 g    USE 1 TO 2 INHALATIONS EVERY 4 TO 6 HOURS AS NEEDED    Mild intermittent asthma with acute exacerbation       cetirizine 10 MG tablet    ZYRTEC    30 tablet    Take 1 tablet by mouth daily.    Rash       cyclobenzaprine 10 MG tablet    FLEXERIL    20 tablet    1/2 tab during the  day and 1 tab at bedtime as needed for muscle spasms    Acute midline thoracic back pain       dexamethasone 4 MG/ML injection    DECADRON    30 mL    Apply 1 mL (4 mg) topically once for 1 dose For physical therapy, iontophoresis    Bilateral foot pain, Posterior tibial tendon dysfunction, left, Pes planus of both feet, Posterior tibial tendonitis, right, Venous insufficiency of both lower extremities       fluticasone 50 MCG/ACT spray    FLONASE    16 g    USE 1 TO 2 SPRAYS IN EACH NOSTRIL DAILY    Allergic rhinitis       * fluticasone-salmeterol 250-50 MCG/DOSE diskus inhaler    ADVAIR DISKUS    1 Inhaler    Inhale 1 puff into the lungs every 12 hours During asthma exacerbations    Mild intermittent asthma with acute exacerbation       * Fluticasone-Salmeterol 113-14 MCG/ACT Aepb inhaler    AIRDUO RESPICLICK    1 each    Inhale 1 puff into the lungs 2 times daily    Mild intermittent asthma with acute exacerbation       gabapentin 300 MG capsule    NEURONTIN    180 capsule    Take 1 capsule (300 mg) by mouth 2 times daily    Left ankle injury, initial encounter, Chronic pain of left knee       montelukast 10 MG tablet    SINGULAIR    90 tablet    TAKE 1 TABLET DAILY    Mild persistent asthma without complication, Intermittent asthma, with acute exacerbation       * order for DME     1 Units    cpap machine and needed tubing and accessories    SUSHIL (obstructive sleep apnea)       * order for DME     1 Units    Nebulizer machine Qty #1    Asthma exacerbation, moderate persistent       * order for DME     1 Device    Ankle brace    Bilateral foot pain, Posterior tibial tendon dysfunction, left, Pes planus of both feet, Posterior tibial tendonitis, right, Venous insufficiency of both lower extremities       * predniSONE 20 MG tablet    DELTASONE    20 tablet    Take 60 mg daily for 3 days, then 40 mg daily for 3 days, then 20 mg daily for 3 days, then 10 mg for 4 days    Acute midline thoracic back pain       *  predniSONE 20 MG tablet    DELTASONE    20 tablet    Take 3 tabs (60 mg) by mouth daily x 3 days, 2 tabs (40 mg) daily x 3 days, 1 tab (20 mg) daily x 3 days, then 1/2 tab (10 mg) x 3 days.    Mild intermittent asthma with acute exacerbation       scopolamine 72 hr patch    TRANSDERM-SCOP (1.5 MG)    5 patch    Place 1 patch onto the skin every 72 hours Apply to hairless area behind one ear at least 4 hours before travel.    Other specified counseling       SUMAtriptan 100 MG tablet    IMITREX    27 tablet    TAKE AS INSTRUCTED BY YOUR PRESCRIBER    Migraine with aura       verapamil 240 MG CR tablet    CALAN-SR    90 tablet    TAKE 1 TABLET AT BEDTIME    Migraine with aura       * Notice:  This list has 10 medication(s) that are the same as other medications prescribed for you. Read the directions carefully, and ask your doctor or other care provider to review them with you.

## 2018-03-15 NOTE — TELEPHONE ENCOUNTER
Reason for Call:  Other call back    Detailed comments: pt would like to see dr patrick on the 21st going out of town and has blood pressure issue  doesnt want to see anyone else and is annoyed she can never see her primary when she want    Phone Number Patient can be reached at: Home number on file [phone] (home)    Best Timeany    Can we leave a detailed message on this number? YES    Call taken on 3/15/2018 at 9:56 AM by Sarah Lutz

## 2018-03-15 NOTE — TELEPHONE ENCOUNTER
Pt has had headaches. Dizziness that is intermittent when rising from sitting or stepping off treadmill at the gym after working out.    Vision changes? no  Palpitations? no  Chest pain? No  N/V- no    Pt has history of migraines as well and took their verapamil today. They will come to the pharmacy today to have this checked and report to us their result.    Cherelle Boland RN  [facility]

## 2018-03-15 NOTE — TELEPHONE ENCOUNTER
"Called patient and scheduled appointment.  Patient had concerns regarding blood pressure.  She stated that she was at the dentist 2 weeks ago and was told that her blood pressure was too high and they weren't able to continue with the visit, but she doesn't remember the reading.  She also was at [employer] recently and decided to take it there and it came back at \"high risk\" but doesn't remember what the reading was.  She would like a call back regarding any recommendations prior to her appointment with Dr. Villanueva on 3/22/18.  She said that she can either be reached at her home number: [phone] or her cell number: [phone] and it is okay to leave a detailed message.    Routing to RV Triage.    Anne Khalil CMA  "

## 2018-03-22 ENCOUNTER — OFFICE VISIT (OUTPATIENT)
Dept: FAMILY MEDICINE | Facility: CLINIC | Age: 63
End: 2018-03-22
Payer: COMMERCIAL

## 2018-03-22 VITALS
SYSTOLIC BLOOD PRESSURE: 118 MMHG | OXYGEN SATURATION: 97 % | BODY MASS INDEX: 33.11 KG/M2 | WEIGHT: 206 LBS | HEART RATE: 68 BPM | HEIGHT: 66 IN | DIASTOLIC BLOOD PRESSURE: 78 MMHG | TEMPERATURE: 98.4 F

## 2018-03-22 DIAGNOSIS — R03.0 BLOOD PRESSURE ELEVATED WITHOUT HISTORY OF HTN: Primary | ICD-10-CM

## 2018-03-22 DIAGNOSIS — H90.5 HEARING LOSS, SENSORINEURAL, HIGH FREQUENCY, RIGHT: ICD-10-CM

## 2018-03-22 DIAGNOSIS — G43.719 INTRACTABLE CHRONIC MIGRAINE WITHOUT AURA AND WITHOUT STATUS MIGRAINOSUS: ICD-10-CM

## 2018-03-22 DIAGNOSIS — G44.52 NEW DAILY PERSISTENT HEADACHE: ICD-10-CM

## 2018-03-22 DIAGNOSIS — M94.9 OSTEOCHONDRAL LESION OF TALAR DOME: ICD-10-CM

## 2018-03-22 DIAGNOSIS — C50.911 MALIGNANT NEOPLASM OF RIGHT BREAST IN FEMALE, ESTROGEN RECEPTOR POSITIVE, UNSPECIFIED SITE OF BREAST (H): ICD-10-CM

## 2018-03-22 DIAGNOSIS — Z12.11 SCREEN FOR COLON CANCER: ICD-10-CM

## 2018-03-22 DIAGNOSIS — D33.3 BENIGN NEOPLASM OF CRANIAL NERVE (H): ICD-10-CM

## 2018-03-22 DIAGNOSIS — Z17.0 MALIGNANT NEOPLASM OF RIGHT BREAST IN FEMALE, ESTROGEN RECEPTOR POSITIVE, UNSPECIFIED SITE OF BREAST (H): ICD-10-CM

## 2018-03-22 DIAGNOSIS — Z02.89 ENCOUNTER FOR COMPLETION OF FORM WITH PATIENT: ICD-10-CM

## 2018-03-22 DIAGNOSIS — M89.9 OSTEOCHONDRAL LESION OF TALAR DOME: ICD-10-CM

## 2018-03-22 DIAGNOSIS — M25.572 CHRONIC PAIN OF LEFT ANKLE: ICD-10-CM

## 2018-03-22 DIAGNOSIS — G89.29 CHRONIC PAIN OF LEFT ANKLE: ICD-10-CM

## 2018-03-22 DIAGNOSIS — Z12.31 VISIT FOR SCREENING MAMMOGRAM: ICD-10-CM

## 2018-03-22 LAB
ALBUMIN SERPL-MCNC: 3.7 G/DL (ref 3.4–5)
ALP SERPL-CCNC: 69 U/L (ref 40–150)
ALT SERPL W P-5'-P-CCNC: 21 U/L (ref 0–50)
ANION GAP SERPL CALCULATED.3IONS-SCNC: 3 MMOL/L (ref 3–14)
AST SERPL W P-5'-P-CCNC: 21 U/L (ref 0–45)
BILIRUB SERPL-MCNC: 0.4 MG/DL (ref 0.2–1.3)
BUN SERPL-MCNC: 13 MG/DL (ref 7–30)
CALCIUM SERPL-MCNC: 9.1 MG/DL (ref 8.5–10.1)
CHLORIDE SERPL-SCNC: 108 MMOL/L (ref 94–109)
CO2 SERPL-SCNC: 30 MMOL/L (ref 20–32)
CREAT SERPL-MCNC: 0.57 MG/DL (ref 0.52–1.04)
GFR SERPL CREATININE-BSD FRML MDRD: >90 ML/MIN/1.7M2
GLUCOSE SERPL-MCNC: 98 MG/DL (ref 70–99)
POTASSIUM SERPL-SCNC: 4.4 MMOL/L (ref 3.4–5.3)
PROT SERPL-MCNC: 7.5 G/DL (ref 6.8–8.8)
SODIUM SERPL-SCNC: 141 MMOL/L (ref 133–144)

## 2018-03-22 PROCEDURE — 99215 OFFICE O/P EST HI 40 MIN: CPT | Performed by: FAMILY MEDICINE

## 2018-03-22 PROCEDURE — 36415 COLL VENOUS BLD VENIPUNCTURE: CPT | Performed by: FAMILY MEDICINE

## 2018-03-22 PROCEDURE — 80053 COMPREHEN METABOLIC PANEL: CPT | Performed by: FAMILY MEDICINE

## 2018-03-22 NOTE — TELEPHONE ENCOUNTER
BP Readings from Last 6 Encounters:   03/15/18 98/68   10/16/17 118/76   10/05/17 122/72   07/20/17 120/78   05/08/17 130/82   02/07/17 126/80     Pt saw pharmacy. Pt is scheduled with provider.     Cherelle Boland RN  [facility]

## 2018-03-22 NOTE — NURSING NOTE
"Chief Complaint   Patient presents with     Hypertension       Initial /78 (BP Location: Left arm, Patient Position: Chair, Cuff Size: Adult Large)  Pulse 68  Temp 98.4  F (36.9  C) (Oral)  Ht 5' 6\" (1.676 m)  Wt 206 lb (93.4 kg)  SpO2 97%  BMI 33.25 kg/m2 Estimated body mass index is 33.25 kg/(m^2) as calculated from the following:    Height as of this encounter: 5' 6\" (1.676 m).    Weight as of this encounter: 206 lb (93.4 kg).  Medication Reconciliation: complete   Anne Khalil CMA  "

## 2018-03-22 NOTE — PROGRESS NOTES
"  SUBJECTIVE:                                                    Tasneem Chan is a 62 year old female who presents to clinic today for the following health issues:    Patient is here regarding a concern with blood pressure. She was at the dentist about 3 weeks ago and was that her blood pressure was too high and they weren't able to continue with the visit, but she doesn't remember the reading.  She also went to [facility] more recently and took her blood pressure in the automated machine which told her she was at \"high risk\" but doesn't remember what the reading was - ? 129/99.  She was seen at the [facility] on 3/15/2018 and her blood pressure was 98/68.  She was seen again at the dentist on 3/19/2018 and her blood pressure was 128/97 using a wrist blood pressure cuff. Had 2-3 cups of coffee the am of her dentist's appt.   Has been having weird headaches - all over her head, like a pressure - new. No changes in speech, swallowing, hearing, coordination  or vision.  No numbness, tingling, or weakness in upper or lower extremities. No bowel or bladder control problems. . Headache goes away with 1 .-200mg Ibuprofen . Just had eyes checked about 6 months ago.     BP Readings from Last 5 Encounters:   03/22/18 118/78   03/15/18 98/68   10/16/17 118/76   10/05/17 122/72   07/20/17 120/78     Doesn't have a hx of hypertension.   Goes to [facility] and walks on the treadmill - every day - does 1.5 miles on the treadmill and 2 miles on the stationary bicycle.  Left ankle pain - worse by walking - has a hx of strain/tendonitis of posterior tibialis tendon and talar dome chrondral defect.  Has orthotics and special supportive walking shoes.  Requesting a handicapped parking permit.       Problem list and histories reviewed & adjusted, as indicated.  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 5 patch 1     gabapentin (NEURONTIN) 300 MG capsule Take 1 capsule (300 mg) by mouth 2 times daily 180 capsule 1     order for [facility] Ankle brace 1 Device 0     fluticasone-salmeterol (ADVAIR DISKUS) 250-50 MCG/DOSE diskus inhaler Inhale 1 puff into the lungs every 12 hours During asthma exacerbations 1 Inhaler 11     albuterol (2.5 MG/3ML) 0.083% neb solution Take 1 vial (2.5 mg) by nebulization every 6 hours as needed for shortness of breath / dyspnea or wheezing 25 vial 11     albuterol (PROAIR HFA) 108 (90 BASE) MCG/ACT Inhaler USE 1 TO 2 INHALATIONS EVERY 4 TO 6 HOURS AS NEEDED 25.5 g 11     cyclobenzaprine (FLEXERIL) 10 MG tablet 1/2 tab during the day and 1 tab at bedtime as needed for muscle spasms 20 tablet 0     montelukast (SINGULAIR) 10 MG tablet TAKE 1 TABLET DAILY 90 tablet 3     verapamil (CALAN-SR) 240 MG CR tablet TAKE 1 TABLET AT BEDTIME 90 tablet 3     fluticasone (FLONASE) 50 MCG/ACT spray USE 1 TO 2 SPRAYS IN EACH NOSTRIL DAILY 16 g 1     order for [facility] Nebulizer machine Qty #1  "1 Units 1     ORDER FOR DME cpap machine and needed tubing and accessories 1 Units 1     cetirizine (ZYRTEC) 10 MG tablet Take 1 tablet by mouth daily. 30 tablet 11     [DISCONTINUED] Fluticasone-Salmeterol 113-14 MCG/ACT AEPB Inhale 1 puff into the lungs 2 times daily 1 each 11     [DISCONTINUED] albuterol (2.5 MG/3ML) 0.083% neb solution Take 1 vial (2.5 mg) by nebulization every 4 hours as needed for shortness of breath / dyspnea or wheezing 60 vial 3          Allergies   Allergen Reactions     Levaquin [Levofloxacin Hemihydrate]      diarrhea          GFR Estimate   Date Value Ref Range Status   05/08/2017 >90  Non  GFR Calc   >60 mL/min/1.7m2 Final   09/26/2014 >90  Non  GFR Calc   >60 mL/min/1.7m2 Final     GFR Estimate If Black   Date Value Ref Range Status   05/08/2017 >90   GFR Calc   >60 mL/min/1.7m2 Final   09/26/2014 >90   GFR Calc   >60 mL/min/1.7m2 Final       ROS:   ROS: 12 point ROS neg other than the symptoms noted above    OBJECTIVE:                                                    /78 (BP Location: Left arm, Patient Position: Chair, Cuff Size: Adult Large)  Pulse 68  Temp 98.4  F (36.9  C) (Oral)  Ht 5' 6\" (1.676 m)  Wt 206 lb (93.4 kg)  SpO2 97%  BMI 33.25 kg/m2  Body mass index is 33.25 kg/(m^2).   GENERAL: healthy, alert, well nourished, well hydrated, no distress  HENT: ear canals- normal; TMs- normal; Nose- normal; Mouth- no ulcers, no lesions  NECK: no tenderness, no adenopathy, no asymmetry, no masses, no stiffness; thyroid- normal to palpation  RESP: lungs clear to auscultation - no rales, no rhonchi, no wheezes  CV: regular rates and rhythm, normal S1 S2, no S3 or S4 and no murmur, no click or rub -  ABDOMEN: soft, no tenderness, no  hepatosplenomegaly, no masses, normal bowel sounds  MS: extremities- no gross deformities noted, no edema.   PERRL, EOMI. Fundoscopic exam reveals sharp discs bilaterally, no " evidence of papilledema. Visual fields are fully intact by confrontation.   Cranial nerves 2-12 are fully intact.     Muscle strength is 5/5 at the biceps, triceps, brachioradialis,  strength and finger spread as well as hip flexors and extendors, quadriceps, hamstrings, foot dorsi- and plantar flexion as well as extensor hallucis longus bilaterally.   Reflexes are brisk and symmetric at the biceps, triceps, brachioradialis, patellar, and Achilles tendons bilaterally.  Toes are down going bilaterally.   Gait is normal. Rhomberg is negative. No pronator drift.  Fine finger movements and finger-nose-finger fully intact. Visual fields fully intact by confrontation.    Limited range of motion left ankle with flexion and extension .     Diagnostic test results:  See [facility] orders.      ASSESSMENT/PLAN:                                                        ICD-10-CM    1. Blood pressure elevated without history of HTN R03.0 Comprehensive metabolic panel   2. New daily persistent headache G44.52    3. Visit for screening mammogram Z12.31 MA Screen Bilateral w/Roman   4. Intractable chronic migraine without aura and without status migrainosus G43.719 NEUROLOGY ADULT REFERRAL     MR Brain w/o & w Contrast   5. Screen for colon cancer Z12.11 GASTROENTEROLOGY ADULT REF PROCEDURE ONLY [facility] [phone]   6. 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 Benign neoplasm of cranial nerve (H) D33.3 NEUROLOGY ADULT REFERRAL     MR Brain w/o & w Contrast   8. Chronic pain of left ankle M25.572 ORTHO  REFERRAL    G89.29    9. Malignant neoplasm of right breast in female, estrogen receptor positive, unspecified site of breast (H) C50.911     Z17.0    10. Encounter for completion of form with patient - handicapped parking permit for left ankle OA Z02.89    11. 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   See Patient Instructions.     Very stressful right now- helping to care for her 81 yr old mother with sudden onset dementia in [place] - flies out there 2x/month . May need to move mother here, but mother is refusing to move at this time.   Pt requested a handicapped parking permit secondary to the left ankle osteoarthritis changes - especially at the airport - if she has to walk, she gets signif. Left ankle swelling and pain.      Spent  40 minutes on pt care today outside of preventative care. All face to face time from 12:35pm  to 1:15pm.  Greater than 50% of time spent in coordination of care/counseling today re:  1. Blood pressure elevated without history of HTN    2. New daily persistent headache    3. Visit for screening mammogram    4. Intractable chronic migraine without aura and without status migrainosus    5. Screen for colon cancer    6. 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 Benign neoplasm of cranial nerve (H)    8. Chronic pain of left ankle    9. Malignant neoplasm of right breast in female, estrogen receptor positive, unspecified site of breast (H)    10. Encounter for completion of form with patient - handicapped parking permit for left ankle OA    11. Osteochondral chondral defect of talar dome - left                  Zayra Villanueva MD  [facility]

## 2018-03-22 NOTE — MR AVS SNAPSHOT
After Visit Summary   3/22/2018    Tasneem Chan    MRN: [id number]           Patient Information     Date Of Birth          1955        Visit Information        Provider Department      3/22/2018 11:45 AM Zayra Villanueva MD [facility]        Today's Diagnoses     Intractable chronic migraine without aura and without status migrainosus    -  1    New daily persistent headache        Blood pressure elevated without history of HTN        Visit for screening mammogram        Need for hepatitis C screening test        Need for prophylactic vaccination with tetanus-diphtheria (TD)        Screen for colon cancer        Hearing loss, sensorineural, high frequency, 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                Thank you for choosing [facility]  for your Health Care. It was a pleasure seeing you at your visit today. Please contact us with any questions or concerns you may have.                   Zayra Villanueva MD                                  To reach your [facility] care team after hours call:   [phone]    Our clinic hours are:     Monday- 7:30 am - 7:00 pm                             Tuesday through Friday- 7:30 am - 5:00 pm                                        Saturday- 8:00 am - 12:00 pm                  Phone:  [phone]    Our pharmacy hours are:     Monday  8:00 am to 7:00 pm      Tuesday through Friday 8:00am to 6:00pm                        Saturday - 9:00 am to  1:00 pm      Sunday : Closed.              Phone:  [phone]      There is also information available at our web site:  [email or web address]    If your provider ordered any lab tests and you do not receive the results within 10 business days, please call the clinic.    If you need a medication refill please contact your pharmacy.  Please allow 2 business days for your refill to be completed.    Our clinic offers telephone visits and e visits.  Please ask one of your team members to explain more.      Use [employer] (secure email communication and access to your chart) to send your primary care provider a message or make an appointment. Ask someone on your Team how to sign up for MyQuoteAppt.                       Follow-ups after your visit        Additional Services     GASTROENTEROLOGY ADULT REF PROCEDURE ONLY [facility] [place] [phone]       Last Lab Result: Creatinine (mg/dL)       Date                     Value                 05/08/2017               0.54             ----------  Body mass index is 33.25 kg/(m^2).     Needed:  No  Language:  English    Patient will be contacted to schedule procedure.     Please be aware that coverage of these services is subject to the terms and limitations of your health insurance plan.  Call member services at your health plan with any benefit or coverage questions.  Any procedures must be performed at a [facility] facility OR coordinated by your clinic's referral office.    Please bring the following with you to your appointment:    (1) Any X-Rays, CTs or MRIs which have been performed.  Contact the facility where they were done to arrange for  prior to your scheduled appointment.    (2) List of current medications   (3) This referral request   (4) Any documents/labs given to you for this referral            NEUROLOGY ADULT REFERRAL       Your provider has referred you for the following:   Consult at [facility]: Neurology [facility] - [place] [phone]    [email or web address]  General Neurology    Please be aware that coverage of these services is subject to the terms and limitations of your health insurance plan.  Call member services at your health plan with any benefit or coverage questions.      Please bring the following with you to your appointment:    (1) Any X-Rays, CTs or MRIs which have been performed.  Contact the facility where they were done to arrange for  prior to your scheduled appointment.    (2) List of current medications  (3) This referral request   (4) Any documents/labs given to you for this referral            ORTHO  REFERRAL       [facility] is referring you to the Orthopedic  Services at [facility].       The  Representative will assist you in the coordination of your Orthopedic and Musculoskeletal Care as prescribed by your physician.    The  Representative will call you within 1 business day to help schedule your appointment, or you may contact the  Representative at:    All areas ~ [phone]     Type of Referral : Surgical / Specialist       Timeframe requested: Within 2 weeks    Coverage of these services is subject to the terms and limitations of your health insurance plan.  Please call member services at your health plan with any benefit or coverage questions.      If X-rays, CT or MRI's have been performed, please contact the facility where they were done to arrange for , prior to your scheduled appointment.  Please bring this referral request to your appointment and present it to your specialist.                  Your next 10 appointments already scheduled     Jun 04, 2018  9:00 AM CDT   PHYSICAL with Zayra Villanueva MD   [facility]    [address] [place] [id number] [phone]              Future tests that were ordered  "for you today     Open Future Orders        Priority Expected Expires Ordered    MA Screen Bilateral w/Roman Routine  3/22/2019 3/22/2018    MR Brain w/o & w Contrast Routine  3/22/2019 3/22/2018            Who to contact     If you have questions or need follow up information about today's clinic visit or your schedule please contact [facility] directly at [phone].  Normal or non-critical lab and imaging results will be communicated to you by [employer], letter or phone within 4 business days after the clinic has received the results. If you do not hear from us within 7 days, please contact the clinic through [employer] or phone. If you have a critical or abnormal lab result, we will notify you by phone as soon as possible.  Submit refill requests through [employer] or call your pharmacy and they will forward the refill request to us. Please allow 3 business days for your refill to be completed.          Additional Information About Your Visit        [employer] Information     [employer] gives you secure access to your electronic health record. If you see a primary care provider, you can also send messages to your care team and make appointments. If you have questions, please call your primary care clinic.  If you do not have a primary care provider, please call [phone] and they will assist you.        Care EveryWhere ID     This is your Care EveryWhere ID. This could be used by other organizations to access your [facility] medical records  [id number]        Your Vitals Were     Pulse Temperature Height Pulse Oximetry BMI (Body Mass Index)       68 98.4  F (36.9  C) (Oral) 5' 6\" (1.676 m) 97% 33.25 kg/m2        Blood Pressure from Last 3 Encounters:   03/22/18 118/78   03/15/18 98/68   10/16/17 118/76    Weight from Last 3 Encounters:   03/22/18 206 lb (93.4 kg)   10/16/17 209 lb (94.8 kg)   10/05/17 209 lb (94.8 kg)              We Performed the Following     Comprehensive metabolic panel     " GASTROENTEROLOGY ADULT REF PROCEDURE ONLY [facility]  [phone]     NEUROLOGY ADULT REFERRAL     ORTHO [facility] REFERRAL          Today's Medication Changes          These changes are accurate as of 3/22/18  1:09 PM.  If you have any questions, ask your nurse or doctor.               These medicines have changed or have updated prescriptions.        Dose/Directions    * albuterol (2.5 MG/3ML) 0.083% neb solution   This may have changed:  Another medication with the same name was removed. Continue taking this medication, and follow the directions you see here.   Used for:  Mild intermittent asthma with acute exacerbation   Changed by:  Zayra Villanueva MD        Dose:  1 vial   Take 1 vial (2.5 mg) by nebulization every 6 hours as needed for shortness of breath / dyspnea or wheezing   Quantity:  25 vial   Refills:  11       * albuterol 108 (90 BASE) MCG/ACT Inhaler   Commonly known as:  PROAIR HFA   This may have changed:  Another medication with the same name was removed. Continue taking this medication, and follow the directions you see here.   Used for:  Mild intermittent asthma with acute exacerbation   Changed by:  Zayra Villanueva MD        USE 1 TO 2 INHALATIONS EVERY 4 TO 6 HOURS AS NEEDED   Quantity:  25.5 g   Refills:  11       fluticasone-salmeterol 250-50 MCG/DOSE diskus inhaler   Commonly known as:  ADVAIR DISKUS   This may have changed:  Another medication with the same name was removed. Continue taking this medication, and follow the directions you see here.   Used for:  Mild intermittent asthma with acute exacerbation   Changed by:  Zayra Villanueva MD        Dose:  1 puff   Inhale 1 puff into the lungs every 12 hours During asthma exacerbations   Quantity:  1 Inhaler   Refills:  11       * Notice:  This list has 2 medication(s) that are the same as other medications prescribed for you. Read the directions carefully, and ask your doctor or other care provider to review  them with you.      Stop taking these medicines if you haven't already. Please contact your care team if you have questions.     predniSONE 20 MG tablet   Commonly known as:  DELTASONE   Stopped by:  Zayra Villanueva MD                    Primary Care Provider Office Phone # Fax #    Zayra Villanueva [phone]       [address] [place] [id number]        Equal Access to Services     [facility]: Hadii aad ku hadasho Soomaali, waaxda luqadaha, qaybta kaalmada adeegyada, waxay idiin hayaan adeeg frankcammie pura . So [facility] [phone].    ATENCIÓN: Si habla español, tiene a curtis disposición servicios gratuitos de asistencia lingüística. Llame al [phone].    We comply with applicable federal civil rights laws and [place] laws. We do not discriminate on the basis of race, color, national origin, age, disability, sex, sexual orientation, or gender identity.            Thank you!     Thank you for choosing [facility]  for your care. Our goal is always to provide you with excellent care. Hearing back from our patients is one way we can continue to improve our services. Please take a few minutes to complete the written survey that you may receive in the mail after your visit with us. Thank you!             Your Updated Medication List - Protect others around you: Learn how to safely use, store and throw away your medicines at www.disposemymeds.org.          This list is accurate as of 3/22/18  1:09 PM.  Always use your most recent med list.                   Brand Name Dispense Instructions for use Diagnosis    * albuterol (2.5 MG/3ML) 0.083% neb solution     25 vial    Take 1 vial (2.5 mg) by nebulization every 6 hours as needed for shortness of breath / dyspnea or wheezing    Mild intermittent asthma with acute exacerbation       * albuterol 108 (90 BASE) MCG/ACT Inhaler    PROAIR HFA    25.5 g    USE 1 TO 2 INHALATIONS EVERY 4 TO 6 HOURS AS NEEDED    Mild  intermittent asthma with acute exacerbation       cetirizine 10 MG tablet    ZYRTEC    30 tablet    Take 1 tablet by mouth daily.    Rash       cyclobenzaprine 10 MG tablet    FLEXERIL    20 tablet    1/2 tab during the day and 1 tab at bedtime as needed for muscle spasms    Acute midline thoracic back pain       fluticasone 50 MCG/ACT spray    FLONASE    16 g    USE 1 TO 2 SPRAYS IN EACH NOSTRIL DAILY    Allergic rhinitis       fluticasone-salmeterol 250-50 MCG/DOSE diskus inhaler    ADVAIR DISKUS    1 Inhaler    Inhale 1 puff into the lungs every 12 hours During asthma exacerbations    Mild intermittent asthma with acute exacerbation       gabapentin 300 MG capsule    NEURONTIN    180 capsule    Take 1 capsule (300 mg) by mouth 2 times daily    Left ankle injury, initial encounter, Chronic pain of left knee       montelukast 10 MG tablet    SINGULAIR    90 tablet    TAKE 1 TABLET DAILY    Mild persistent asthma without complication, Intermittent asthma, with acute exacerbation       order for DME     1 Units    cpap machine and needed tubing and accessories    SUSHIL (obstructive sleep apnea)       order for DME     1 Units    Nebulizer machine Qty #1    Asthma exacerbation, moderate persistent       order for DME     1 Device    Ankle brace    Bilateral foot pain, Posterior tibial tendon dysfunction, left, Pes planus of both feet, Posterior tibial tendonitis, right, Venous insufficiency of both lower extremities       scopolamine 72 hr patch    TRANSDERM-SCOP (1.5 MG)    5 patch    Place 1 patch onto the skin every 72 hours Apply to hairless area behind one ear at least 4 hours before travel.    Other specified counseling       SUMAtriptan 100 MG tablet    IMITREX    27 tablet    TAKE AS INSTRUCTED BY YOUR PRESCRIBER    Migraine with aura       verapamil 240 MG CR tablet    CALAN-SR    90 tablet    TAKE 1 TABLET AT BEDTIME    Migraine with aura       * Notice:  This list has 2 medication(s) that are the same as  other medications prescribed for you. Read the directions carefully, and ask your doctor or other care provider to review them with you.

## 2018-03-23 ASSESSMENT — ASTHMA QUESTIONNAIRES: ACT_TOTALSCORE: 20

## 2018-04-09 ENCOUNTER — RADIANT APPOINTMENT (OUTPATIENT)
Dept: GENERAL RADIOLOGY | Facility: CLINIC | Age: 63
End: 2018-04-09
Attending: ORTHOPAEDIC SURGERY
Payer: COMMERCIAL

## 2018-04-09 ENCOUNTER — OFFICE VISIT (OUTPATIENT)
Dept: ORTHOPEDICS | Facility: CLINIC | Age: 63
End: 2018-04-09
Payer: COMMERCIAL

## 2018-04-09 VITALS
DIASTOLIC BLOOD PRESSURE: 86 MMHG | SYSTOLIC BLOOD PRESSURE: 128 MMHG | BODY MASS INDEX: 32.95 KG/M2 | HEIGHT: 66 IN | WEIGHT: 205 LBS

## 2018-04-09 DIAGNOSIS — G89.29 CHRONIC PAIN OF LEFT ANKLE: Primary | ICD-10-CM

## 2018-04-09 DIAGNOSIS — G89.29 CHRONIC PAIN OF LEFT ANKLE: ICD-10-CM

## 2018-04-09 DIAGNOSIS — M25.572 CHRONIC PAIN OF LEFT ANKLE: ICD-10-CM

## 2018-04-09 DIAGNOSIS — M19.072 OSTEOARTHRITIS OF LEFT ANKLE, UNSPECIFIED OSTEOARTHRITIS TYPE: ICD-10-CM

## 2018-04-09 DIAGNOSIS — M25.572 CHRONIC PAIN OF LEFT ANKLE: Primary | ICD-10-CM

## 2018-04-09 PROCEDURE — 99214 OFFICE O/P EST MOD 30 MIN: CPT | Performed by: ORTHOPAEDIC SURGERY

## 2018-04-09 PROCEDURE — 73610 X-RAY EXAM OF ANKLE: CPT | Mod: LT | Performed by: ORTHOPAEDIC SURGERY

## 2018-04-09 NOTE — PATIENT INSTRUCTIONS
[place] fracture boot as needed  Over-the-counter medications as needed  Follow-up as needed    Patient to follow up with Primary Care provider regarding elevated blood pressure.

## 2018-04-09 NOTE — PROGRESS NOTES
HISTORY OF PRESENT ILLNESS:    Tasneem Chan is a 62 year old female who is seen in consultation at the request of Dr. Villanueva for chronic left ankle and foot pain.  Present symptoms: Pain initially started after falling and inverting left ankle ~ 2 years ago.  Pain located over lateral ankle/midfoot and medial heel.  Notes intermittent swelling with deep aching sensation, mostly with prolonged standing/walking > 40 minutes.  Orthotics provide mild relief.  She is mainly interested in finding out if there is an alternative to cam walker since compression ankle support is not enough to prevent pain from coming when she does a lot of walking.  She is think about taking a long trip that requires a lot of walking in the near future.  Treatments tried to this point: MRI completed in 2016, custom orthotics, CAM boot, pain medications - Gabapentin, consultation with podiatry (Dr Belle)  Orthopedic PMH: Knee arthritis.  Her symptoms for knee arthritis have been steady.  She has been doing exercise biking which has been helpful.    Past Medical History:   Diagnosis Date     Irritable bowel syndrome      Malignant neoplasm of breast (female), unspecified site     radiation, tamoxifen, lumpectomy - rt breast     Migraine with aura, without mention of intractable migraine without mention of status migrainosus      Mild persistent asthma     mild - humidity and infection triggers     Personal history of DVT (deep vein thrombosis)     x2 - never had previous testing       Past Surgical History:   Procedure Laterality Date     BREAST LUMPECTOMY, RT/LT      RT      SECTION       SURGICAL HISTORY OF -       Rt Knee spur removal       Family History   Problem Relation Age of Onset     Cardiovascular Mother      rheumatic fever, irregular rhythm     Pulmonary Embolism Father 61     2 weeks after hernia surgery     HEART DISEASE Sister      heart block       Social History     Social History      Marital status:      Spouse name: eder     Number of children: 3     Years of education: 14     Occupational History      None      Social History Main Topics     Smoking status: Never Smoker     Smokeless tobacco: Never Used     Alcohol use No     Drug use: No     Sexual activity: Yes     Partners: Male      Comment: postmenopausal      Other Topics Concern     Caffeine Concern Yes     2 cups daily     Exercise Yes     walks     Seat Belt Yes     Parent/Sibling W/ Cabg, Mi Or Angioplasty Before 65f 55m? Yes     Mother and sister     Social History Narrative       Current Outpatient Prescriptions   Medication Sig Dispense Refill     SUMAtriptan (IMITREX) 100 MG tablet TAKE AS INSTRUCTED BY YOUR PRESCRIBER 27 tablet 1     scopolamine (TRANSDERM-SCOP, 1.5 MG,) 72 hr patch Place 1 patch onto the skin every 72 hours Apply to hairless area behind one ear at least 4 hours before travel. 5 patch 1     gabapentin (NEURONTIN) 300 MG capsule Take 1 capsule (300 mg) by mouth 2 times daily 180 capsule 1     order for DME Ankle brace 1 Device 0     fluticasone-salmeterol (ADVAIR DISKUS) 250-50 MCG/DOSE diskus inhaler Inhale 1 puff into the lungs every 12 hours During asthma exacerbations 1 Inhaler 11     albuterol (2.5 MG/3ML) 0.083% neb solution Take 1 vial (2.5 mg) by nebulization every 6 hours as needed for shortness of breath / dyspnea or wheezing 25 vial 11     albuterol (PROAIR HFA) 108 (90 BASE) MCG/ACT Inhaler USE 1 TO 2 INHALATIONS EVERY 4 TO 6 HOURS AS NEEDED 25.5 g 11     cyclobenzaprine (FLEXERIL) 10 MG tablet 1/2 tab during the day and 1 tab at bedtime as needed for muscle spasms 20 tablet 0     montelukast (SINGULAIR) 10 MG tablet TAKE 1 TABLET DAILY 90 tablet 3     verapamil (CALAN-SR) 240 MG CR tablet TAKE 1 TABLET AT BEDTIME 90 tablet 3     fluticasone (FLONASE) 50 MCG/ACT spray USE 1 TO 2 SPRAYS IN EACH NOSTRIL DAILY 16 g 1     order for DME Nebulizer machine Qty #1 1 Units 1     ORDER FOR DME cpap  machine and needed tubing and accessories 1 Units 1     cetirizine (ZYRTEC) 10 MG tablet Take 1 tablet by mouth daily. 30 tablet 11       Allergies   Allergen Reactions     Levaquin [Levofloxacin Hemihydrate]      diarrhea       REVIEW OF SYSTEMS:  CONSTITUTIONAL:  NEGATIVE for fever, chills, change in weight  INTEGUMENTARY/SKIN:  NEGATIVE for worrisome rashes, moles or lesions  EYES:  NEGATIVE for vision changes or irritation  ENT/MOUTH:  NEGATIVE for ear, mouth and throat problems  RESP:  NEGATIVE for significant cough or SOB  BREAST:  NEGATIVE for masses, tenderness or discharge  CV:  NEGATIVE for chest pain, palpitations or peripheral edema  GI:  NEGATIVE for nausea, abdominal pain, heartburn, or change in bowel habits  :  Negative   MUSCULOSKELETAL:  See HPI above  NEURO:  NEGATIVE for weakness, dizziness or paresthesias  ENDOCRINE:  NEGATIVE for temperature intolerance, skin/hair changes  HEME/ALLERGY/IMMUNE:  NEGATIVE for bleeding problems  PSYCHIATRIC:  NEGATIVE for changes in mood or affect      PHYSICAL EXAM:  There were no vitals taken for this visit.  There is no height or weight on file to calculate BMI.   GENERAL APPEARANCE: healthy, alert and no distress   SKIN: no suspicious lesions or rashes  NEURO: Normal strength and tone, mentation intact and speech normal  VASCULAR: Good pulses, and capillary refill   LYMPH: no lymphadenopathy   PSYCH:  mentation appears normal and affect normal/bright    MSK:  She is able to walk fairly well without significant limp  No gross deformity  Functional flatfoot deformity, left  Ankle range of motion is well-maintained  Subtalar range of motion is also well maintained, left  No significant swelling in the left ankle  Motor function is full  Most of the tenderness is at the talonavicular joint  Distal neurovascular status is intact     ASSESSMENT:  Chronic left ankle DJD, stable  Further worsening of the talonavicular DJD, left  Further collapse of the flatfoot  deformity    PLAN:  We visualized the x-rays of the ankle and compared them to the ones from 2 years ago.  Ankle joint itself is fairly stable in terms of arthritic changes.  However talonavicular joint has progressed further in terms of the collapse of the talus in relation to the navicular and degenerative changes.  As she indicated, she is not interested in looking for any surgical intervention at this time.  In line with what she is looking for, we had her try a shorter version of a fracture boot.  She seemed to find it more convenient in terms of the bulkiness and height difference between the left and right foot when she wears the boot.  Otherwise continuing effort and weight loss is recommended.  Follow-up as needed      Imaging Interpretation:   3 views of the left ankle demonstrate well-maintained mild degenerative changes of the ankle joint.  Further collapse of the talus and relations to the navicular on the lateral view is noted.  Degenerative changes of the talonavicular joint has progressed slightly compared to the previous x-rays of September 12, 2016.  Otherwise, no acute fractures or other bone pathology are noted.      Esequiel Edward MD  Department of Orthopedic Surgery        Disclaimer: This note consists of symbols derived from keyboarding, dictation and/or voice recognition software. As a result, there may be errors in the script that have gone undetected. Please consider this when interpreting information found in this chart.

## 2018-04-09 NOTE — LETTER
2018         RE: Tasneem Chan  [id number]  [facility] [address]        Dear Colleague,    Thank you for referring your patient, Tasneem Chan, to the [facility] ORTHOPEDIC SURGERY. Please see a copy of my visit note below.    HISTORY OF PRESENT ILLNESS:    Tasneem Chan is a 62 year old female who is seen in consultation at the request of Dr. Villanueva for chronic left ankle and foot pain.  Present symptoms: Pain initially started after falling and inverting left ankle ~ 2 years ago.  Pain located over lateral ankle/midfoot and medial heel.  Notes intermittent swelling with deep aching sensation, mostly with prolonged standing/walking > 40 minutes.  Orthotics provide mild relief.  She is mainly interested in finding out if there is an alternative to cam walker since compression ankle support is not enough to prevent pain from coming when she does a lot of walking.  She is think about taking a long trip that requires a lot of walking in the near future.  Treatments tried to this point: MRI completed in 2016, custom orthotics, CAM boot, pain medications - Gabapentin, consultation with podiatry (Dr Belle)  Orthopedic PMH: Knee arthritis.  Her symptoms for knee arthritis have been steady.  She has been doing exercise biking which has been helpful.    Past Medical History:   Diagnosis Date     Irritable bowel syndrome      Malignant neoplasm of breast (female), unspecified site     radiation, tamoxifen, lumpectomy - rt breast     Migraine with aura, without mention of intractable migraine without mention of status migrainosus      Mild persistent asthma     mild - humidity and infection triggers     Personal history of DVT (deep vein thrombosis)     x2 - never had previous testing       Past Surgical History:   Procedure Laterality Date     BREAST LUMPECTOMY, RT/LT      RT      SECTION       SURGICAL HISTORY OF -       Rt Knee spur removal        Family History   Problem Relation Age of Onset     Cardiovascular Mother      rheumatic fever, irregular rhythm     Pulmonary Embolism Father 61     2 weeks after hernia surgery     HEART DISEASE Sister      heart block       Social History     Social History     Marital status:      Spouse name: eder     Number of children: 3     Years of education: 14     Occupational History      None      Social History Main Topics     Smoking status: Never Smoker     Smokeless tobacco: Never Used     Alcohol use No     Drug use: No     Sexual activity: Yes     Partners: Male      Comment: postmenopausal      Other Topics Concern     Caffeine Concern Yes     2 cups daily     Exercise Yes     walks     Seat Belt Yes     Parent/Sibling W/ Cabg, Mi Or Angioplasty Before 65f 55m? Yes     Mother and sister     Social History Narrative       Current Outpatient Prescriptions   Medication Sig Dispense Refill     SUMAtriptan (IMITREX) 100 MG tablet TAKE AS INSTRUCTED BY YOUR PRESCRIBER 27 tablet 1     scopolamine (TRANSDERM-SCOP, 1.5 MG,) 72 hr patch Place 1 patch onto the skin every 72 hours Apply to hairless area behind one ear at least 4 hours before travel. 5 patch 1     gabapentin (NEURONTIN) 300 MG capsule Take 1 capsule (300 mg) by mouth 2 times daily 180 capsule 1     order for DME Ankle brace 1 Device 0     fluticasone-salmeterol (ADVAIR DISKUS) 250-50 MCG/DOSE diskus inhaler Inhale 1 puff into the lungs every 12 hours During asthma exacerbations 1 Inhaler 11     albuterol (2.5 MG/3ML) 0.083% neb solution Take 1 vial (2.5 mg) by nebulization every 6 hours as needed for shortness of breath / dyspnea or wheezing 25 vial 11     albuterol (PROAIR HFA) 108 (90 BASE) MCG/ACT Inhaler USE 1 TO 2 INHALATIONS EVERY 4 TO 6 HOURS AS NEEDED 25.5 g 11     cyclobenzaprine (FLEXERIL) 10 MG tablet 1/2 tab during the day and 1 tab at bedtime as needed for muscle spasms 20 tablet 0     montelukast (SINGULAIR) 10 MG tablet TAKE 1  TABLET DAILY 90 tablet 3     verapamil (CALAN-SR) 240 MG CR tablet TAKE 1 TABLET AT BEDTIME 90 tablet 3     fluticasone (FLONASE) 50 MCG/ACT spray USE 1 TO 2 SPRAYS IN EACH NOSTRIL DAILY 16 g 1     order for DME Nebulizer machine Qty #1 1 Units 1     ORDER FOR DME cpap machine and needed tubing and accessories 1 Units 1     cetirizine (ZYRTEC) 10 MG tablet Take 1 tablet by mouth daily. 30 tablet 11       Allergies   Allergen Reactions     Levaquin [Levofloxacin Hemihydrate]      diarrhea       REVIEW OF SYSTEMS:  CONSTITUTIONAL:  NEGATIVE for fever, chills, change in weight  INTEGUMENTARY/SKIN:  NEGATIVE for worrisome rashes, moles or lesions  EYES:  NEGATIVE for vision changes or irritation  ENT/MOUTH:  NEGATIVE for ear, mouth and throat problems  RESP:  NEGATIVE for significant cough or SOB  BREAST:  NEGATIVE for masses, tenderness or discharge  CV:  NEGATIVE for chest pain, palpitations or peripheral edema  GI:  NEGATIVE for nausea, abdominal pain, heartburn, or change in bowel habits  :  Negative   MUSCULOSKELETAL:  See HPI above  NEURO:  NEGATIVE for weakness, dizziness or paresthesias  ENDOCRINE:  NEGATIVE for temperature intolerance, skin/hair changes  HEME/ALLERGY/IMMUNE:  NEGATIVE for bleeding problems  PSYCHIATRIC:  NEGATIVE for changes in mood or affect      PHYSICAL EXAM:  There were no vitals taken for this visit.  There is no height or weight on file to calculate BMI.   GENERAL APPEARANCE: healthy, alert and no distress   SKIN: no suspicious lesions or rashes  NEURO: Normal strength and tone, mentation intact and speech normal  VASCULAR: Good pulses, and capillary refill   LYMPH: no lymphadenopathy   PSYCH:  mentation appears normal and affect normal/bright    MSK:  She is able to walk fairly well without significant limp  No gross deformity  Functional flatfoot deformity, left  Ankle range of motion is well-maintained  Subtalar range of motion is also well maintained, left  No significant swelling  in the left ankle  Motor function is full  Most of the tenderness is at the talonavicular joint  Distal neurovascular status is intact     ASSESSMENT:  Chronic left ankle DJD, stable  Further worsening of the talonavicular DJD, left  Further collapse of the flatfoot deformity    PLAN:  We visualized the x-rays of the ankle and compared them to the ones from 2 years ago.  Ankle joint itself is fairly stable in terms of arthritic changes.  However talonavicular joint has progressed further in terms of the collapse of the talus in relation to the navicular and degenerative changes.  As she indicated, she is not interested in looking for any surgical intervention at this time.  In line with what she is looking for, we had her try a shorter version of a fracture boot.  She seemed to find it more convenient in terms of the bulkiness and height difference between the left and right foot when she wears the boot.  Otherwise continuing effort and weight loss is recommended.  Follow-up as needed      Imaging Interpretation:   3 views of the left ankle demonstrate well-maintained mild degenerative changes of the ankle joint.  Further collapse of the talus and relations to the navicular on the lateral view is noted.  Degenerative changes of the talonavicular joint has progressed slightly compared to the previous x-rays of September 12, 2016.  Otherwise, no acute fractures or other bone pathology are noted.      Esequiel Edward MD  Department of Orthopedic Surgery        Disclaimer: This note consists of symbols derived from keyboarding, dictation and/or voice recognition software. As a result, there may be errors in the script that have gone undetected. Please consider this when interpreting information found in this chart.      Again, thank you for allowing me to participate in the care of your patient.        Sincerely,        Esequiel Edward MD

## 2018-04-09 NOTE — MR AVS SNAPSHOT
After Visit Summary   4/9/2018    Tasneem Chan    MRN: [id number]           Patient Information     Date Of Birth          1955        Visit Information        Provider Department      4/9/2018 1:20 PM Esequiel Edward MD [facility] ORTHOPEDIC SURGERY        Today's Diagnoses     Chronic pain of left ankle    -  1    Osteoarthritis of left ankle, unspecified osteoarthritis type          Care Instructions    Dell fracture boot as needed  Over-the-counter medications as needed  Follow-up as needed            Follow-ups after your visit        Your next 10 appointments already scheduled     Jun 04, 2018  9:00 AM CDT   [facility] with [facility]    [address] [place] [id number] [phone]              Who to contact     If you have questions or need follow up information about today's clinic visit or your schedule please contact [facility] ORTHOPEDIC SURGERY directly at [phone].  Normal or non-critical lab and imaging results will be communicated to you by Baloonrhart, letter or phone within 4 business days after the clinic has received the results. If you do not hear from us within 7 days, please contact the clinic through Hirit or phone. If you have a critical or abnormal lab result, we will notify you by phone as soon as possible.  Submit refill requests through [employer] or call your pharmacy and they will forward the refill request to us. Please allow 3 business days for your refill to be completed.          Additional Information About Your Visit        Baloonrhart Information     [employer] gives you secure access to your electronic health record. If you see a primary care provider, you can also send messages to your care team and make appointments. If you have questions, please call your primary care clinic.  If you do not have a primary care provider, please call [phone]  "and they will assist you.        Care EveryWhere ID     This is your Care EveryWhere ID. This could be used by other organizations to access your [facility] medical records  [id number]        Your Vitals Were     Height BMI (Body Mass Index)                5' 6\" (1.676 m) 33.09 kg/m2           Blood Pressure from Last 3 Encounters:   04/09/18 128/86   03/22/18 118/78   03/15/18 98/68    Weight from Last 3 Encounters:   04/09/18 205 lb (93 kg)   03/22/18 206 lb (93.4 kg)   10/16/17 209 lb (94.8 kg)               Primary Care Provider Office Phone # Fax #    Zayra Villanueva [phone]       [facility] [address]        Equal Access to Services     ODILON ROSAS : Hadii aad ku hadasho Sotru, waaxda luqadaha, qaybta kaalmada kirit, dustin neves . So [facility] [phone].    ATENCIÓN: Si habla español, tiene a curtis disposición servicios gratuitos de asistencia lingüística. Rachael al [phone].    We comply with applicable federal civil rights laws and [place] laws. We do not discriminate on the basis of race, color, national origin, age, disability, sex, sexual orientation, or gender identity.            Thank you!     Thank you for choosing [facility] ORTHOPEDIC SURGERY  for your care. Our goal is always to provide you with excellent care. Hearing back from our patients is one way we can continue to improve our services. Please take a few minutes to complete the written survey that you may receive in the mail after your visit with us. Thank you!             Your Updated Medication List - Protect others around you: Learn how to safely use, store and throw away your medicines at www.disposemymeds.org.          This list is accurate as of 4/9/18  2:17 PM.  Always use your most recent med list.                   Brand Name Dispense Instructions for use Diagnosis    * albuterol (2.5 MG/3ML) 0.083% neb solution     25 vial    Take 1 vial (2.5 " mg) by nebulization every 6 hours as needed for shortness of breath / dyspnea or wheezing    Mild intermittent asthma with acute exacerbation       * albuterol 108 (90 BASE) MCG/ACT Inhaler    PROAIR HFA    25.5 g    USE 1 TO 2 INHALATIONS EVERY 4 TO 6 HOURS AS NEEDED    Mild intermittent asthma with acute exacerbation       cetirizine 10 MG tablet    ZYRTEC    30 tablet    Take 1 tablet by mouth daily.    Rash       cyclobenzaprine 10 MG tablet    FLEXERIL    20 tablet    1/2 tab during the day and 1 tab at bedtime as needed for muscle spasms    Acute midline thoracic back pain       fluticasone 50 MCG/ACT spray    FLONASE    16 g    USE 1 TO 2 SPRAYS IN EACH NOSTRIL DAILY    Allergic rhinitis       fluticasone-salmeterol 250-50 MCG/DOSE diskus inhaler    ADVAIR DISKUS    1 Inhaler    Inhale 1 puff into the lungs every 12 hours During asthma exacerbations    Mild intermittent asthma with acute exacerbation       gabapentin 300 MG capsule    NEURONTIN    180 capsule    Take 1 capsule (300 mg) by mouth 2 times daily    Left ankle injury, initial encounter, Chronic pain of left knee       montelukast 10 MG tablet    SINGULAIR    90 tablet    TAKE 1 TABLET DAILY    Mild persistent asthma without complication, Intermittent asthma, with acute exacerbation       order for DME     1 Units    cpap machine and needed tubing and accessories    SUSHIL (obstructive sleep apnea)       order for DME     1 Units    Nebulizer machine Qty #1    Asthma exacerbation, moderate persistent       order for DME     1 Device    Ankle brace    Bilateral foot pain, Posterior tibial tendon dysfunction, left, Pes planus of both feet, Posterior tibial tendonitis, right, Venous insufficiency of both lower extremities       scopolamine 72 hr patch    TRANSDERM-SCOP (1.5 MG)    5 patch    Place 1 patch onto the skin every 72 hours Apply to hairless area behind one ear at least 4 hours before travel.    Other specified counseling       SUMAtriptan 100  MG tablet    IMITREX    27 tablet    TAKE AS INSTRUCTED BY YOUR PRESCRIBER    Migraine with aura       verapamil 240 MG CR tablet    CALAN-SR    90 tablet    TAKE 1 TABLET AT BEDTIME    Migraine with aura       * Notice:  This list has 2 medication(s) that are the same as other medications prescribed for you. Read the directions carefully, and ask your doctor or other care provider to review them with you.

## 2018-04-25 ENCOUNTER — OFFICE VISIT (OUTPATIENT)
Dept: FAMILY MEDICINE | Facility: CLINIC | Age: 63
End: 2018-04-25
Payer: COMMERCIAL

## 2018-04-25 VITALS
HEIGHT: 66 IN | BODY MASS INDEX: 33.17 KG/M2 | WEIGHT: 206.38 LBS | DIASTOLIC BLOOD PRESSURE: 84 MMHG | SYSTOLIC BLOOD PRESSURE: 126 MMHG | TEMPERATURE: 97.7 F | OXYGEN SATURATION: 98 %

## 2018-04-25 DIAGNOSIS — J30.9 ALLERGIC RHINITIS, UNSPECIFIED CHRONICITY, UNSPECIFIED SEASONALITY, UNSPECIFIED TRIGGER: ICD-10-CM

## 2018-04-25 DIAGNOSIS — M54.6 ACUTE MIDLINE THORACIC BACK PAIN: ICD-10-CM

## 2018-04-25 DIAGNOSIS — H65.91 OME (OTITIS MEDIA WITH EFFUSION), RIGHT: Primary | ICD-10-CM

## 2018-04-25 PROCEDURE — 99213 OFFICE O/P EST LOW 20 MIN: CPT | Performed by: PHYSICIAN ASSISTANT

## 2018-04-25 RX ORDER — CYCLOBENZAPRINE HCL 10 MG
TABLET ORAL
Qty: 20 TABLET | Refills: 0 | Status: SHIPPED | OUTPATIENT
Start: 2018-04-25

## 2018-04-25 RX ORDER — CEFDINIR 300 MG/1
300 CAPSULE ORAL 2 TIMES DAILY
Qty: 20 CAPSULE | Refills: 0 | Status: SHIPPED | OUTPATIENT
Start: 2018-04-25 | End: 2018-05-05

## 2018-04-25 RX ORDER — FLUTICASONE PROPIONATE 50 MCG
SPRAY, SUSPENSION (ML) NASAL
Qty: 16 G | Refills: 1 | Status: SHIPPED | OUTPATIENT
Start: 2018-04-25

## 2018-04-25 NOTE — NURSING NOTE
"Chief Complaint   Patient presents with     Ear Problem     right ear pain ~1 week        Initial /84  Temp 97.7  F (36.5  C) (Tympanic)  Ht 5' 6\" (1.676 m)  Wt 206 lb 6 oz (93.6 kg)  SpO2 98%  BMI 33.31 kg/m2 Estimated body mass index is 33.31 kg/(m^2) as calculated from the following:    Height as of this encounter: 5' 6\" (1.676 m).    Weight as of this encounter: 206 lb 6 oz (93.6 kg).  Medication Reconciliation: complete    "

## 2018-04-25 NOTE — MR AVS SNAPSHOT
"              After Visit Summary   4/25/2018    Tasneem Chan    MRN: [id number]           Patient Information     Date Of Birth          1955        Visit Information        Provider Department      4/25/2018 11:00 AM Keisha Duong PA-C [facility]        Today's Diagnoses     OME (otitis media with effusion), right    -  1    Allergic rhinitis, unspecified chronicity, unspecified seasonality, unspecified trigger        Acute midline thoracic back pain           Follow-ups after your visit        Your next 10 appointments already scheduled     Jun 04, 2018  9:00 AM CDT   PHYSICAL with Zayra Villanueva MD   [facility] ([facility])    [address] [place] [id number] [phone]              Who to contact     If you have questions or need follow up information about today's clinic visit or your schedule please contact [facility] directly at [phone].  Normal or non-critical lab and imaging results will be communicated to you by MyChart, letter or phone within 4 business days after the clinic has received the results. If you do not hear from us within 7 days, please contact the clinic through Givithart or phone. If you have a critical or abnormal lab result, we will notify you by phone as soon as possible.  Submit refill requests through UserApp or call your pharmacy and they will forward the refill request to us. Please allow 3 business days for your refill to be completed.          Additional Information About Your Visit        Givithart Information     UserApp lets you send messages to your doctor, view your test results, renew your prescriptions, schedule appointments and more. To sign up, go to [email or web address] . Click on \"Log in\" on the left side of the screen, which will take you to the Welcome page. Then click on \"Sign up Now\" on the right side of the page.     You will be asked to " "enter the access code listed below, as well as some personal information. Please follow the directions to create your username and password.     Your access code is: [email or web address]  Expires: 2018 11:17 AM     Your access code will  in 90 days. If you need help or a new code, please call your [place] clinic or [phone].        Care EveryWhere ID     This is your Care EveryWhere ID. This could be used by other organizations to access your [place] medical records  [id number]        Your Vitals Were     Temperature Height Pulse Oximetry BMI (Body Mass Index)          97.7  F (36.5  C) (Tympanic) 5' 6\" (1.676 m) 98% 33.31 kg/m2         Blood Pressure from Last 3 Encounters:   18 126/84   18 128/86   18 118/78    Weight from Last 3 Encounters:   18 206 lb 6 oz (93.6 kg)   18 205 lb (93 kg)   18 206 lb (93.4 kg)              Today, you had the following     No orders found for display         Today's Medication Changes          These changes are accurate as of 18 11:17 AM.  If you have any questions, ask your nurse or doctor.               Start taking these medicines.        Dose/Directions    cefdinir 300 MG capsule   Commonly known as:  OMNICEF   Used for:  OME (otitis media with effusion), right   Started by:  Keisha Duong PA-C        Dose:  300 mg   Take 1 capsule (300 mg) by mouth 2 times daily for 10 days   Quantity:  20 capsule   Refills:  0         These medicines have changed or have updated prescriptions.        Dose/Directions    fluticasone 50 MCG/ACT spray   Commonly known as:  FLONASE   This may have changed:  See the new instructions.   Used for:  Allergic rhinitis, unspecified chronicity, unspecified seasonality, unspecified trigger   Changed by:  Keisha Duong PA-C        USE 1 TO 2 SPRAYS IN EACH NOSTRIL DAILY   Quantity:  16 g   Refills:  1            Where to get your medicines      These medications were sent to [facility] " [facility] - [place], [place] - [address]  [address], [facility] [address]     Phone:  [phone]     cefdinir 300 MG capsule    cyclobenzaprine 10 MG tablet    fluticasone 50 MCG/ACT spray                Primary Care Provider Office Phone # Fax #    Zayranargis Villanueva [phone]       [address] [place] [id number]        Equal Access to Services     ODILON ROSAS : Hadii aad ku hadasho Soomaali, waaxda luqadaha, qaybta kaalmada adeegyada, waxay idiin hayaan adeeg kharash la'martir . So [facility] [phone].    ATENCIÓN: Si habla español, tiene a curtis disposición servicios gratuitos de asistencia lingüística. [facility] [phone].    We comply with applicable federal civil rights laws and [place] laws. We do not discriminate on the basis of race, color, national origin, age, disability, sex, sexual orientation, or gender identity.            Thank you!     Thank you for choosing [facility]  for your care. Our goal is always to provide you with excellent care. Hearing back from our patients is one way we can continue to improve our services. Please take a few minutes to complete the written survey that you may receive in the mail after your visit with us. Thank you!             Your Updated Medication List - Protect others around you: Learn how to safely use, store and throw away your medicines at www.disposemymeds.org.          This list is accurate as of 4/25/18 11:17 AM.  Always use your most recent med list.                   Brand Name Dispense Instructions for use Diagnosis    * albuterol (2.5 MG/3ML) 0.083% neb solution     25 vial    Take 1 vial (2.5 mg) by nebulization every 6 hours as needed for shortness of breath / dyspnea or wheezing    Mild intermittent asthma with acute exacerbation       * albuterol 108 (90 Base) MCG/ACT Inhaler    PROAIR HFA    25.5 g    USE 1 TO 2 INHALATIONS EVERY 4 TO 6 HOURS AS NEEDED    Mild  intermittent asthma with acute exacerbation       cefdinir 300 MG capsule    OMNICEF    20 capsule    Take 1 capsule (300 mg) by mouth 2 times daily for 10 days    OME (otitis media with effusion), right       cetirizine 10 MG tablet    ZYRTEC    30 tablet    Take 1 tablet by mouth daily.    Rash       cyclobenzaprine 10 MG tablet    FLEXERIL    20 tablet    1/2 tab during the day and 1 tab at bedtime as needed for muscle spasms    Acute midline thoracic back pain       fluticasone 50 MCG/ACT spray    FLONASE    16 g    USE 1 TO 2 SPRAYS IN EACH NOSTRIL DAILY    Allergic rhinitis, unspecified chronicity, unspecified seasonality, unspecified trigger       fluticasone-salmeterol 250-50 MCG/DOSE diskus inhaler    ADVAIR DISKUS    1 Inhaler    Inhale 1 puff into the lungs every 12 hours During asthma exacerbations    Mild intermittent asthma with acute exacerbation       gabapentin 300 MG capsule    NEURONTIN    180 capsule    Take 1 capsule (300 mg) by mouth 2 times daily    Left ankle injury, initial encounter, Chronic pain of left knee       montelukast 10 MG tablet    SINGULAIR    90 tablet    TAKE 1 TABLET DAILY    Mild persistent asthma without complication, Intermittent asthma, with acute exacerbation       order for DME     1 Units    cpap machine and needed tubing and accessories    SUSHIL (obstructive sleep apnea)       order for DME     1 Units    Nebulizer machine Qty #1    Asthma exacerbation, moderate persistent       order for DME     1 Device    Ankle brace    Bilateral foot pain, Posterior tibial tendon dysfunction, left, Pes planus of both feet, Posterior tibial tendonitis, right, Venous insufficiency of both lower extremities       scopolamine 72 hr patch    TRANSDERM-SCOP (1.5 MG)    5 patch    Place 1 patch onto the skin every 72 hours Apply to hairless area behind one ear at least 4 hours before travel.    Other specified counseling       SUMAtriptan 100 MG tablet    IMITREX    27 tablet    TAKE AS  INSTRUCTED BY YOUR PRESCRIBER    Migraine with aura       verapamil 240 MG CR tablet    CALAN-SR    90 tablet    TAKE 1 TABLET AT BEDTIME    Migraine with aura       * Notice:  This list has 2 medication(s) that are the same as other medications prescribed for you. Read the directions carefully, and ask your doctor or other care provider to review them with you.

## 2018-04-25 NOTE — PROGRESS NOTES
SUBJECTIVE:   Tasneem Chan is a 62 year old female who presents to clinic today for the following health issues:    Otalgia  Tasneem presents to clinic reporting symptoms of right sided ear pain that onset 1 week ago. She states that when her symptoms onset they were more intermittent and only felt like fluid but have now become painful and persistent. Her ears feel like they need to pop. She has been using fluticasone, Singulair, and Zyrtec to treat her symptoms without relief. She denies nasal and postnasal drainage, cough, left ear pain, and fevers.    Problem list and histories reviewed & adjusted, as indicated.  Additional history: as documented    Patient Active Problem List   Diagnosis     Malignant neoplasm of female breast (H)- in  - right breast -s/p lumpectomy with radiation, tamoxifen     Intractable chronic migraine without aura and without status migrainosus     Irritable bowel syndrome     GERD (GASTROESOPHAGEAL REFLUX DISEASE)- much improved with diet     Intermittent asthma     Mild persistent asthma     Hearing loss, sensorineural, high frequency, right- from right 8th cranial nerve tumor (meningioma)     Left ankle injury, initial encounter     Left knee pain     Advanced directives, counseling/discussion     Benign neoplasm of cranial nerve (H)     Blood pressure elevated without history of HTN     New daily persistent headache     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 distance      Past Surgical History:   Procedure Laterality Date     BREAST LUMPECTOMY, RT/LT      RT     C EXCIS INFRATENT MENINGIOMA      meningioma excised from 8th cranial nerve right side      SECTION       CRANIOTOMY, EXCISE TUMOR COMPLEX, COMBINED  2014    CraniotomyNotes: Suboccipital Tumor resection - Meningioma     SURGICAL HISTORY OF -       Rt Knee spur removal       Social  History   Substance Use Topics     Smoking status: Never Smoker     Smokeless tobacco: Never Used     Alcohol use No     Family History   Problem Relation Age of Onset     Cardiovascular Mother      rheumatic fever, irregular rhythm     Pulmonary Embolism Father 61     2 weeks after hernia surgery     HEART DISEASE Sister      heart block         Current Outpatient Prescriptions   Medication Sig Dispense Refill     albuterol (2.5 MG/3ML) 0.083% neb solution Take 1 vial (2.5 mg) by nebulization every 6 hours as needed for shortness of breath / dyspnea or wheezing 25 vial 11     albuterol (PROAIR HFA) 108 (90 BASE) MCG/ACT Inhaler USE 1 TO 2 INHALATIONS EVERY 4 TO 6 HOURS AS NEEDED 25.5 g 11     cefdinir (OMNICEF) 300 MG capsule Take 1 capsule (300 mg) by mouth 2 times daily for 10 days 20 capsule 0     cetirizine (ZYRTEC) 10 MG tablet Take 1 tablet by mouth daily. 30 tablet 11     cyclobenzaprine (FLEXERIL) 10 MG tablet 1/2 tab during the day and 1 tab at bedtime as needed for muscle spasms 20 tablet 0     fluticasone (FLONASE) 50 MCG/ACT spray USE 1 TO 2 SPRAYS IN EACH NOSTRIL DAILY 16 g 1     fluticasone-salmeterol (ADVAIR DISKUS) 250-50 MCG/DOSE diskus inhaler Inhale 1 puff into the lungs every 12 hours During asthma exacerbations 1 Inhaler 11     gabapentin (NEURONTIN) 300 MG capsule Take 1 capsule (300 mg) by mouth 2 times daily 180 capsule 1     montelukast (SINGULAIR) 10 MG tablet TAKE 1 TABLET DAILY 90 tablet 3     ORDER FOR DME cpap machine and needed tubing and accessories 1 Units 1     order for DME Nebulizer machine Qty #1 1 Units 1     order for DME Ankle brace 1 Device 0     scopolamine (TRANSDERM-SCOP, 1.5 MG,) 72 hr patch Place 1 patch onto the skin every 72 hours Apply to hairless area behind one ear at least 4 hours before travel. 5 patch 1     SUMAtriptan (IMITREX) 100 MG tablet TAKE AS INSTRUCTED BY YOUR PRESCRIBER 27 tablet 1     verapamil (CALAN-SR) 240 MG CR tablet TAKE 1 TABLET AT BEDTIME 90  "tablet 3     Allergies   Allergen Reactions     Levaquin [Levofloxacin Hemihydrate]      diarrhea       Reviewed and updated as needed this visit by clinical staff  Tobacco  Allergies  Meds  Problems  Med Hx  Surg Hx  Fam Hx  Soc Hx        Reviewed and updated as needed this visit by Provider  Tobacco  Allergies  Meds  Problems  Med Hx  Surg Hx  Fam Hx  Soc Hx          ROS:  Constitutional, HEENT, cardiovascular, pulmonary, GI, , musculoskeletal, neuro, skin, endocrine and psych systems are negative, except as otherwise noted.    This document serves as a record of the services and decisions personally performed and made by Keisha Duong PA-C. It was created on her behalf by Carlos Hilliard, a trained medical scribe. The creation of this document is based on the provider's statements to the medical scribe.  Carlos Hilliard 11:09 AM April 25, 2018    OBJECTIVE:   /84  Temp 97.7  F (36.5  C) (Tympanic)  Ht 5' 6\" (1.676 m)  Wt 206 lb 6 oz (93.6 kg)  SpO2 98%  BMI 33.31 kg/m2  Body mass index is 33.31 kg/(m^2).  GENERAL: healthy, alert and no distress  HENT: erythematous right TM with retraction and bulging, otherwise ear canals and TM's normal, nose and mouth without ulcers or lesions  NECK: no adenopathy, no asymmetry, masses, or scars and thyroid normal to palpation  RESP: lungs clear to auscultation - no rales, rhonchi or wheezes  CV: regular rates and rhythm, normal S1 S2, no S3 or S4, no murmur, click or rub, no irregular beats  SKIN: no suspicious lesions or rashes  PSYCH: mentation appears normal, affect normal/bright    Diagnostic Test Results:  none     ASSESSMENT/PLAN:   Tasneem was seen today for ear problem.    Diagnoses and all orders for this visit:    OME (otitis media with effusion), right, Allergic rhinitis, unspecified chronicity, unspecified seasonality, unspecified trigger  Start cefdinir to treat symptoms of right OME. Advised patient to finish course of " antibiotics even if symptoms subside. Also recommended pushing fluids and intake of daily probiotic or yogurt to avoid possible side effect of diarrhea. Recommended that patient continue use of fluticasone. Follow up if symptoms persist or worsen.   -     cefdinir (OMNICEF) 300 MG capsule; Take 1 capsule (300 mg) by mouth 2 times daily for 10 days  -     fluticasone (FLONASE) 50 MCG/ACT spray; USE 1 TO 2 SPRAYS IN EACH NOSTRIL DAILY    The information in this document, created by the medical scribe for me, accurately reflects the services I personally performed and the decisions made by me. I have reviewed and approved this document for accuracy prior to leaving the patient care area.  April 25, 2018 11:09 AM    Keisha Duong PA-C  [facility] [place]

## 2018-05-08 ENCOUNTER — TELEPHONE (OUTPATIENT)
Dept: MAMMOGRAPHY | Facility: CLINIC | Age: 63
End: 2018-05-08

## 2018-05-08 NOTE — TELEPHONE ENCOUNTER
5/8/2018    Attempt 1    Contacted patient in regards to scheduling VIP mammogram  Message on voicemail     Comments:       Outreach   ELENA

## 2018-06-05 ENCOUNTER — OFFICE VISIT (OUTPATIENT)
Dept: FAMILY MEDICINE | Facility: CLINIC | Age: 63
End: 2018-06-05
Payer: COMMERCIAL

## 2018-06-05 VITALS
OXYGEN SATURATION: 99 % | DIASTOLIC BLOOD PRESSURE: 82 MMHG | BODY MASS INDEX: 32.14 KG/M2 | SYSTOLIC BLOOD PRESSURE: 120 MMHG | HEIGHT: 66 IN | TEMPERATURE: 98.1 F | WEIGHT: 200 LBS | HEART RATE: 77 BPM

## 2018-06-05 DIAGNOSIS — J45.30 MILD PERSISTENT ASTHMA WITHOUT COMPLICATION: ICD-10-CM

## 2018-06-05 DIAGNOSIS — F43.23 ADJUSTMENT DISORDER WITH MIXED ANXIETY AND DEPRESSED MOOD: Primary | ICD-10-CM

## 2018-06-05 PROCEDURE — 99214 OFFICE O/P EST MOD 30 MIN: CPT | Performed by: PHYSICIAN ASSISTANT

## 2018-06-05 ASSESSMENT — ANXIETY QUESTIONNAIRES
6. BECOMING EASILY ANNOYED OR IRRITABLE: NEARLY EVERY DAY
5. BEING SO RESTLESS THAT IT IS HARD TO SIT STILL: NEARLY EVERY DAY
GAD7 TOTAL SCORE: 18
7. FEELING AFRAID AS IF SOMETHING AWFUL MIGHT HAPPEN: NOT AT ALL
1. FEELING NERVOUS, ANXIOUS, OR ON EDGE: NEARLY EVERY DAY
3. WORRYING TOO MUCH ABOUT DIFFERENT THINGS: NEARLY EVERY DAY
IF YOU CHECKED OFF ANY PROBLEMS ON THIS QUESTIONNAIRE, HOW DIFFICULT HAVE THESE PROBLEMS MADE IT FOR YOU TO DO YOUR WORK, TAKE CARE OF THINGS AT HOME, OR GET ALONG WITH OTHER PEOPLE: SOMEWHAT DIFFICULT
2. NOT BEING ABLE TO STOP OR CONTROL WORRYING: NEARLY EVERY DAY

## 2018-06-05 ASSESSMENT — PATIENT HEALTH QUESTIONNAIRE - PHQ9: 5. POOR APPETITE OR OVEREATING: NEARLY EVERY DAY
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
## 2018-06-05 NOTE — PATIENT INSTRUCTIONS
Please start the Zoloft as prescribed:  Take 0.5 tablets (25 mg) by mouth daily For the first 10 days, then increase to one full tab once daily.    Please also take the Advair for the asthma as prescribed.  Take one puff two times daily every day.  Can take the albuterol (red one) every 4-6 hours as needed for the shortness of breath, cough or wheezing.      Please followup in the next 3-4 weeks for a recheck.  Please be seen sooner if needed.     Please seek more immediate care if you have thoughts of wanting to hurt yourself or others.   Understanding Anxiety Disorders  Almost everyone gets nervous now and then. It s normal to have knots in your stomach before a test, or for your heart to race on a first date. But an anxiety disorder is much more than a case of nerves. In fact, its symptoms may be overwhelming. But treatment can relieve many of these symptoms. Talking to your healthcare provider is the first step.    What are anxiety disorders?  An anxiety disorder causes intense feelings of panic and fear. These feelings may arise for no apparent reason. And they tend to recur again and again. They may prevent you from coping with life and cause you great distress. As a result, you may avoid anything that triggers your fear. In extreme cases, you may never leave the house. Anxiety disorders may cause other symptoms, such as:    Obsessive thoughts you can t control    Constant nightmares or painful thoughts of the past    Nausea, sweating, and muscle tension    Trouble sleeping or concentrating  What causes anxiety disorders?  Anxiety disorders tend to run in families. For some people, childhood abuse or neglect may play a role. For others, stressful life events or trauma may trigger anxiety disorders. Anxiety can trigger low self-esteem and poor coping skills.  Common anxiety disorders    Panic disorder. This causes an intense fear of being in danger.    Phobias. These are extreme fears of certain objects,  places, or events.    Obsessive-compulsive disorder. This causes you to have unwanted thoughts and urges. You also may perform certain actions over and over.    Posttraumatic stress disorder. This occurs in people who have survived a terrible ordeal. It can cause nightmares and flashbacks about the event.    Generalized anxiety disorder. This causes constant worry that can greatly disrupt your life.   Getting better  You may believe that nothing can help you. Or, you might fear what others may think. But most anxiety symptoms can be eased. Having an anxiety disorder is nothing to be ashamed of. Most people do best with treatment that combines medicine and therapy. These aren t cures. But they can help you live a healthier life.  Date Last Reviewed: 2/1/2017 2000-2017 [employer] [place]. [address] [place] [address], [place] [address]. All rights reserved. This information is not intended as a substitute for professional medical care. Always follow your healthcare professional's instructions.

## 2018-06-05 NOTE — MR AVS SNAPSHOT
After Visit Summary   6/5/2018    Tasneem Chan    MRN: [id number]           Patient Information     Date Of Birth          1955        Visit Information        Provider Department      6/5/2018 3:00 PM Jennifer Mcduffie PA-C [facility]        Today's Diagnoses     Adjustment disorder with mixed anxiety and depressed mood    -  1    Mild intermittent asthma with acute exacerbation          Care Instructions    Please start the Zoloft as prescribed:  Take 0.5 tablets (25 mg) by mouth daily For the first 10 days, then increase to one full tab once daily.    Please also take the Advair for the asthma as prescribed.  Take one puff two times daily every day.  Can take the albuterol (red one) every 4-6 hours as needed for the shortness of breath, cough or wheezing.      Please followup in the next 3-4 weeks for a recheck.  Please be seen sooner if needed.     Please seek more immediate care if you have thoughts of wanting to hurt yourself or others.   Understanding Anxiety Disorders  Almost everyone gets nervous now and then. It s normal to have knots in your stomach before a test, or for your heart to race on a first date. But an anxiety disorder is much more than a case of nerves. In fact, its symptoms may be overwhelming. But treatment can relieve many of these symptoms. Talking to your healthcare provider is the first step.    What are anxiety disorders?  An anxiety disorder causes intense feelings of panic and fear. These feelings may arise for no apparent reason. And they tend to recur again and again. They may prevent you from coping with life and cause you great distress. As a result, you may avoid anything that triggers your fear. In extreme cases, you may never leave the house. Anxiety disorders may cause other symptoms, such as:    Obsessive thoughts you can t control    Constant nightmares or painful thoughts of the past    Nausea, sweating, and muscle  tension    Trouble sleeping or concentrating  What causes anxiety disorders?  Anxiety disorders tend to run in families. For some people, childhood abuse or neglect may play a role. For others, stressful life events or trauma may trigger anxiety disorders. Anxiety can trigger low self-esteem and poor coping skills.  Common anxiety disorders    Panic disorder. This causes an intense fear of being in danger.    Phobias. These are extreme fears of certain objects, places, or events.    Obsessive-compulsive disorder. This causes you to have unwanted thoughts and urges. You also may perform certain actions over and over.    Posttraumatic stress disorder. This occurs in people who have survived a terrible ordeal. It can cause nightmares and flashbacks about the event.    Generalized anxiety disorder. This causes constant worry that can greatly disrupt your life.   Getting better  You may believe that nothing can help you. Or, you might fear what others may think. But most anxiety symptoms can be eased. Having an anxiety disorder is nothing to be ashamed of. Most people do best with treatment that combines medicine and therapy. These aren t cures. But they can help you live a healthier life.  Date Last Reviewed: 2/1/2017 2000-2017 [employer]. [address] [place] [address]. All rights reserved. This information is not intended as a substitute for professional medical care. Always follow your healthcare professional's instructions.                Follow-ups after your visit        Follow-up notes from your care team     Return in about 3 weeks (around 6/26/2018) for medication re-check, Re-check of symptoms, please be seen sooner if needed.      Your next 10 appointments already scheduled     Jul 23, 2018 11:30 AM CDT   [facility]    [address] [place] [id number] [phone]        "       Who to contact     If you have questions or need follow up information about today's clinic visit or your schedule please contact [facility] directly at [phone].  Normal or non-critical lab and imaging results will be communicated to you by MyChart, letter or phone within 4 business days after the clinic has received the results. If you do not hear from us within 7 days, please contact the clinic through MyChart or phone. If you have a critical or abnormal lab result, we will notify you by phone as soon as possible.  Submit refill requests through [employer] or call your pharmacy and they will forward the refill request to us. Please allow 3 business days for your refill to be completed.          Additional Information About Your Visit        [employer] Information     [employer] lets you send messages to your doctor, view your test results, renew your prescriptions, schedule appointments and more. To sign up, go to [email or web address] . Click on \"Log in\" on the left side of the screen, which will take you to the Welcome page. Then click on \"Sign up Now\" on the right side of the page.     You will be asked to enter the access code listed below, as well as some personal information. Please follow the directions to create your username and password.     Your access code is: [email or web address]  Expires: 2018 11:17 AM     Your access code will  in 90 days. If you need help or a new code, please call your [facility] clinic or [phone].        Care EveryWhere ID     This is your Care EveryWhere ID. This could be used by other organizations to access your [facility] medical records  [id number]        Your Vitals Were     Pulse Temperature Height Pulse Oximetry Breastfeeding? BMI (Body Mass Index)    77 98.1  F (36.7  C) (Oral) 5' 6\" (1.676 m) 99% No 32.28 kg/m2       Blood Pressure from Last 3 Encounters:   18 120/82   18 126/84   18 128/86    Weight from Last 3 Encounters: "   06/05/18 200 lb (90.7 kg)   04/25/18 206 lb 6 oz (93.6 kg)   04/09/18 205 lb (93 kg)              Today, you had the following     No orders found for display         Today's Medication Changes          These changes are accurate as of 6/5/18  4:07 PM.  If you have any questions, ask your nurse or doctor.               Start taking these medicines.        Dose/Directions    sertraline 50 MG tablet   Commonly known as:  ZOLOFT   Used for:  Adjustment disorder with mixed anxiety and depressed mood   Started by:  Jennifer Mcduffie PA-C        Dose:  25 mg   Take 0.5 tablets (25 mg) by mouth daily For the first 10 days, then increase to one full tab once daily.   Quantity:  30 tablet   Refills:  1            Where to get your medicines      These medications were sent to [facility] [place] [id number]     Phone:  [phone]     sertraline 50 MG tablet                Primary Care Provider Office Phone # Fax #    Zayra Villanueva [phone]       [address] [place] [address]        Equal Access to Services     ALEX ROSAS : Hadii padmini ojeda hadasho Soomaali, waaxda luqadaha, qaybta kaalmada adeegyada, dustin chau. So [facility] [phone].    ATENCIÓN: Si habla español, tiene a curtis disposición servicios gratuitos de asistencia lingüística. [facility] [phone].    We comply with applicable federal civil rights laws and [place] laws. We do not discriminate on the basis of race, color, national origin, age, disability, sex, sexual orientation, or gender identity.            Thank you!     Thank you for choosing [facility]  for your care. Our goal is always to provide you with excellent care. Hearing back from our patients is one way we can continue to improve our services. Please take a few minutes to complete the written survey that you may receive  in the mail after your visit with us. Thank you!             Your Updated Medication List - Protect others around you: Learn how to safely use, store and throw away your medicines at www.disposemymeds.org.          This list is accurate as of 6/5/18  4:07 PM.  Always use your most recent med list.                   Brand Name Dispense Instructions for use Diagnosis    * albuterol (2.5 MG/3ML) 0.083% neb solution     25 vial    Take 1 vial (2.5 mg) by nebulization every 6 hours as needed for shortness of breath / dyspnea or wheezing    Mild intermittent asthma with acute exacerbation       * albuterol 108 (90 Base) MCG/ACT Inhaler    PROAIR HFA    25.5 g    USE 1 TO 2 INHALATIONS EVERY 4 TO 6 HOURS AS NEEDED    Mild intermittent asthma with acute exacerbation       cetirizine 10 MG tablet    ZYRTEC    30 tablet    Take 1 tablet by mouth daily.    Rash       cyclobenzaprine 10 MG tablet    FLEXERIL    20 tablet    1/2 tab during the day and 1 tab at bedtime as needed for muscle spasms    Acute midline thoracic back pain       fluticasone 50 MCG/ACT spray    FLONASE    16 g    USE 1 TO 2 SPRAYS IN EACH NOSTRIL DAILY    Allergic rhinitis, unspecified chronicity, unspecified seasonality, unspecified trigger       fluticasone-salmeterol 250-50 MCG/DOSE diskus inhaler    ADVAIR DISKUS    1 Inhaler    Inhale 1 puff into the lungs every 12 hours During asthma exacerbations    Mild intermittent asthma with acute exacerbation       gabapentin 300 MG capsule    NEURONTIN    180 capsule    Take 1 capsule (300 mg) by mouth 2 times daily    Left ankle injury, initial encounter, Chronic pain of left knee       montelukast 10 MG tablet    SINGULAIR    90 tablet    TAKE 1 TABLET DAILY    Mild persistent asthma without complication, Intermittent asthma, with acute exacerbation       order for DME     1 Units    cpap machine and needed tubing and accessories    SUSHIL (obstructive sleep apnea)       order for DME     1 Units    Nebulizer  machine Qty #1    Asthma exacerbation, moderate persistent       order for DME     1 Device    Ankle brace    Bilateral foot pain, Posterior tibial tendon dysfunction, left, Pes planus of both feet, Posterior tibial tendonitis, right, Venous insufficiency of both lower extremities       scopolamine 72 hr patch    TRANSDERM-SCOP (1.5 MG)    5 patch    Place 1 patch onto the skin every 72 hours Apply to hairless area behind one ear at least 4 hours before travel.    Other specified counseling       sertraline 50 MG tablet    ZOLOFT    30 tablet    Take 0.5 tablets (25 mg) by mouth daily For the first 10 days, then increase to one full tab once daily.    Adjustment disorder with mixed anxiety and depressed mood       SUMAtriptan 100 MG tablet    IMITREX    27 tablet    TAKE AS INSTRUCTED BY YOUR PRESCRIBER    Migraine with aura       verapamil 240 MG CR tablet    CALAN-SR    90 tablet    TAKE 1 TABLET AT BEDTIME    Migraine with aura       * Notice:  This list has 2 medication(s) that are the same as other medications prescribed for you. Read the directions carefully, and ask your doctor or other care provider to review them with you.

## 2018-06-06 ASSESSMENT — PATIENT HEALTH QUESTIONNAIRE - PHQ9: SUM OF ALL RESPONSES TO PHQ QUESTIONS 1-9: 11

## 2018-06-06 ASSESSMENT — ANXIETY QUESTIONNAIRES: GAD7 TOTAL SCORE: 18

## 2018-06-13 DIAGNOSIS — G43.109 MIGRAINE WITH AURA: ICD-10-CM

## 2018-06-13 NOTE — TELEPHONE ENCOUNTER
"Requested Prescriptions   Pending Prescriptions Disp Refills     verapamil (CALAN-SR) 240 MG CR tablet [Pharmacy Med Name: VERAPAMIL HCL ER TABS 240MG] 90 tablet 3        Last Written Prescription Date:  6.19.17  Last Fill Quantity: 90,  # refills: 3   Last Office Visit: 6/5/2018   Future Office Visit:    Next 5 appointments (look out 90 days)     Jun 25, 2018 11:20 AM CDT   [facility] with Jennifer Mcduffie PA-C   [facility]    [address] [place] [id number]   [phone]            Jul 23, 2018 11:30 AM CDT   PHYSICAL with Zayra Villanueva MD   [facility]    [address] [place] [id number]   [phone]                  Sig: TAKE 1 TABLET AT BEDTIME    Calcium Channel Blockers Protocol  Passed    6/13/2018 12:07 AM       Passed - Blood pressure under 140/90 in past 12 months    BP Readings from Last 3 Encounters:   06/05/18 120/82   04/25/18 126/84   04/09/18 128/86                Passed - Normal ALT in past 12 months    Recent Labs   Lab Test  03/22/18   1307   ALT  21            Passed - Recent (12 mo) or future (30 days) visit within the authorizing provider's specialty    Patient had office visit in the last 12 months or has a visit in the next 30 days with authorizing provider or within the authorizing provider's specialty.  See \"Patient Info\" tab in inbasket, or \"Choose Columns\" in Meds & Orders section of the refill encounter.           Passed - Patient is age 18 or older       Passed - No active pregnancy on record       Passed - Normal serum creatinine on file in past 12 months    Recent Labs   Lab Test  03/22/18   1307   CR  0.57            Passed - No positive pregnancy test in past 12 months          "

## 2018-06-14 DIAGNOSIS — J45.30 MILD PERSISTENT ASTHMA WITHOUT COMPLICATION: ICD-10-CM

## 2018-06-14 DIAGNOSIS — J45.21 INTERMITTENT ASTHMA, WITH ACUTE EXACERBATION: ICD-10-CM

## 2018-06-14 RX ORDER — MONTELUKAST SODIUM 10 MG/1
TABLET ORAL
Qty: 90 TABLET | Refills: 0 | Status: SHIPPED | OUTPATIENT
Start: 2018-06-14 | End: 2018-09-11

## 2018-06-14 RX ORDER — VERAPAMIL HYDROCHLORIDE 240 MG/1
TABLET, FILM COATED, EXTENDED RELEASE ORAL
Qty: 90 TABLET | Refills: 0 | Status: SHIPPED | OUTPATIENT
Start: 2018-06-14 | End: 2018-09-11

## 2018-06-14 NOTE — TELEPHONE ENCOUNTER
Refilled per RN Protocol.     Pamela Martin, RN  [facility]

## 2018-06-14 NOTE — TELEPHONE ENCOUNTER
Prescription approved per [facility] Refill Protocol.    Noemi Villanueva, BS, RN, N  [facility]) [phone]

## 2018-06-14 NOTE — TELEPHONE ENCOUNTER
"Requested Prescriptions   Pending Prescriptions Disp Refills     montelukast (SINGULAIR) 10 MG tablet [Pharmacy Med Name: MONTELUKAST SOD TABS 10MG] 90 tablet 3    Last Written Prescription Date:  6/19/17  Last Fill Quantity: 90,  # refills: 3   Last office visit: 6/5/2018 with prescribing provider:  NO   Future Office Visit:   Next 5 appointments (look out 90 days)     Jun 25, 2018 11:20 AM CDT   Office Visit with Jennifer Mcduffie PA-C   [facility]    [address] [place] [phone]   [phone]            Jul 23, 2018 11:30 AM CDT   PHYSICAL with Zayra Villanueva MD   [facility]    [address] [place] [phone]   [phone]                  Sig: TAKE 1 TABLET DAILY    Leukotriene Inhibitors Protocol Passed    6/14/2018 12:54 AM       Passed - Patient is age 12 or older    If patient is under 16, ok to refill using age based dosing.          Passed - Asthma control assessment score within normal limits in last 6 months    Please review ACT score.          Passed - Recent (6 mo) or future (30 days) visit within the authorizing provider's specialty    Patient had office visit in the last 6 months or has a visit in the next 30 days with authorizing provider or within the authorizing provider's specialty.  See \"Patient Info\" tab in inbasket, or \"Choose Columns\" in Meds & Orders section of the refill encounter.              "

## 2018-06-25 ENCOUNTER — OFFICE VISIT (OUTPATIENT)
Dept: FAMILY MEDICINE | Facility: CLINIC | Age: 63
End: 2018-06-25
Payer: COMMERCIAL

## 2018-06-25 VITALS
HEIGHT: 66 IN | OXYGEN SATURATION: 98 % | WEIGHT: 198 LBS | BODY MASS INDEX: 31.82 KG/M2 | SYSTOLIC BLOOD PRESSURE: 110 MMHG | TEMPERATURE: 98.6 F | DIASTOLIC BLOOD PRESSURE: 64 MMHG | HEART RATE: 67 BPM

## 2018-06-25 DIAGNOSIS — F51.02 ADJUSTMENT INSOMNIA: Primary | ICD-10-CM

## 2018-06-25 DIAGNOSIS — F41.9 ANXIETY: ICD-10-CM

## 2018-06-25 PROCEDURE — 99214 OFFICE O/P EST MOD 30 MIN: CPT | Performed by: PHYSICIAN ASSISTANT

## 2018-06-25 RX ORDER — TRAZODONE HYDROCHLORIDE 50 MG/1
50 TABLET, FILM COATED ORAL
Qty: 30 TABLET | Refills: 1 | Status: SHIPPED | OUTPATIENT
Start: 2018-06-25 | End: 2018-07-02

## 2018-06-25 ASSESSMENT — ANXIETY QUESTIONNAIRES
2. NOT BEING ABLE TO STOP OR CONTROL WORRYING: NEARLY EVERY DAY
6. BECOMING EASILY ANNOYED OR IRRITABLE: SEVERAL DAYS
3. WORRYING TOO MUCH ABOUT DIFFERENT THINGS: MORE THAN HALF THE DAYS
1. FEELING NERVOUS, ANXIOUS, OR ON EDGE: MORE THAN HALF THE DAYS
5. BEING SO RESTLESS THAT IT IS HARD TO SIT STILL: SEVERAL DAYS
7. FEELING AFRAID AS IF SOMETHING AWFUL MIGHT HAPPEN: MORE THAN HALF THE DAYS
GAD7 TOTAL SCORE: 13
IF YOU CHECKED OFF ANY PROBLEMS ON THIS QUESTIONNAIRE, HOW DIFFICULT HAVE THESE PROBLEMS MADE IT FOR YOU TO DO YOUR WORK, TAKE CARE OF THINGS AT HOME, OR GET ALONG WITH OTHER PEOPLE: SOMEWHAT DIFFICULT

## 2018-06-25 ASSESSMENT — PATIENT HEALTH QUESTIONNAIRE - PHQ9: 5. POOR APPETITE OR OVEREATING: MORE THAN HALF THE DAYS

## 2018-06-25 NOTE — PROGRESS NOTES
"  SUBJECTIVE:                                                    Tasneem Chan is a 62 year old female who presents to clinic today for the following health issues:      Depression and Anxiety Follow-Up    Status since last visit: No change    Other associated symptoms:still not able to sleep    Complicating factors:     Significant life event: Yes - Issues getting patient's mom taken care of medically as she is in a different state.  They will be moving her to [place].         Current substance abuse: None    PHQ-9 5/8/2017 6/5/2018 6/25/2018   Total Score 4 11 10   Q9: Suicide Ideation Not at all Not at all Not at all     IJEOMA-7 SCORE 5/8/2017 6/5/2018 6/25/2018   Total Score 14 18 13     In the past two weeks have you had thoughts of suicide or self-harm?  No.    Do you have concerns about your personal safety or the safety of others?   No  PHQ-9  English  PHQ-9   Any Language  IJEOMA-7  Suicide Assessment Five-step Evaluation and Treatment (SAFE-T)    Amount of exercise or physical activity: 3-4 days per week - 90 minutes    Problems taking medications regularly: No    Medication side effects: none    Diet: low carbs, stopped drinking soda - making healthier options    Patient has started to Zoloft and reports that she is doing fine on it, she denies any issues tolerating it.  She has not noticed much of a change since starting it.  She is most concerned today with her lack of sleep due to the anxiety surrounding her mom's current medical care.      Problem list and histories reviewed & adjusted, as indicated.  Additional history: as documented      ROS:  Constitutional, HEENT, cardiovascular, pulmonary, GI, , musculoskeletal, neuro, skin, endocrine and psych systems are negative, except as otherwise noted.    OBJECTIVE:                                                    /64 (BP Location: Left arm, Patient Position: Chair, Cuff Size: Adult Large)  Pulse 67  Temp 98.6  F (37  C) (Oral)  Ht 5' 6\" (1.676 m) "  Wt 198 lb (89.8 kg)  SpO2 98%  Breastfeeding? No  BMI 31.96 kg/m2  Body mass index is 31.96 kg/(m^2).  GENERAL: healthy, alert and no distress  EYES: Eyes grossly normal to inspection, PERRL and conjunctivae and sclerae normal  RESP: lungs clear to auscultation - no rales, rhonchi or wheezes  CV: regular rate and rhythm, normal S1 S2, no S3 or S4, no murmur, click or rub, no peripheral edema and peripheral pulses strong  MS: no gross musculoskeletal defects noted, no edema  NEURO: Normal strength and tone, mentation intact and speech normal  PSYCH: mentation appears normal, affect normal/bright    Diagnostic Test Results:  none      ASSESSMENT/PLAN:                                                      Tasneem was seen today for recheck medication.    Diagnoses and all orders for this visit:    Adjustment insomnia  -     traZODone (DESYREL) 50 MG tablet; Take 1 tablet (50 mg) by mouth nightly as needed for sleep    Anxiety      - Patient can continue the low dose Zoloft, as the PHQ9 and GAD7 have minimally improved and patient feels as though she may be coping better.  She is not interested in increasing this for now.     - Patient can take low dose trazodone to help with the sleep at night if needed.  She has been advised to start with low dose (1/2 tab) and can take one full tab if needed.   - Close followup has been advised.    - Patient to be seen sooner if needed.   - Patient contracts for safety today and denies having any thoughts of wanting to hurt herself or others.      -- I have discussed the patient's diagnosis, and my plan of treatment with the patient and/or family. Patient is aware to followup if symptoms do not improve.  Patient has been advised to be seen sooner or seek more immediate care if symptoms change or worsen.  Patient agrees with and understands the plan today.     See Patient Instructions        Jennifer Mcduffie PA-C    [facility]

## 2018-06-25 NOTE — LETTER
My Depression Action Plan  Name: Tasneem Chan   Date of Birth 1955  Date: 6/25/2018    My doctor: Zayra Villanueva   My clinic: [address] [address] [phone]          GREEN    ZONE   Good Control    What it looks like:     Things are going generally well. You have normal up s and down s. You may even feel depressed from time to time, but bad moods usually last less than a day.   What you need to do:  1. Continue to care for yourself (see self care plan)  2. Check your depression survival kit and update it as needed  3. Follow your physician s recommendations including any medication.  4. Do not stop taking medication unless you consult with your physician first.           YELLOW         ZONE Getting Worse    What it looks like:     Depression is starting to interfere with your life.     It may be hard to get out of bed; you may be starting to isolate yourself from others.    Symptoms of depression are starting to last most all day and this has happened for several days.     You may have suicidal thoughts but they are not constant.   What you need to do:     1. Call your care team, your response to treatment will improve if you keep your care team informed of your progress. Yellow periods are signs an adjustment may need to be made.     2. Continue your self-care, even if you have to fake it!    3. Talk to someone in your support network    4. Open up your depression survival kit           RED    ZONE Medical Alert - Get Help    What it looks like:     Depression is seriously interfering with your life.     You may experience these or other symptoms: You can t get out of bed most days, can t work or engage in other necessary activities, you have trouble taking care of basic hygiene, or basic responsibilities, thoughts of suicide or death that will not go away, self-injurious behavior.     What you need to do:  1. Call  your care team and request a same-day appointment. If they are not available (weekends or after hours) call your local crisis line, emergency room or 911.            Depression Self Care Plan / Survival Kit    Self-Care for Depression  Here s the deal. Your body and mind are really not as separate as most people think.  What you do and think affects how you feel and how you feel influences what you do and think. This means if you do things that people who feel good do, it will help you feel better.  Sometimes this is all it takes.  There is also a place for medication and therapy depending on how severe your depression is, so be sure to consult with your medical provider and/ or Behavioral Health Consultant if your symptoms are worsening or not improving.     In order to better manage my stress, I will:    Exercise  Get some form of exercise, every day. This will help reduce pain and release endorphins, the  feel good  chemicals in your brain. This is almost as good as taking antidepressants!  This is not the same as joining a gym and then never going! (they count on that by the way ) It can be as simple as just going for a walk or doing some gardening, anything that will get you moving.      Hygiene   Maintain good hygiene (Get out of bed in the morning, Make your bed, Brush your teeth, Take a shower, and Get dressed like you were going to work, even if you are unemployed).  If your clothes don't fit try to get ones that do.    Diet  I will strive to eat foods that are good for me, drink plenty of water, and avoid excessive sugar, caffeine, alcohol, and other mood-altering substances.  Some foods that are helpful in depression are: complex carbohydrates, B vitamins, flaxseed, fish or fish oil, fresh fruits and vegetables.    Psychotherapy  I agree to participate in Individual Therapy (if recommended).    Medication  If prescribed medications, I agree to take them.  Missing doses can result in serious side effects.   I understand that drinking alcohol, or other illicit drug use, may cause potential side effects.  I will not stop my medication abruptly without first discussing it with my provider.    Staying Connected With Others  I will stay in touch with my friends, family members, and my primary care provider/team.    Use your imagination  Be creative.  We all have a creative side; it doesn t matter if it s oil painting, sand castles, or mud pies! This will also kick up the endorphins.    Witness Beauty  (AKA stop and smell the roses) Take a look outside, even in mid-winter. Notice colors, textures. Watch the squirrels and birds.     Service to others  Be of service to others.  There is always someone else in need.  By helping others we can  get out of ourselves  and remember the really important things.  This also provides opportunities for practicing all the other parts of the program.    Humor  Laugh and be silly!  Adjust your TV habits for less news and crime-drama and more comedy.    Control your stress  Try breathing deep, massage therapy, biofeedback, and meditation. Find time to relax each day.     My support system    Clinic Contact:  Phone number:    Contact 1:  Phone number:    Contact 2:  Phone number:    Rastafari/:  Phone number:    Therapist:  Phone number:    [facility] crisis center:    Phone number:    Other community support:  Phone number:

## 2018-06-25 NOTE — MR AVS SNAPSHOT
After Visit Summary   6/25/2018    Tasneem Chan    MRN: [id number]           Patient Information     Date Of Birth          1955        Visit Information        Provider Department      6/25/2018 11:20 AM Jennifer Mcduffie PA-C [facility]        Today's Diagnoses     Adjustment insomnia    -  1      Care Instructions    Please start with 1/2 tab on the first night and can go to one full tab on night two if this is not effective.      Please followup in about one week, or sooner if needed.            Follow-ups after your visit        Follow-up notes from your care team     Return in about 1 week (around 7/2/2018) for medication re-check, please be seen sooner if needed.      Your next 10 appointments already scheduled     Jul 23, 2018 11:30 AM CDT   [facility] with [facility]ay[facility]ueva [facility]   [facility]    [address] [place] [id number] [phone]              Who to contact     If you have questions or need follow up information about today's clinic visit or your schedule please contact [facility] directly at [phone].  Normal or non-critical lab and imaging results will be communicated to you by MyChart, letter or phone within 4 business days after the clinic has received the results. If you do not hear from us within 7 days, please contact the clinic through MyChart or phone. If you have a critical or abnormal lab result, we will notify you by phone as soon as possible.  Submit refill requests through [employer] or call your pharmacy and they will forward the refill request to us. Please allow 3 business days for your refill to be completed.          Additional Information About Your Visit        MyChart Information     [employer] lets you send messages to your doctor, view your test results, renew your prescriptions, schedule appointments and more. To sign up, go to  [email or web address] . Click on \"Log in\" on the left side of the screen, which will take you to the Welcome page. Then click on \"Sign up Now\" on the right side of the page.     You will be asked to enter the access code listed below, as well as some personal information. Please follow the directions to create your username and password.     Your access code is: [email or web address]  Expires: 2018 11:17 AM     Your access code will  in 90 days. If you need help or a new code, please call your [facility] clinic or [phone].        Care EveryWhere ID     This is your Care EveryWhere ID. This could be used by other organizations to access your [facility] medical records  [id number]        Your Vitals Were     Pulse Temperature Height Pulse Oximetry Breastfeeding? BMI (Body Mass Index)    67 98.6  F (37  C) (Oral) 5' 6\" (1.676 m) 98% No 31.96 kg/m2       Blood Pressure from Last 3 Encounters:   18 110/64   18 120/82   18 126/84    Weight from Last 3 Encounters:   18 198 lb (89.8 kg)   18 200 lb (90.7 kg)   18 206 lb 6 oz (93.6 kg)              Today, you had the following     No orders found for display         Today's Medication Changes          These changes are accurate as of 18 12:09 PM.  If you have any questions, ask your nurse or doctor.               Start taking these medicines.        Dose/Directions    traZODone 50 MG tablet   Commonly known as:  DESYREL   Used for:  Adjustment insomnia   Started by:  Jennifer Mcduffie PA-C        Dose:  50 mg   Take 1 tablet (50 mg) by mouth nightly as needed for sleep   Quantity:  30 tablet   Refills:  1            Where to get your medicines      These medications were sent to [facility] - [address] [address]     Phone:  [phone]     traZODone 50 MG tablet                Primary Care Provider Office Phone # Fax #    Zayra LEUNG " MD Kay [phone]       [address] [place] [address]        Equal Access to Services     ODILON ROSAS : Hadii aad ku haddavidirineo Rowland, arturoda benjagudeliaha, doryezequiel kaoc beth, dustin mimiin hayaasheryl moresusan loja laKarleemartir chau. So [facility] [phone].    ATENCIÓN: Si habla español, tiene a curtis disposición servicios gratuitos de asistencia lingüística. Llame al [phone].    We comply with applicable federal civil rights laws and [place] laws. We do not discriminate on the basis of race, color, national origin, age, disability, sex, sexual orientation, or gender identity.            Thank you!     Thank you for choosing [facility]  for your care. Our goal is always to provide you with excellent care. Hearing back from our patients is one way we can continue to improve our services. Please take a few minutes to complete the written survey that you may receive in the mail after your visit with us. Thank you!             Your Updated Medication List - Protect others around you: Learn how to safely use, store and throw away your medicines at www.disposemymeds.org.          This list is accurate as of 6/25/18 12:09 PM.  Always use your most recent med list.                   Brand Name Dispense Instructions for use Diagnosis    * albuterol (2.5 MG/3ML) 0.083% neb solution     25 vial    Take 1 vial (2.5 mg) by nebulization every 6 hours as needed for shortness of breath / dyspnea or wheezing    Mild intermittent asthma with acute exacerbation       * albuterol 108 (90 Base) MCG/ACT Inhaler    PROAIR HFA    25.5 g    USE 1 TO 2 INHALATIONS EVERY 4 TO 6 HOURS AS NEEDED    Mild intermittent asthma with acute exacerbation       cetirizine 10 MG tablet    ZYRTEC    30 tablet    Take 1 tablet by mouth daily.    Rash       cyclobenzaprine 10 MG tablet    FLEXERIL    20 tablet    1/2 tab during the day and 1 tab at bedtime as needed for muscle spasms    Acute midline thoracic  back pain       fluticasone 50 MCG/ACT spray    FLONASE    16 g    USE 1 TO 2 SPRAYS IN EACH NOSTRIL DAILY    Allergic rhinitis, unspecified chronicity, unspecified seasonality, unspecified trigger       fluticasone-salmeterol 250-50 MCG/DOSE diskus inhaler    ADVAIR DISKUS    1 Inhaler    Inhale 1 puff into the lungs every 12 hours During asthma exacerbations    Mild intermittent asthma with acute exacerbation       gabapentin 300 MG capsule    NEURONTIN    180 capsule    Take 1 capsule (300 mg) by mouth 2 times daily    Left ankle injury, initial encounter, Chronic pain of left knee       montelukast 10 MG tablet    SINGULAIR    90 tablet    TAKE 1 TABLET DAILY    Mild persistent asthma without complication, Intermittent asthma, with acute exacerbation       order for DME     1 Units    cpap machine and needed tubing and accessories    SUSHIL (obstructive sleep apnea)       order for DME     1 Units    Nebulizer machine Qty #1    Asthma exacerbation, moderate persistent       order for DME     1 Device    Ankle brace    Bilateral foot pain, Posterior tibial tendon dysfunction, left, Pes planus of both feet, Posterior tibial tendonitis, right, Venous insufficiency of both lower extremities       sertraline 50 MG tablet    ZOLOFT    30 tablet    Take 0.5 tablets (25 mg) by mouth daily For the first 10 days, then increase to one full tab once daily.    Adjustment disorder with mixed anxiety and depressed mood       SUMAtriptan 100 MG tablet    IMITREX    27 tablet    TAKE AS INSTRUCTED BY YOUR PRESCRIBER    Migraine with aura       TRANSDERM-SCOP (1.5 MG) 72 hr patch   Generic drug:  scopolamine     4 patch    PLACE ONE PATCH ONTO THE SKIN EVERY 72 HOURS. APPLY TO HAIRLESS AREA BEHIND ONE EAR AT LEAST 4 HOURS BEFORE TRAVEL.  REMOVE OLD PATCH    Other specified counseling       traZODone 50 MG tablet    DESYREL    30 tablet    Take 1 tablet (50 mg) by mouth nightly as needed for sleep    Adjustment insomnia        verapamil 240 MG CR tablet    CALAN-SR    90 tablet    TAKE 1 TABLET AT BEDTIME    Migraine with aura       * Notice:  This list has 2 medication(s) that are the same as other medications prescribed for you. Read the directions carefully, and ask your doctor or other care provider to review them with you.

## 2018-06-25 NOTE — PATIENT INSTRUCTIONS
Please start with 1/2 tab on the first night and can go to one full tab on night two if this is not effective.      Please followup in about one week, or sooner if needed.

## 2018-06-26 ASSESSMENT — ANXIETY QUESTIONNAIRES: GAD7 TOTAL SCORE: 13

## 2018-06-26 ASSESSMENT — PATIENT HEALTH QUESTIONNAIRE - PHQ9: SUM OF ALL RESPONSES TO PHQ QUESTIONS 1-9: 10

## 2018-07-02 ENCOUNTER — OFFICE VISIT (OUTPATIENT)
Dept: FAMILY MEDICINE | Facility: CLINIC | Age: 63
End: 2018-07-02
Payer: COMMERCIAL

## 2018-07-02 VITALS
BODY MASS INDEX: 31.98 KG/M2 | WEIGHT: 199 LBS | SYSTOLIC BLOOD PRESSURE: 118 MMHG | HEART RATE: 86 BPM | HEIGHT: 66 IN | TEMPERATURE: 98.3 F | DIASTOLIC BLOOD PRESSURE: 70 MMHG | OXYGEN SATURATION: 97 %

## 2018-07-02 DIAGNOSIS — F51.02 ADJUSTMENT INSOMNIA: ICD-10-CM

## 2018-07-02 DIAGNOSIS — H65.91 MIDDLE EAR EFFUSION, RIGHT: ICD-10-CM

## 2018-07-02 DIAGNOSIS — H92.01 OTALGIA, RIGHT: Primary | ICD-10-CM

## 2018-07-02 PROCEDURE — 99214 OFFICE O/P EST MOD 30 MIN: CPT | Performed by: PHYSICIAN ASSISTANT

## 2018-07-02 RX ORDER — PSEUDOEPHEDRINE HCL 120 MG/1
120 TABLET, FILM COATED, EXTENDED RELEASE ORAL EVERY 12 HOURS
Qty: 10 TABLET | Refills: 0
Start: 2018-07-02 | End: 2018-07-07

## 2018-07-02 RX ORDER — TRAZODONE HYDROCHLORIDE 50 MG/1
100 TABLET, FILM COATED ORAL
Qty: 30 TABLET | Refills: 1
Start: 2018-07-02 | End: 2018-10-26 | Stop reason: SINTOL

## 2018-07-02 RX ORDER — METHYLPREDNISOLONE 4 MG
TABLET, DOSE PACK ORAL
Qty: 21 TABLET | Refills: 0 | Status: SHIPPED | OUTPATIENT
Start: 2018-07-02 | End: 2018-10-26

## 2018-07-02 NOTE — MR AVS SNAPSHOT
"              After Visit Summary   7/2/2018    Tasneem Chan    MRN: [id number]           Patient Information     Date Of Birth          1955        Visit Information        Provider Department      7/2/2018 2:00 PM Keisha Duong PA-C [facility]        Today's Diagnoses     Middle ear effusion, right    -  1    Adjustment insomnia           Follow-ups after your visit        Your next 10 appointments already scheduled     Jul 23, 2018 11:30 AM CDT   PHYSICAL with Zayra Villanueva MD   [facility] ([facility])    [address] [place] [id number] [phone]              Who to contact     If you have questions or need follow up information about today's clinic visit or your schedule please contact [facility] directly at [phone].  Normal or non-critical lab and imaging results will be communicated to you by MyChart, letter or phone within 4 business days after the clinic has received the results. If you do not hear from us within 7 days, please contact the clinic through MyChart or phone. If you have a critical or abnormal lab result, we will notify you by phone as soon as possible.  Submit refill requests through [employer] or call your pharmacy and they will forward the refill request to us. Please allow 3 business days for your refill to be completed.          Additional Information About Your Visit        MyChart Information     [employer] lets you send messages to your doctor, view your test results, renew your prescriptions, schedule appointments and more. To sign up, go to [email or web address] . Click on \"Log in\" on the left side of the screen, which will take you to the Welcome page. Then click on \"Sign up Now\" on the right side of the page.     You will be asked to enter the access code listed below, as well as some personal information. Please follow the directions to create your username " "and password.     Your access code is: [email or web address]  Expires: 2018 11:17 AM     Your access code will  in 90 days. If you need help or a new code, please call your [facility] clinic or [phone].        Care EveryWhere ID     This is your Care EveryWhere ID. This could be used by other organizations to access your [facility] medical records  [id number]        Your Vitals Were     Pulse Temperature Height Pulse Oximetry BMI (Body Mass Index)       86 98.3  F (36.8  C) (Tympanic) 5' 6\" (1.676 m) 97% 32.12 kg/m2        Blood Pressure from Last 3 Encounters:   18 118/70   18 110/64   18 120/82    Weight from Last 3 Encounters:   18 199 lb (90.3 kg)   18 198 lb (89.8 kg)   18 200 lb (90.7 kg)              Today, you had the following     No orders found for display         Today's Medication Changes          These changes are accurate as of 18  2:25 PM.  If you have any questions, ask your nurse or doctor.               Start taking these medicines.        Dose/Directions    methylPREDNISolone 4 MG tablet   Commonly known as:  MEDROL DOSEPAK   Used for:  Middle ear effusion, right   Started by:  Keisha Duong PA-C        Follow package instructions   Quantity:  21 tablet   Refills:  0       pseudoePHEDrine 120 MG 12 hr tablet   Commonly known as:  SUDAFED   Used for:  Middle ear effusion, right   Started by:  Keisha Duong PA-C        Dose:  120 mg   Take 1 tablet (120 mg) by mouth every 12 hours for 5 days   Quantity:  10 tablet   Refills:  0         These medicines have changed or have updated prescriptions.        Dose/Directions    traZODone 50 MG tablet   Commonly known as:  DESYREL   This may have changed:  how much to take   Used for:  Adjustment insomnia   Changed by:  Keisha Duong PA-C        Dose:  100 mg   Take 2 tablets (100 mg) by mouth nightly as needed for sleep   Quantity:  30 tablet   Refills:  1            Where to get your " medicines      These medications were sent to [facility] - [place], [place] - [address]  [address], [facility] [address]     Phone:  [phone]     methylPREDNISolone 4 MG tablet         Some of these will need a paper prescription and others can be bought over the counter.  Ask your nurse if you have questions.     You don't need a prescription for these medications     pseudoePHEDrine 120 MG 12 hr tablet    traZODone 50 MG tablet                Primary Care Provider Office Phone # Fax #    Zayra Villanueva [phone]       [address] [place] [address]        Equal Access to Services     ODILON ROSAS : Hadii padmini ojeda hadasho Sojeannetteali, waaxda luqadaha, qaybta kaalmada adesusanyada, dustin chau. So [facility] [phone].    ATENCIÓN: Si habla español, tiene a curtis disposición servicios gratuitos de asistencia lingüística. [facility] [phone].    We comply with applicable federal civil rights laws and [place] laws. We do not discriminate on the basis of race, color, national origin, age, disability, sex, sexual orientation, or gender identity.            Thank you!     Thank you for choosing [facility]  for your care. Our goal is always to provide you with excellent care. Hearing back from our patients is one way we can continue to improve our services. Please take a few minutes to complete the written survey that you may receive in the mail after your visit with us. Thank you!             Your Updated Medication List - Protect others around you: Learn how to safely use, store and throw away your medicines at www.disposemymeds.org.          This list is accurate as of 7/2/18  2:25 PM.  Always use your most recent med list.                   Brand Name Dispense Instructions for use Diagnosis    * albuterol (2.5 MG/3ML) 0.083% neb solution     25 vial    Take 1 vial (2.5 mg) by nebulization  every 6 hours as needed for shortness of breath / dyspnea or wheezing    Mild intermittent asthma with acute exacerbation       * albuterol 108 (90 Base) MCG/ACT Inhaler    PROAIR HFA    25.5 g    USE 1 TO 2 INHALATIONS EVERY 4 TO 6 HOURS AS NEEDED    Mild intermittent asthma with acute exacerbation       cetirizine 10 MG tablet    ZYRTEC    30 tablet    Take 1 tablet by mouth daily.    Rash       cyclobenzaprine 10 MG tablet    FLEXERIL    20 tablet    1/2 tab during the day and 1 tab at bedtime as needed for muscle spasms    Acute midline thoracic back pain       fluticasone 50 MCG/ACT spray    FLONASE    16 g    USE 1 TO 2 SPRAYS IN EACH NOSTRIL DAILY    Allergic rhinitis, unspecified chronicity, unspecified seasonality, unspecified trigger       fluticasone-salmeterol 250-50 MCG/DOSE diskus inhaler    ADVAIR DISKUS    1 Inhaler    Inhale 1 puff into the lungs every 12 hours During asthma exacerbations    Mild intermittent asthma with acute exacerbation       gabapentin 300 MG capsule    NEURONTIN    180 capsule    Take 1 capsule (300 mg) by mouth 2 times daily    Left ankle injury, initial encounter, Chronic pain of left knee       methylPREDNISolone 4 MG tablet    MEDROL DOSEPAK    21 tablet    Follow package instructions    Middle ear effusion, right       montelukast 10 MG tablet    SINGULAIR    90 tablet    TAKE 1 TABLET DAILY    Mild persistent asthma without complication, Intermittent asthma, with acute exacerbation       order for DME     1 Units    cpap machine and needed tubing and accessories    SUSHIL (obstructive sleep apnea)       order for DME     1 Units    Nebulizer machine Qty #1    Asthma exacerbation, moderate persistent       order for DME     1 Device    Ankle brace    Bilateral foot pain, Posterior tibial tendon dysfunction, left, Pes planus of both feet, Posterior tibial tendonitis, right, Venous insufficiency of both lower extremities       pseudoePHEDrine 120 MG 12 hr tablet    SUDAFED     10 tablet    Take 1 tablet (120 mg) by mouth every 12 hours for 5 days    Middle ear effusion, right       sertraline 50 MG tablet    ZOLOFT    30 tablet    Take 0.5 tablets (25 mg) by mouth daily For the first 10 days, then increase to one full tab once daily.    Adjustment disorder with mixed anxiety and depressed mood       SUMAtriptan 100 MG tablet    IMITREX    27 tablet    TAKE AS INSTRUCTED BY YOUR PRESCRIBER    Migraine with aura       TRANSDERM-SCOP (1.5 MG) 72 hr patch   Generic drug:  scopolamine     4 patch    PLACE ONE PATCH ONTO THE SKIN EVERY 72 HOURS. APPLY TO HAIRLESS AREA BEHIND ONE EAR AT LEAST 4 HOURS BEFORE TRAVEL.  REMOVE OLD PATCH    Other specified counseling       traZODone 50 MG tablet    DESYREL    30 tablet    Take 2 tablets (100 mg) by mouth nightly as needed for sleep    Adjustment insomnia       verapamil 240 MG CR tablet    CALAN-SR    90 tablet    TAKE 1 TABLET AT BEDTIME    Migraine with aura       * Notice:  This list has 2 medication(s) that are the same as other medications prescribed for you. Read the directions carefully, and ask your doctor or other care provider to review them with you.

## 2018-07-02 NOTE — PROGRESS NOTES
"  SUBJECTIVE:   Tasneem Chan is a 62 year old female who presents to clinic today for the following health issues:    Right ear pain    Angela reports progressively worsening intermittent right ear \"fullness\" and mild pain since Friday, 6/29. The pain is made worse by certain movements or pressing on her right ear canal area. She tried taking Advil for the pain yesterday which helped with the pain, but has not tried other treatments. For her seasonal allergies, she reports taking Flonase 2 sprays in each nostril daily, Singulair, and Zyrtec. She reports ongoing nasal congestion which she attributes to her usual seasonal allergies, but denies fever, chills, cough, sore throat or post-nasal drip, eye irritation, headaches, dizziness, or swollen lymph nodes. She has a history of a right-sided meningioma excised from her right 8th cranial nerve in 2014/2015 with residual hearing changes, though denies any acute changes in her hearing.        Insomnia    Angela also reports continued difficulty with falling asleep. Denies frequent or early waking once she is asleep. She started taking 1/2 tab of 50 mg trazodone qhs last week and increased her dose to 50 mg qhs, but this did not help with her sleep. She denies noticing any side effects from the medication. Also reports some anxiety throughout the day, but denies depression.    Problem list and histories reviewed & adjusted, as indicated.  Additional history: as documented    Patient Active Problem List   Diagnosis     Malignant neoplasm of female breast (H)- in 2003 - right breast -s/p lumpectomy with radiation, tamoxifen     Intractable chronic migraine without aura and without status migrainosus     Irritable bowel syndrome     GERD (GASTROESOPHAGEAL REFLUX DISEASE)- much improved with diet     Intermittent asthma     Mild persistent asthma     Hearing loss, sensorineural, high frequency, right- from right 8th cranial nerve tumor (meningioma)     Left ankle injury, " initial encounter     Left knee pain     Advanced directives, counseling/discussion     Benign neoplasm of cranial nerve (H)     Blood pressure elevated without history of HTN     New daily persistent headache     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 distance      Past Surgical History:   Procedure Laterality Date     BREAST LUMPECTOMY, RT/LT      RT     C EXCIS INFRATENT MENINGIOMA      meningioma excised from 8th cranial nerve right side      SECTION       CRANIOTOMY, EXCISE TUMOR COMPLEX, COMBINED  2014    CraniotomyNotes: Suboccipital Tumor resection - Meningioma     SURGICAL HISTORY OF -       Rt Knee spur removal       Social History   Substance Use Topics     Smoking status: Never Smoker     Smokeless tobacco: Never Used     Alcohol use No     Family History   Problem Relation Age of Onset     Cardiovascular Mother      rheumatic fever, irregular rhythm     Pulmonary Embolism Father 61     2 weeks after hernia surgery     HEART DISEASE Sister      heart block         Current Outpatient Prescriptions   Medication Sig Dispense Refill     albuterol (2.5 MG/3ML) 0.083% neb solution Take 1 vial (2.5 mg) by nebulization every 6 hours as needed for shortness of breath / dyspnea or wheezing 25 vial 11     albuterol (PROAIR HFA) 108 (90 BASE) MCG/ACT Inhaler USE 1 TO 2 INHALATIONS EVERY 4 TO 6 HOURS AS NEEDED 25.5 g 11     cetirizine (ZYRTEC) 10 MG tablet Take 1 tablet by mouth daily. 30 tablet 11     cyclobenzaprine (FLEXERIL) 10 MG tablet 1/2 tab during the day and 1 tab at bedtime as needed for muscle spasms 20 tablet 0     fluticasone (FLONASE) 50 MCG/ACT spray USE 1 TO 2 SPRAYS IN EACH NOSTRIL DAILY 16 g 1     fluticasone-salmeterol (ADVAIR DISKUS) 250-50 MCG/DOSE diskus inhaler Inhale 1 puff into the lungs every 12 hours During asthma exacerbations 1 Inhaler 11     gabapentin  "(NEURONTIN) 300 MG capsule Take 1 capsule (300 mg) by mouth 2 times daily 180 capsule 1     methylPREDNISolone (MEDROL DOSEPAK) 4 MG tablet Follow package instructions 21 tablet 0     montelukast (SINGULAIR) 10 MG tablet TAKE 1 TABLET DAILY 90 tablet 0     order for DME Ankle brace 1 Device 0     order for DME Nebulizer machine Qty #1 1 Units 1     ORDER FOR DME cpap machine and needed tubing and accessories 1 Units 1     pseudoePHEDrine (SUDAFED) 120 MG 12 hr tablet Take 1 tablet (120 mg) by mouth every 12 hours for 5 days 10 tablet 0     sertraline (ZOLOFT) 50 MG tablet Take 0.5 tablets (25 mg) by mouth daily For the first 10 days, then increase to one full tab once daily. 30 tablet 1     SUMAtriptan (IMITREX) 100 MG tablet TAKE AS INSTRUCTED BY YOUR PRESCRIBER 27 tablet 1     TRANSDERM-SCOP, 1.5 MG, 72 hr patch PLACE ONE PATCH ONTO THE SKIN EVERY 72 HOURS. APPLY TO HAIRLESS AREA BEHIND ONE EAR AT LEAST 4 HOURS BEFORE TRAVEL.  REMOVE OLD PATCH 4 patch 1     traZODone (DESYREL) 50 MG tablet Take 2 tablets (100 mg) by mouth nightly as needed for sleep 30 tablet 1     verapamil (CALAN-SR) 240 MG CR tablet TAKE 1 TABLET AT BEDTIME 90 tablet 0     [DISCONTINUED] traZODone (DESYREL) 50 MG tablet Take 1 tablet (50 mg) by mouth nightly as needed for sleep 30 tablet 1     Allergies   Allergen Reactions     Levaquin [Levofloxacin Hemihydrate]      diarrhea       Reviewed and updated as needed this visit by clinical staff  Tobacco  Allergies  Meds  Problems  Med Hx  Surg Hx  Fam Hx  Soc Hx        Reviewed and updated as needed this visit by Provider  Tobacco  Allergies  Meds  Problems  Med Hx  Surg Hx  Fam Hx  Soc Hx          ROS:  Constitutional, HEENT, cardiovascular, pulmonary, GI, , musculoskeletal, neuro, skin, endocrine and psych systems are negative, except as otherwise noted.    OBJECTIVE:     /70  Pulse 86  Temp 98.3  F (36.8  C) (Tympanic)  Ht 5' 6\" (1.676 m)  Wt 199 lb (90.3 kg)  SpO2 " 97%  BMI 32.12 kg/m2  Body mass index is 32.12 kg/(m^2).  GENERAL: healthy, alert and no distress  EYES: Eyes grossly normal to inspection, PERRL and conjunctivae and sclerae normal; EOMI  HENT: right maxillary tenderness to palpation, otherwise nontender; ear canals normal; right TM with tense effusion though non-erythematous, left TM normal, nose and mouth without ulcers or lesions; oropharynx normal without erythema, post-nasal drip, or tonsillar hypertrophy  NECK: no adenopathy, no asymmetry, masses, or scars and thyroid normal to palpation  RESP: lungs clear to auscultation - no rales, rhonchi or wheezes  CV: regular rate and rhythm, normal S1 S2, no S3 or S4, no murmur, click or rub, no peripheral edema and peripheral pulses strong  MS: no gross musculoskeletal defects noted, no edema  SKIN: no suspicious lesions or rashes  PSYCH: mentation appears normal, affect normal/bright    Diagnostic Test Results:  none     ASSESSMENT/PLAN:     Tasneem was seen today for ear problem.    Diagnoses and all orders for this visit:    Otalgia, right  Middle ear effusion, right  -     methylPREDNISolone (MEDROL DOSEPAK) 4 MG tablet; Follow package instructions  -     pseudoePHEDrine (SUDAFED) 120 MG 12 hr tablet; Take 1 tablet (120 mg) by mouth every 12 hours for 5 days    Adjustment insomnia  -     traZODone (DESYREL) 50 MG tablet; Take 2 tablets (100 mg) by mouth nightly as needed for sleep    Follow up within a week if symptoms worsen or are not improving.     Keisha Duong PA-C  [facility]

## 2018-08-01 DIAGNOSIS — S99.912A LEFT ANKLE INJURY, INITIAL ENCOUNTER: ICD-10-CM

## 2018-08-01 DIAGNOSIS — M25.562 CHRONIC PAIN OF LEFT KNEE: ICD-10-CM

## 2018-08-01 DIAGNOSIS — G89.29 CHRONIC PAIN OF LEFT KNEE: ICD-10-CM

## 2018-08-01 RX ORDER — GABAPENTIN 300 MG/1
300 CAPSULE ORAL 2 TIMES DAILY
Qty: 180 CAPSULE | Refills: 1 | Status: SHIPPED | OUTPATIENT
Start: 2018-08-01 | End: 2018-11-27

## 2018-08-01 NOTE — TELEPHONE ENCOUNTER
Requested Prescriptions   Pending Prescriptions Disp Refills     gabapentin (NEURONTIN) 300 MG capsule  Last Written Prescription Date:  10/25/2017  Last Fill Quantity: 180 capsule,  # refills: 1   Last Office Visit: 7/2/2018   Future Office Visit:      180 capsule 1     Sig: Take 1 capsule (300 mg) by mouth 2 times daily    There is no refill protocol information for this order

## 2018-08-01 NOTE — TELEPHONE ENCOUNTER
CSA not on file     Routing refill request to provider for review/approval because:  Drug not on the [facility] refill protocol     Naila Mar RN, BSN  [place] [facility]

## 2018-08-01 NOTE — TELEPHONE ENCOUNTER
Reason for Call:  Medication or medication refill:    Do you use a [facility]?  Name of the pharmacy and phone number for the current request:  [facility] - [phone]    Name of the medication requested: gabapentin    Other request: Pt needs to leave town unexpectedly to see her mother and she will run out while she is there. Please send to [facility] pharmacy     Can we leave a detailed message on this number? YES    Phone number patient can be reached at: Home number on file [phone] (home)    Best Time:     Call taken on 8/1/2018 at 10:50 AM by Cherelle Garcia

## 2018-09-11 ENCOUNTER — TELEPHONE (OUTPATIENT)
Dept: FAMILY MEDICINE | Facility: CLINIC | Age: 63
End: 2018-09-11

## 2018-09-11 DIAGNOSIS — R51.9 NONINTRACTABLE EPISODIC HEADACHE, UNSPECIFIED HEADACHE TYPE: ICD-10-CM

## 2018-09-11 DIAGNOSIS — H90.5 HEARING LOSS, SENSORINEURAL, HIGH FREQUENCY, RIGHT: ICD-10-CM

## 2018-09-11 DIAGNOSIS — D32.9 MENINGIOMA (H): Primary | ICD-10-CM

## 2018-09-11 DIAGNOSIS — H53.9 IMPAIRED VISUAL PERCEPTION: ICD-10-CM

## 2018-09-11 DIAGNOSIS — G43.109 MIGRAINE WITH AURA: ICD-10-CM

## 2018-09-11 DIAGNOSIS — J45.30 MILD PERSISTENT ASTHMA WITHOUT COMPLICATION: ICD-10-CM

## 2018-09-11 DIAGNOSIS — J45.21 INTERMITTENT ASTHMA, WITH ACUTE EXACERBATION: ICD-10-CM

## 2018-09-11 RX ORDER — VERAPAMIL HYDROCHLORIDE 240 MG/1
TABLET, FILM COATED, EXTENDED RELEASE ORAL
Qty: 90 TABLET | Refills: 0 | Status: SHIPPED | OUTPATIENT
Start: 2018-09-11 | End: 2018-12-10

## 2018-09-11 NOTE — TELEPHONE ENCOUNTER
"Requested Prescriptions   Pending Prescriptions Disp Refills     verapamil (CALAN-SR) 240 MG CR tablet [Pharmacy Med Name: VERAPAMIL HCL ER TABS 240MG] 90 tablet 0        Last Written Prescription Date:  6.14.18  Last Fill Quantity: 90,  # refills: 0   Last Office Visit: 4/9/2018   Future Office Visit:      Sig: TAKE 1 TABLET AT BEDTIME    Calcium Channel Blockers Protocol  Passed    9/11/2018 12:04 AM       Passed - Blood pressure under 140/90 in past 12 months    BP Readings from Last 3 Encounters:   07/02/18 118/70   06/25/18 110/64   06/05/18 120/82                Passed - Normal ALT in past 12 months    Recent Labs   Lab Test  03/22/18   1307   ALT  21            Passed - Recent (12 mo) or future (30 days) visit within the authorizing provider's specialty    Patient had office visit in the last 12 months or has a visit in the next 30 days with authorizing provider or within the authorizing provider's specialty.  See \"Patient Info\" tab in inbasket, or \"Choose Columns\" in Meds & Orders section of the refill encounter.           Passed - Patient is age 18 or older       Passed - No active pregnancy on record       Passed - Normal serum creatinine on file in past 12 months    Recent Labs   Lab Test  03/22/18   1307   CR  0.57            Passed - No positive pregnancy test in past 12 months          "

## 2018-09-11 NOTE — TELEPHONE ENCOUNTER
Dr Villanueva patient is calling to get a referral to neurology. Patient had brain surgery 1 year ago at [facility]. She would like to establish care with a neurologist here closer to home. Pending referral. Please advise.    Ileana Rivera  Referral Coordinator

## 2018-09-12 RX ORDER — MONTELUKAST SODIUM 10 MG/1
TABLET ORAL
Qty: 90 TABLET | Refills: 0 | Status: SHIPPED | OUTPATIENT
Start: 2018-09-12 | End: 2018-12-10

## 2018-09-12 NOTE — TELEPHONE ENCOUNTER
Is this in regard to the meningioma? We don't have any records from [place] re: this in the last year or since 2015. Just need to Dx.

## 2018-09-14 NOTE — TELEPHONE ENCOUNTER
Left 2 messages for patient to call with diagnoses. Also told patient she should have her records transferred her from her visits with the specialist. Closing chart till I hear back from patient.    Ileana Rivera  Referral Coordinator

## 2018-09-24 ENCOUNTER — TELEPHONE (OUTPATIENT)
Dept: FAMILY MEDICINE | Facility: CLINIC | Age: 63
End: 2018-09-24

## 2018-09-24 NOTE — TELEPHONE ENCOUNTER
Neurology referral placed for seizure disorder.  Discussed with JACK and she will call patient at the end of the day to discuss.    BARBIE Chamberlain, RN, PHN  [facility]) [phone]

## 2018-09-24 NOTE — TELEPHONE ENCOUNTER
Just received records over the weekend from [place] re: her meningioma and removal.  ? Is pt desiring referrals to both neurosurgery and neurology - or just neurology.   ? Has she had any seizures since her meningioma was removed in 2014?   ?does she want to go to [facility] or [facility] office building?

## 2018-09-24 NOTE — TELEPHONE ENCOUNTER
Attempt # 1    Left non-detailed VM for patient to call back and speak with any triage nurse.  Please advise patient of JV note below and also ask if headaches are new or has she had since meningioma was removed in 2014?    BARBIE Chamberlain, RN, PHN  [place] [facility]

## 2018-09-24 NOTE — TELEPHONE ENCOUNTER
Dr Villanueva last Thursday [facility] faxed over OV notes for Tasneem. I then inner officed them to you that same day Sept 20th. Could you please check to see if you received them. Patient was requesting a referral to Neurology for headaches. She has been doing all her visits at [facility] but wants to be seen here in [facility]. You said you could not do the referral until you had a diagnose. Could you please have you MA call patient she is getting somewhat frustrated she can't get the referral. Patient says she is having headache everyday and needs to be seen. Thanks Patient number is [phone]    Ileana Rivera  Referral Coordinator

## 2018-09-26 PROBLEM — D32.9 MENINGIOMA (H): Status: ACTIVE | Noted: 2018-09-26

## 2018-09-26 NOTE — TELEPHONE ENCOUNTER
Attempt #1.  Chiquis Boland contacted Tasneem on 09/26/18 and left a message. If patient calls back please contact RN team.  Cherelle Boland RN  [facility]

## 2018-09-26 NOTE — TELEPHONE ENCOUNTER
Patient returning call    Advised of MD JACK message below - Patient stated an understanding and agreed with plan.  Patient stated the last MRI Brain she had was 10/12/2017. MRI Brain already ordered - # given for scheduling  Patient stated [facility] called her already, she is scheduled to see them on  11/14/2018      Pamela Martin RN  [place] [facility]

## 2018-09-26 NOTE — TELEPHONE ENCOUNTER
If patient hasn't had an MRI of her brain in the last year, (looks like last one was 10/12/2017) , I recommend rechecking a brain MRI to ensure no recurrence of meningioma . Referral to [facility] of  Neurology - [place] Office.  [facility] docs are located at the [facility] or their [facility], [facility], [facility] or [facility].     Please fax copy of pt's records from [facility] to [facility].  And stat fax/scan into Epic as well.     MRI of Brain ordered. Referral placed. Please inform patient.   Records from [place] at [facility] unit coordinator's in basket /file box.

## 2018-10-17 ENCOUNTER — HOSPITAL ENCOUNTER (OUTPATIENT)
Dept: MRI IMAGING | Facility: CLINIC | Age: 63
Discharge: HOME | End: 2018-10-17
Attending: FAMILY MEDICINE | Admitting: FAMILY MEDICINE
Payer: COMMERCIAL

## 2018-10-17 DIAGNOSIS — H90.5 HEARING LOSS, SENSORINEURAL, HIGH FREQUENCY, RIGHT: ICD-10-CM

## 2018-10-17 DIAGNOSIS — D32.9 MENINGIOMA (H): ICD-10-CM

## 2018-10-17 DIAGNOSIS — H53.9 IMPAIRED VISUAL PERCEPTION: ICD-10-CM

## 2018-10-17 DIAGNOSIS — R51.9 NONINTRACTABLE EPISODIC HEADACHE, UNSPECIFIED HEADACHE TYPE: ICD-10-CM

## 2018-10-17 PROCEDURE — 70553 MRI BRAIN STEM W/O & W/DYE: CPT

## 2018-10-17 PROCEDURE — 25500064 ZZH RX 255 OP 636: Performed by: FAMILY MEDICINE

## 2018-10-17 PROCEDURE — A9585 GADOBUTROL INJECTION: HCPCS | Performed by: FAMILY MEDICINE

## 2018-10-17 RX ORDER — GADOBUTROL 604.72 MG/ML
10 INJECTION INTRAVENOUS ONCE
Status: COMPLETED | OUTPATIENT
Start: 2018-10-17 | End: 2018-10-17

## 2018-10-17 RX ADMIN — GADOBUTROL 9 ML: 604.72 INJECTION INTRAVENOUS at 13:41

## 2018-10-22 ENCOUNTER — TELEPHONE (OUTPATIENT)
Dept: FAMILY MEDICINE | Facility: CLINIC | Age: 63
End: 2018-10-22

## 2018-10-22 NOTE — TELEPHONE ENCOUNTER
See MRI result note.  Results discussed with patient.    Noemi Villanueva, BARBIE, RN, N  [facility]) [phone]

## 2018-10-22 NOTE — PROGRESS NOTES
Please call pt with MRI brain  results below:     IMPRESSION:      1. No evidence of recurrent tumor.  2. Right lateral occipital craniotomy from previous surgery to remove the meningioma.   3. Minimal white matter T2 hyperintensities from migraines probably and mild brain atrophy secondary to age are  Unchanged.    Recommend following up with neurology if decreased depth perception and headaches are continuing.      For additional lab test information, labtestsonline.org is an excellent reference.

## 2018-10-26 ENCOUNTER — OFFICE VISIT (OUTPATIENT)
Dept: FAMILY MEDICINE | Facility: CLINIC | Age: 63
End: 2018-10-26
Payer: COMMERCIAL

## 2018-10-26 ENCOUNTER — RADIANT APPOINTMENT (OUTPATIENT)
Dept: GENERAL RADIOLOGY | Facility: CLINIC | Age: 63
End: 2018-10-26
Attending: FAMILY MEDICINE
Payer: COMMERCIAL

## 2018-10-26 VITALS
SYSTOLIC BLOOD PRESSURE: 102 MMHG | WEIGHT: 198 LBS | BODY MASS INDEX: 31.82 KG/M2 | OXYGEN SATURATION: 95 % | HEART RATE: 69 BPM | TEMPERATURE: 98.2 F | HEIGHT: 66 IN | DIASTOLIC BLOOD PRESSURE: 72 MMHG

## 2018-10-26 DIAGNOSIS — M25.562 LEFT KNEE PAIN: ICD-10-CM

## 2018-10-26 DIAGNOSIS — M25.562 ACUTE PAIN OF LEFT KNEE: Primary | ICD-10-CM

## 2018-10-26 LAB
CRP SERPL-MCNC: <2.9 MG/L (ref 0–8)
ERYTHROCYTE [SEDIMENTATION RATE] IN BLOOD BY WESTERGREN METHOD: 10 MM/H (ref 0–30)

## 2018-10-26 PROCEDURE — 86618 LYME DISEASE ANTIBODY: CPT | Performed by: FAMILY MEDICINE

## 2018-10-26 PROCEDURE — 73560 X-RAY EXAM OF KNEE 1 OR 2: CPT | Mod: LT

## 2018-10-26 PROCEDURE — 86140 C-REACTIVE PROTEIN: CPT | Performed by: FAMILY MEDICINE

## 2018-10-26 PROCEDURE — 73565 X-RAY EXAM OF KNEES: CPT | Mod: FY

## 2018-10-26 PROCEDURE — 36415 COLL VENOUS BLD VENIPUNCTURE: CPT | Performed by: FAMILY MEDICINE

## 2018-10-26 PROCEDURE — 85652 RBC SED RATE AUTOMATED: CPT | Performed by: FAMILY MEDICINE

## 2018-10-26 PROCEDURE — 99214 OFFICE O/P EST MOD 30 MIN: CPT | Performed by: FAMILY MEDICINE

## 2018-10-26 NOTE — MR AVS SNAPSHOT
After Visit Summary   10/26/2018    Tasneem Chan    MRN: [id number]           Patient Information     Date Of Birth          1955        Visit Information        Provider Department      10/26/2018 11:00 AM Zayra Villanueva MD [facility]        Today's Diagnoses     Acute pain of left knee    -  1      Care Instructions                    Patellofemoral Pain Syndrome (Runner's Knee)             What is patellofemoral pain syndrome?   Patellofemoral pain syndrome is pain behind the kneecap. It may also be called patellofemoral disorder, patellar malalignment, runner's knee, and chondromalacia.   How does it occur?   Patellofemoral pain syndrome can occur from overuse of the knee in sports and activities such as running, walking, jumping, or bicycling.   The kneecap (patella) is attached to the large group of muscles in the thigh called the quadriceps. It is also attached to the shin bone by the patellar tendon. The kneecap fits into grooves in the end of the thigh bone (femur) called the femoral condyle. With repeated bending and straightening of the knee, you can irritate the inside surface of the kneecap and cause pain.   Patellofemoral pain syndrome also may result from the way your hips, legs, knees, or feet are aligned. For example, if you have wide hips or underdeveloped thigh muscles, or if you are knock-kneed You may also have this problem if your foot flattens too much when you walk or run (a condition called over-pronation).   What are the symptoms?   The main symptom is pain behind the kneecap. You may have pain when you walk, run, or sit for a long time. The pain is usually worse when you walk downhill or down stairs. Your knee may swell at times. You may feel or hear snapping, popping, or grinding in the knee.   How is it diagnosed?   Your healthcare provider will review your symptoms and examine your knee. You will have knee X-rays. You may have an MRI  to check for damage to the surface of the patella or femur or another injury.   How is it treated?   Treatment includes the following:   Put an ice pack, gel pack, or package of frozen vegetables, wrapped in a cloth on the area every 3 to 4 hours, for up to 20 minutes at a time.   Raise the knee on a pillow when you sit or lie down.   Take an anti-inflammatory medicine such as ibuprofen, or other medicine as directed by your provider. Nonsteroidal anti-inflammatory medicines (NSAIDs) may cause stomach bleeding and other problems. These risks increase with age. Read the label and take as directed. Unless recommended by your healthcare provider, do not take for more than 10 days.   Follow your provider's instructions for doing exercises to help you recover. Your healthcare provider will show you exercises to help decrease the pain behind your kneecap.   If you over-pronate, your healthcare provider may recommend shoe inserts, called orthotics. You can buy orthotics at a pharmacy or athletic shoe store or they can be custom-made.   Use an infrapatellar strap, a strap placed below the kneecap over the patellar tendon.   Wear a neoprene knee sleeve, which will give support to your knee and patella.   While you recover from your injury, you will need to change your sport or activity to one that does not make your condition worse. For example, you may need to bicycle or swim instead of run.   In cases of severe patellofemoral pain syndrome, surgery may be recommended.   How long will the effects last?   Patellofemoral pain often lasts a long time and can come back after symptoms were better for a while. Treatment requires proper rehabilitation exercises that are done regularly.   When can I return to my normal activities?   Everyone recovers from an injury at a different rate. Return to your activities depends on how soon your knee recovers, not by how many days or weeks it has been since your injury has occurred. In  general, the longer you have symptoms before you start treatment, the longer it will take to get better. The goal is to return you to your normal activities as soon as is safely possible. If you return too soon you may worsen your injury.   You may safely return to your normal activities when, starting from the top of the list and progressing to the end, each of the following is true:   Your injured knee can be fully straightened and bent without pain.   Your knee and leg have regained normal strength compared to the uninjured knee and leg.   You are able to walk, bend, and squat without pain.   How can I prevent runner's knee?   Runner's knee can best be prevented by strengthening your thigh muscles, particularly the inside part of this muscle group. It is also important to wear shoes that fit well and that have good arch supports.     Published by [employer].  This content is reviewed periodically and is subject to change as new health information becomes available. The information is intended to inform and educate and is not a replacement for medical evaluation, advice, diagnosis or treatment by a healthcare professional.   Written by Kin Akers MD, for [employer]   ? 2010 [employer] and/or its affiliates. All Rights Reserved.   Copyright   [employer] Reference Systems 2011  Adult Health Advisor    Patellofemoral Pain Syndrome (Runner's Knee) Rehabilitation Exercises              You can do the hamstring stretch right away. When the pain in your knee has decreased, you can do the quadriceps stretch and start strengthening the thigh muscles using the rest of the exercises.   Standing hamstring stretch: Put the heel of the leg on your injured side on a stool about 15 inches high. Keep your leg straight. Lean forward, bending at the hips, until you feel a mild stretch in the back of your thigh. Make sure you don't roll your shoulders or bend at the waist when doing this or you will stretch your lower back  instead of your leg. Hold the stretch for 15 to 30 seconds. Repeat 3 times.   Quadriceps stretch: Stand an arm's length away from the wall with your injured leg farthest from the wall. Facing straight ahead, brace yourself by keeping one hand against the wall. With your other hand, grasp the ankle of your injured leg and pull your heel toward your buttocks. Don't arch or twist your back. Keep your knees together. Hold this stretch for 15 to 30 seconds.   Side-lying leg lift: Lie on your uninjured side. Tighten the front thigh muscles on your injured leg and lift that leg 8 to 10 inches away from the other leg. Keep the leg straight and lower it slowly. Do 3 sets of 10.   Quad sets: Sit on the floor with your injured leg straight and your other leg bent. Press the back of the knee of your injured leg against the floor by tightening the muscles on the top of your thigh. Hold this position 10 seconds. Relax. Do 3 sets of 10.   Straight leg raise: Lie on your back with your legs straight out in front of you. Bend the knee on your uninjured side and place the foot flat on the floor. Tighten the thigh muscle on your injured side and lift your leg about 8 inches off the floor. Keep your leg straight and your thigh muscle tight. Slowly lower your leg back down to the floor. Do 3 sets of 10.   Step-up: Stand with the foot of your injured leg on a support 3 to 5 inches high (like a small step or block of wood). Keep your other foot flat on the floor. Shift your weight onto the injured leg on the support. Straighten your injured leg as the other leg comes off the floor. Return to the starting position by bending your injured leg and slowly lowering your uninjured leg back to the floor. Do 3 sets of 10.   Wall squat with a ball: Stand with your back, shoulders, and head against a wall. Look straight ahead. Keep your shoulders relaxed and your feet 3 feet from the wall and shoulder's width apart. Place a soccer or  basketball-sized ball behind your back. Keeping your back against the wall, slowly squat down to a 45-degree angle. Your thighs will not yet be parallel to the floor. Hold this position for 10 seconds and then slowly slide back up the wall. Repeat 10 times. Build up to 3 sets of 10.   Knee stabilization: Wrap a piece of elastic tubing around the ankle of the uninjured leg. Tie a knot in the other end of the tubing and close it in a door.   0. Stand facing the door on the leg without tubing and bend your knee slightly, keeping your thigh muscles tight. While maintaining this position, move the leg with the tubing straight back behind you. Do 3 sets of 10.   0. Turn 90 degrees so the leg without tubing is closest to the door. Move the leg with tubing away from your body. Do 3 sets of 10.   0. Turn 90 degrees again so your back is to the door. Move the leg with tubing straight out in front of you. Do 3 sets of 10.   0. Turn your body 90 degrees again so the leg with tubing is closest to the door. Move the leg with tubing across your body. Do 3 sets of 10.   Hold onto a chair if you need help balancing. This exercise can be made even more challenging by standing on a pillow while you move the leg with tubing.   Resisted terminal knee extension: Make a loop from a piece of elastic tubing by tying a knot in both ends. Close both knots in a door. Step into the loop so the tubing is around the back of your injured leg. Lift the other foot off the ground. Hold onto a chair for balance, if needed. Bend the knee on the leg with tubing about 45 degrees. Slowly straighten your leg, keeping your thigh muscle tight as you do this. Do this 10 times. Do 3 sets. An easier way to do this is to stand on both legs for better support while you do the exercise.   Standing calf stretch: Stand facing a wall with your hands on the wall at about eye level. Keep your injured leg back with your heel on the floor. Keep the other leg forward with  the knee bent. Turn your back foot slightly inward (as if you were pigeon-toed). Slowly lean into the wall until you feel a stretch in the back of your calf. Hold the stretch for 15 to 30 seconds. Return to the starting position. Repeat 3 times. Do this exercise several times each day.   Clam exercise: Lie on your uninjured side with your hips and knees bent and feet together. Slowly raise your top leg toward the ceiling while keeping your heels touching each other. Hold for 2 seconds and lower slowly. Do 3 sets of 10 repetitions.   Iliotibial band stretch: Side-bending: Cross one leg in front of the other leg and lean in the opposite direction from the front leg. Reach the arm on the side of the back leg over your head while you do this. Hold this position for 15 to 30 seconds. Return to the starting position. Repeat 3 times and then switch legs and repeat the exercise.   Published by [employer].  This content is reviewed periodically and is subject to change as new health information becomes available. The information is intended to inform and educate and is not a replacement for medical evaluation, advice, diagnosis or treatment by a healthcare professional.   Written by Christel Torre, MS, PT, and Micaela Uriarte PT, [facility], [facility] [place], for [facility].   ? 2010 [facility] and/or its affiliates. All Rights Reserved.   Copyright   Clinical Reference Systems 2011  Adult Health Advisor                                      Thank you for choosing [facility]  for your Health Care. It was a pleasure seeing you at your visit today. Please contact us with any questions or concerns you may have.                   Zayra Villanueva MD                                  To reach your [facility] care team after hours call:   [phone]    Our clinic hours are:     Monday- 7:30 am - 7:00 pm                             Tuesday through Friday- 7:30 am - 5:00 pm                                         Saturday- 8:00 am - 12:00 pm                  Phone:  [phone]    Our pharmacy hours are:     Monday  8:00 am to 7:00 pm      Tuesday through Friday 8:00am to 6:00pm                        Saturday - 9:00 am to 1:00 pm      Sunday : Closed.              Phone:  [phone]      There is also information available at our web site:  [email or web address]    If your provider ordered any lab tests and you do not receive the results within 10 business days, please call the clinic.    If you need a medication refill please contact your pharmacy.  Please allow 2 business days for your refill to be completed.    Our clinic offers telephone visits and e visits.  Please ask one of your team members to explain more.      Use [employer] (secure email communication and access to your chart) to send your primary care provider a message or make an appointment. Ask someone on your Team how to sign up for [employer].                       Follow-ups after your visit        Additional Services     ORTHO  REFERRAL       [facility] is referring you to the Orthopedic  Services at [facility].       The  Representative will assist you in the coordination of your Orthopedic and Musculoskeletal Care as prescribed by your physician.    The  Representative will call you within 1 business day to help schedule your appointment, or you may contact the  Representative at:    All areas ~ [phone]     Type of Referral : Non Surgical / Sport Medicine       Timeframe requested: 3 - 5 days    Coverage of these services is subject to the terms and limitations of your health insurance plan.  Please call member services at your health plan with any benefit or coverage questions.      If X-rays, CT or MRI's have been performed, please contact the facility where they were done to arrange for , prior to your scheduled appointment.  Please bring this referral  "request to your appointment and present it to your specialist.                  Who to contact     If you have questions or need follow up information about today's clinic visit or your schedule please contact [facility] directly at [phone].  Normal or non-critical lab and imaging results will be communicated to you by MyChart, letter or phone within 4 business days after the clinic has received the results. If you do not hear from us within 7 days, please contact the clinic through MyChart or phone. If you have a critical or abnormal lab result, we will notify you by phone as soon as possible.  Submit refill requests through [employer] or call your pharmacy and they will forward the refill request to us. Please allow 3 business days for your refill to be completed.          Additional Information About Your Visit        Twitt[employer]goharStayTuned Information     [employer] lets you send messages to your doctor, view your test results, renew your prescriptions, schedule appointments and more. To sign up, go to [email or web address] . Click on \"Log in\" on the left side of the screen, which will take you to the Welcome page. Then click on \"Sign up Now\" on the right side of the page.     You will be asked to enter the access code listed below, as well as some personal information. Please follow the directions to create your username and password.     Your access code is: [email or web address]  Expires: 2019 12:12 PM     Your access code will  in 90 days. If you need help or a new code, please call your [facility] clinic or [phone].        Care EveryWhere ID     This is your Care EveryWhere ID. This could be used by other organizations to access your [facility] medical records  [id number]        Your Vitals Were     Pulse Temperature Height Pulse Oximetry BMI (Body Mass Index)       69 98.2  F (36.8  C) (Oral) 5' 6\" (1.676 m) 95% 31.96 kg/m2        Blood Pressure from Last 3 Encounters:   10/26/18 102/72   18 " 118/70   06/25/18 110/64    Weight from Last 3 Encounters:   10/26/18 198 lb (89.8 kg)   07/02/18 199 lb (90.3 kg)   06/25/18 198 lb (89.8 kg)              We Performed the Following     CRP inflammation     Erythrocyte sedimentation rate auto     Lyme Disease Porsha with reflex to WB Serum     ORTHO  REFERRAL          Today's Medication Changes          These changes are accurate as of 10/26/18 12:12 PM.  If you have any questions, ask your nurse or doctor.               Stop taking these medicines if you haven't already. Please contact your care team if you have questions.     methylPREDNISolone 4 MG tablet   Commonly known as:  MEDROL DOSEPAK   Stopped by:  Zayra Villanueva MD           order for DME   Stopped by:  Zayra Villanueva MD           order for DME   Stopped by:  Zayra Villanueva MD           order for DME   Stopped by:  Zayra Villanueva MD           sertraline 50 MG tablet   Commonly known as:  ZOLOFT   Stopped by:  Zayra Villanueva MD           TRANSDERM-SCOP (1.5 MG) 72 hr patch   Generic drug:  scopolamine   Stopped by:  Zayra Villanueva MD           traZODone 50 MG tablet   Commonly known as:  DESYREL   Stopped by:  Zayra Villanueva MD                    Primary Care Provider Office Phone # Fax #    Zayra Villanueva [phone]       [address] [place] [address]        Equal Access to Services     [facility] AH: Hadii aad ku hadasho Sojeannetteali, waaxda luqadaha, qaybta kaalmada adeegyada, waxay elizabeth mckenna'martir . So [facility] [phone].    ATENCIÓN: Si habla español, tiene a curtis disposición servicios gratuitos de asistencia lingüística. Llame al [phone].    We comply with applicable federal civil rights laws and [place] laws. We do not discriminate on the basis of race, color, national origin, age, disability, sex, sexual orientation, or gender identity.            Thank you!     Thank you  for choosing [facility]  for your care. Our goal is always to provide you with excellent care. Hearing back from our patients is one way we can continue to improve our services. Please take a few minutes to complete the written survey that you may receive in the mail after your visit with us. Thank you!             Your Updated Medication List - Protect others around you: Learn how to safely use, store and throw away your medicines at www.disposemymeds.org.          This list is accurate as of 10/26/18 12:12 PM.  Always use your most recent med list.                   Brand Name Dispense Instructions for use Diagnosis    * albuterol (2.5 MG/3ML) 0.083% neb solution     25 vial    Take 1 vial (2.5 mg) by nebulization every 6 hours as needed for shortness of breath / dyspnea or wheezing    Mild intermittent asthma with acute exacerbation       * albuterol 108 (90 Base) MCG/ACT inhaler    PROAIR HFA    25.5 g    USE 1 TO 2 INHALATIONS EVERY 4 TO 6 HOURS AS NEEDED    Mild intermittent asthma with acute exacerbation       cetirizine 10 MG tablet    ZYRTEC    30 tablet    Take 1 tablet by mouth daily.    Rash       cyclobenzaprine 10 MG tablet    FLEXERIL    20 tablet    1/2 tab during the day and 1 tab at bedtime as needed for muscle spasms    Acute midline thoracic back pain       fluticasone 50 MCG/ACT spray    FLONASE    16 g    USE 1 TO 2 SPRAYS IN EACH NOSTRIL DAILY    Allergic rhinitis, unspecified chronicity, unspecified seasonality, unspecified trigger       fluticasone-salmeterol 250-50 MCG/DOSE diskus inhaler    ADVAIR DISKUS    1 Inhaler    Inhale 1 puff into the lungs every 12 hours During asthma exacerbations    Mild intermittent asthma with acute exacerbation       gabapentin 300 MG capsule    NEURONTIN    180 capsule    Take 1 capsule (300 mg) by mouth 2 times daily    Left ankle injury, initial encounter, Chronic pain of left knee       montelukast 10 MG tablet    SINGULAIR    90 tablet     TAKE 1 TABLET DAILY    Mild persistent asthma without complication, Intermittent asthma, with acute exacerbation       SUMAtriptan 100 MG tablet    IMITREX    27 tablet    TAKE AS INSTRUCTED BY YOUR PRESCRIBER    Migraine with aura       verapamil 240 MG CR tablet    CALAN-SR    90 tablet    TAKE 1 TABLET AT BEDTIME    Migraine with aura       * Notice:  This list has 2 medication(s) that are the same as other medications prescribed for you. Read the directions carefully, and ask your doctor or other care provider to review them with you.

## 2018-10-26 NOTE — PATIENT INSTRUCTIONS
Patellofemoral Pain Syndrome (Runner's Knee)             What is patellofemoral pain syndrome?   Patellofemoral pain syndrome is pain behind the kneecap. It may also be called patellofemoral disorder, patellar malalignment, runner's knee, and chondromalacia.   How does it occur?   Patellofemoral pain syndrome can occur from overuse of the knee in sports and activities such as running, walking, jumping, or bicycling.   The kneecap (patella) is attached to the large group of muscles in the thigh called the quadriceps. It is also attached to the shin bone by the patellar tendon. The kneecap fits into grooves in the end of the thigh bone (femur) called the femoral condyle. With repeated bending and straightening of the knee, you can irritate the inside surface of the kneecap and cause pain.   Patellofemoral pain syndrome also may result from the way your hips, legs, knees, or feet are aligned. For example, if you have wide hips or underdeveloped thigh muscles, or if you are knock-kneed You may also have this problem if your foot flattens too much when you walk or run (a condition called over-pronation).   What are the symptoms?   The main symptom is pain behind the kneecap. You may have pain when you walk, run, or sit for a long time. The pain is usually worse when you walk downhill or down stairs. Your knee may swell at times. You may feel or hear snapping, popping, or grinding in the knee.   How is it diagnosed?   Your healthcare provider will review your symptoms and examine your knee. You will have knee X-rays. You may have an MRI to check for damage to the surface of the patella or femur or another injury.   How is it treated?   Treatment includes the following:   Put an ice pack, gel pack, or package of frozen vegetables, wrapped in a cloth on the area every 3 to 4 hours, for up to 20 minutes at a time.   Raise the knee on a pillow when you sit or lie down.   Take an anti-inflammatory medicine such  as ibuprofen, or other medicine as directed by your provider. Nonsteroidal anti-inflammatory medicines (NSAIDs) may cause stomach bleeding and other problems. These risks increase with age. Read the label and take as directed. Unless recommended by your healthcare provider, do not take for more than 10 days.   Follow your provider's instructions for doing exercises to help you recover. Your healthcare provider will show you exercises to help decrease the pain behind your kneecap.   If you over-pronate, your healthcare provider may recommend shoe inserts, called orthotics. You can buy orthotics at a pharmacy or athletic shoe store or they can be custom-made.   Use an infrapatellar strap, a strap placed below the kneecap over the patellar tendon.   Wear a neoprene knee sleeve, which will give support to your knee and patella.   While you recover from your injury, you will need to change your sport or activity to one that does not make your condition worse. For example, you may need to bicycle or swim instead of run.   In cases of severe patellofemoral pain syndrome, surgery may be recommended.   How long will the effects last?   Patellofemoral pain often lasts a long time and can come back after symptoms were better for a while. Treatment requires proper rehabilitation exercises that are done regularly.   When can I return to my normal activities?   Everyone recovers from an injury at a different rate. Return to your activities depends on how soon your knee recovers, not by how many days or weeks it has been since your injury has occurred. In general, the longer you have symptoms before you start treatment, the longer it will take to get better. The goal is to return you to your normal activities as soon as is safely possible. If you return too soon you may worsen your injury.   You may safely return to your normal activities when, starting from the top of the list and progressing to the end, each of the following is  true:   Your injured knee can be fully straightened and bent without pain.   Your knee and leg have regained normal strength compared to the uninjured knee and leg.   You are able to walk, bend, and squat without pain.   How can I prevent runner's knee?   Runner's knee can best be prevented by strengthening your thigh muscles, particularly the inside part of this muscle group. It is also important to wear shoes that fit well and that have good arch supports.     Published by [employer].  This content is reviewed periodically and is subject to change as new health information becomes available. The information is intended to inform and educate and is not a replacement for medical evaluation, advice, diagnosis or treatment by a healthcare professional.   Written by Kin Akers MD, for [employer]   ? 2010 [facility] and/or its affiliates. All Rights Reserved.   Copyright   [employer] Reference Systems 2011  Adult Health Advisor    Patellofemoral Pain Syndrome (Runner's Knee) Rehabilitation Exercises              You can do the hamstring stretch right away. When the pain in your knee has decreased, you can do the quadriceps stretch and start strengthening the thigh muscles using the rest of the exercises.   Standing hamstring stretch: Put the heel of the leg on your injured side on a stool about 15 inches high. Keep your leg straight. Lean forward, bending at the hips, until you feel a mild stretch in the back of your thigh. Make sure you don't roll your shoulders or bend at the waist when doing this or you will stretch your lower back instead of your leg. Hold the stretch for 15 to 30 seconds. Repeat 3 times.   Quadriceps stretch: Stand an arm's length away from the wall with your injured leg farthest from the wall. Facing straight ahead, brace yourself by keeping one hand against the wall. With your other hand, grasp the ankle of your injured leg and pull your heel toward your buttocks. Don't arch or twist your  back. Keep your knees together. Hold this stretch for 15 to 30 seconds.   Side-lying leg lift: Lie on your uninjured side. Tighten the front thigh muscles on your injured leg and lift that leg 8 to 10 inches away from the other leg. Keep the leg straight and lower it slowly. Do 3 sets of 10.   Quad sets: Sit on the floor with your injured leg straight and your other leg bent. Press the back of the knee of your injured leg against the floor by tightening the muscles on the top of your thigh. Hold this position 10 seconds. Relax. Do 3 sets of 10.   Straight leg raise: Lie on your back with your legs straight out in front of you. Bend the knee on your uninjured side and place the foot flat on the floor. Tighten the thigh muscle on your injured side and lift your leg about 8 inches off the floor. Keep your leg straight and your thigh muscle tight. Slowly lower your leg back down to the floor. Do 3 sets of 10.   Step-up: Stand with the foot of your injured leg on a support 3 to 5 inches high (like a small step or block of wood). Keep your other foot flat on the floor. Shift your weight onto the injured leg on the support. Straighten your injured leg as the other leg comes off the floor. Return to the starting position by bending your injured leg and slowly lowering your uninjured leg back to the floor. Do 3 sets of 10.   Wall squat with a ball: Stand with your back, shoulders, and head against a wall. Look straight ahead. Keep your shoulders relaxed and your feet 3 feet from the wall and shoulder's width apart. Place a soccer or basketball-sized ball behind your back. Keeping your back against the wall, slowly squat down to a 45-degree angle. Your thighs will not yet be parallel to the floor. Hold this position for 10 seconds and then slowly slide back up the wall. Repeat 10 times. Build up to 3 sets of 10.   Knee stabilization: Wrap a piece of elastic tubing around the ankle of the uninjured leg. Tie a knot in the other  end of the tubing and close it in a door.   0. Stand facing the door on the leg without tubing and bend your knee slightly, keeping your thigh muscles tight. While maintaining this position, move the leg with the tubing straight back behind you. Do 3 sets of 10.   0. Turn 90 degrees so the leg without tubing is closest to the door. Move the leg with tubing away from your body. Do 3 sets of 10.   0. Turn 90 degrees again so your back is to the door. Move the leg with tubing straight out in front of you. Do 3 sets of 10.   0. Turn your body 90 degrees again so the leg with tubing is closest to the door. Move the leg with tubing across your body. Do 3 sets of 10.   Hold onto a chair if you need help balancing. This exercise can be made even more challenging by standing on a pillow while you move the leg with tubing.   Resisted terminal knee extension: Make a loop from a piece of elastic tubing by tying a knot in both ends. Close both knots in a door. Step into the loop so the tubing is around the back of your injured leg. Lift the other foot off the ground. Hold onto a chair for balance, if needed. Bend the knee on the leg with tubing about 45 degrees. Slowly straighten your leg, keeping your thigh muscle tight as you do this. Do this 10 times. Do 3 sets. An easier way to do this is to stand on both legs for better support while you do the exercise.   Standing calf stretch: Stand facing a wall with your hands on the wall at about eye level. Keep your injured leg back with your heel on the floor. Keep the other leg forward with the knee bent. Turn your back foot slightly inward (as if you were pigeon-toed). Slowly lean into the wall until you feel a stretch in the back of your calf. Hold the stretch for 15 to 30 seconds. Return to the starting position. Repeat 3 times. Do this exercise several times each day.   Clam exercise: Lie on your uninjured side with your hips and knees bent and feet together. Slowly raise your  top leg toward the ceiling while keeping your heels touching each other. Hold for 2 seconds and lower slowly. Do 3 sets of 10 repetitions.   Iliotibial band stretch: Side-bending: Cross one leg in front of the other leg and lean in the opposite direction from the front leg. Reach the arm on the side of the back leg over your head while you do this. Hold this position for 15 to 30 seconds. Return to the starting position. Repeat 3 times and then switch legs and repeat the exercise.   Published by [employer].  This content is reviewed periodically and is subject to change as new health information becomes available. The information is intended to inform and educate and is not a replacement for medical evaluation, advice, diagnosis or treatment by a healthcare professional.   Written by Christel Torre MS, PT, and Micaela Uriarte PT, [facility], [facility], for [facility].   ? 2010 [facility] and/or its affiliates. All Rights Reserved.   Copyright   Clinical Reference Systems 2011  Adult Health Advisor                                      Thank you for choosing [facility]  for your Health Care. It was a pleasure seeing you at your visit today. Please contact us with any questions or concerns you may have.                   Zayra Villanueva MD                                  To reach your [facility] care team after hours call:   [phone]    Our clinic hours are:     Monday- 7:30 am - 7:00 pm                             Tuesday through Friday- 7:30 am - 5:00 pm                                        Saturday- 8:00 am - 12:00 pm                  Phone:  [phone]    Our pharmacy hours are:     Monday  8:00 am to 7:00 pm      Tuesday through Friday 8:00am to 6:00pm                        Saturday - 9:00 am to 1:00 pm      Sunday : Closed.              Phone:  [phone]      There is also information available at our web site:  [email or web address]    If your provider ordered any lab  tests and you do not receive the results within 10 business days, please call the clinic.    If you need a medication refill please contact your pharmacy.  Please allow 2 business days for your refill to be completed.    Our clinic offers telephone visits and e visits.  Please ask one of your team members to explain more.      Use [employer] (secure email communication and access to your chart) to send your primary care provider a message or make an appointment. Ask someone on your Team how to sign up for [employer].

## 2018-10-26 NOTE — LETTER
[address] [place] [address]                  [phone]   November 7, 2018    Tasneem Chan  [address]  [facility] [address]      Dear Tasneem,    Here is a summary of your recent test results:    All of your labs are normal.   C-reactive protein and ESR  - sed rate -  generalized indicators of inflammation in the body - were  normal/negative.     Your Lyme disease test was normal.    Your test results are enclosed.    Please contact me if you have any questions.    In addition, here is a list of due or overdue Health Maintenance reminders.    Health Maintenance Due   Topic Date Due     HIV SCREEN (SYSTEM ASSIGNED)  09/13/1975     Hepatitis C Screening  09/13/1975     Wellness Visit with your Primary Provider - yearly  09/26/2015     Tetanus Vaccine - every 10 years  01/01/2017     Pap Smear - every 3 years  09/26/2017     Flu Vaccine (1) 09/01/2018     Asthma Control Test - every 6 months  09/22/2018     Asthma Action Plan - yearly  10/11/2018     Please call us at [phone] (or use [employer]) to address the above recommendations.        Thank you very much for trusting [facility]..     Healthy regards,       Zayra Villanueva M.D.          Results for orders placed or performed in visit on 10/26/18   Lyme Disease Porsha with reflex to WB Serum   Result Value Ref Range    Lyme Disease Antibodies Serum 0.06 0.00 - 0.89   Erythrocyte sedimentation rate auto   Result Value Ref Range    Sed Rate 10 0 - 30 mm/h   CRP inflammation   Result Value Ref Range    CRP Inflammation <2.9 0.0 - 8.0 mg/L

## 2018-10-26 NOTE — PROGRESS NOTES
SUBJECTIVE:                                                    Tasneem Chan is a 63 year old female who presents to clinic today for the following health issues:    Knee Pain- pain with any movement- flexion or extension. No known injury at all.     Onset: 2 day(s) ago     Description:   Left knee  Location: entire knee  Character: Dull ache  Any injury: no  Did pain occur at time of injury: not applicable  Was activity continued after injury: not applicable  Able to bear weight immediately after injury: not applicable    Accompanying Signs and Symptoms:         Swelling: no        Popping: no        Locking: no        Does knee feel like it is going to give out: YES        Redness or warmth: no    History:          Any previous injury to knee(s): no        Any other joint pain: YES-left  Hip only , only if she lays in bed on that side  Any previous knee surgery: no  Any previous MRI or X-ray: none    Therapies tried and outcome: ice with no relief    Saw orthopedics for her left ankle - has some shifting of bones.   Weight bearing doesn't hurt. Had a bunch of itchy small bug bites on her feet while she was in [place]. Had another bite on her left upper inner thigh same time was at the  [facility]. Stayed in her 's sister's home.     Patient Active Problem List   Diagnosis     Malignant neoplasm of female breast (H)- in 2003 - right breast -s/p lumpectomy with radiation, tamoxifen     Intractable chronic migraine without aura and without status migrainosus     Irritable bowel syndrome     GERD (GASTROESOPHAGEAL REFLUX DISEASE)- much improved with diet     Intermittent asthma     Mild persistent asthma     Hearing loss, sensorineural, high frequency, right- from right 8th cranial nerve tumor (meningioma)     Left ankle injury, initial encounter     Left knee pain     Advanced directives, counseling/discussion     Benign neoplasm of cranial nerve (H)     Blood pressure elevated without history of  HTN     New daily persistent headache     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 distance      Meningioma (H)- s/p removal /craniotomy at [facility] in 2014        Current Outpatient Prescriptions   Medication Sig Dispense Refill     albuterol (2.5 MG/3ML) 0.083% neb solution Take 1 vial (2.5 mg) by nebulization every 6 hours as needed for shortness of breath / dyspnea or wheezing 25 vial 11     albuterol (PROAIR HFA) 108 (90 BASE) MCG/ACT Inhaler USE 1 TO 2 INHALATIONS EVERY 4 TO 6 HOURS AS NEEDED 25.5 g 11     cetirizine (ZYRTEC) 10 MG tablet Take 1 tablet by mouth daily. 30 tablet 11     cyclobenzaprine (FLEXERIL) 10 MG tablet 1/2 tab during the day and 1 tab at bedtime as needed for muscle spasms 20 tablet 0     fluticasone (FLONASE) 50 MCG/ACT spray USE 1 TO 2 SPRAYS IN EACH NOSTRIL DAILY 16 g 1     fluticasone-salmeterol (ADVAIR DISKUS) 250-50 MCG/DOSE diskus inhaler Inhale 1 puff into the lungs every 12 hours During asthma exacerbations 1 Inhaler 11     gabapentin (NEURONTIN) 300 MG capsule Take 1 capsule (300 mg) by mouth 2 times daily 180 capsule 1     montelukast (SINGULAIR) 10 MG tablet TAKE 1 TABLET DAILY 90 tablet 0     SUMAtriptan (IMITREX) 100 MG tablet TAKE AS INSTRUCTED BY YOUR PRESCRIBER 27 tablet 1     verapamil (CALAN-SR) 240 MG CR tablet TAKE 1 TABLET AT BEDTIME 90 tablet 0          Allergies   Allergen Reactions     Levaquin [Levofloxacin Hemihydrate]      diarrhea            Problem list and histories reviewed & adjusted, as indicated.  Additional history: as documented    Reviewed and updated as needed this visit by clinical staff  Tobacco  Allergies  Meds  Med Hx  Surg Hx  Fam Hx  Soc Hx      Reviewed and updated as needed this visit by Provider         ROS:feels most comfortable sitting upright with knees at 90 degrees.    ROS: 12 point ROS neg other than the symptoms noted  "above    OBJECTIVE:                                                    /72 (BP Location: Left arm, Patient Position: Chair, Cuff Size: Adult Large)  Pulse 69  Temp 98.2  F (36.8  C) (Oral)  Ht 5' 6\" (1.676 m)  Wt 198 lb (89.8 kg)  SpO2 95%  BMI 31.96 kg/m2  Body mass index is 31.96 kg/(m^2).   GENERAL: obese , alert, well nourished, well hydrated, no distress  HENT: ear canals- normal; TMs- normal; Nose- normal; Mouth- no ulcers, no lesions  NECK: no tenderness, no adenopathy, no asymmetry, no masses, no stiffness; thyroid- normal to palpation  RESP: lungs clear to auscultation - no rales, no rhonchi, no wheezes  CV: regular rates and rhythm, normal S1 S2, no S3 or S4 and no murmur, no click or rub -  ABDOMEN: soft, no tenderness, no  hepatosplenomegaly, no masses, normal bowel sounds  MS: extremities- no gross deformities noted, no edema  Knees reveal  Near full range of motion, but has  Tenderness/pain with flexion and extension , no definite masses, effusion or ligamentous instability.     Diagnostic test results:  Very mild bilateral medial compartment joint narrowing.      ASSESSMENT/PLAN:                                                        ICD-10-CM    1. Acute pain of left knee M25.562 Lyme Disease Porsha with reflex to WB Serum     Erythrocyte sedimentation rate auto     CRP inflammation     ORTHO  REFERRAL       See Patient Instructions. Please, call or return to clinic or go to the ER immediately if signs or symptoms worsen or fail to improve as anticipated.          Zayra Villanueva MD    [facility]    "

## 2018-10-27 NOTE — TELEPHONE ENCOUNTER
ACT Total Scores 5/8/2017 7/20/2017 3/22/2018   ACT TOTAL SCORE - - -   ASTHMA ER VISITS - - -   ASTHMA HOSPITALIZATIONS - - -   ACT TOTAL SCORE (Goal Greater than or Equal to 20) 20 18 20   In the past 12 months, how many times did you visit the emergency room for your asthma without being admitted to the hospital? 0 0 0   In the past 12 months, how many times were you hospitalized overnight because of your asthma? 0 0 0     
"Requested Prescriptions   Pending Prescriptions Disp Refills     montelukast (SINGULAIR) 10 MG tablet [Pharmacy Med Name: MONTELUKAST SOD TABS 10MG] 90 tablet 0        Last Written Prescription Date:  6.14.18  Last Fill Quantity: 90,  # refills: 0   Last Office Visit: 7/2/2018   Future Office Visit:      Sig: TAKE 1 TABLET DAILY    Leukotriene Inhibitors Protocol Passed    9/12/2018  8:51 AM       Passed - Patient is age 12 or older    If patient is under 16, ok to refill using age based dosing.          Passed - Asthma control assessment score within normal limits in last 6 months    Please review ACT score.          Passed - Recent (6 mo) or future (30 days) visit within the authorizing provider's specialty    Patient had office visit in the last 6 months or has a visit in the next 30 days with authorizing provider or within the authorizing provider's specialty.  See \"Patient Info\" tab in inbasket, or \"Choose Columns\" in Meds & Orders section of the refill encounter.              "
"Requested Prescriptions   Pending Prescriptions Disp Refills     montelukast (SINGULAIR) 10 MG tablet [Pharmacy Med Name: MONTELUKAST SOD TABS 10MG] 90 tablet 0     Sig: TAKE 1 TABLET DAILY    Leukotriene Inhibitors Protocol Passed    9/11/2018 11:55 PM       Passed - Patient is age 12 or older    If patient is under 16, ok to refill using age based dosing.          Passed - Asthma control assessment score within normal limits in last 6 months    Please review ACT score.          Passed - Recent (6 mo) or future (30 days) visit within the authorizing provider's specialty    Patient had office visit in the last 6 months or has a visit in the next 30 days with authorizing provider or within the authorizing provider's specialty.  See \"Patient Info\" tab in inbasket, or \"Choose Columns\" in Meds & Orders section of the refill encounter.              "
medical evaluation

## 2018-10-29 LAB — B BURGDOR IGG+IGM SER QL: 0.06 (ref 0–0.89)

## 2018-11-02 ENCOUNTER — OFFICE VISIT (OUTPATIENT)
Dept: ORTHOPEDICS | Facility: CLINIC | Age: 63
End: 2018-11-02
Payer: COMMERCIAL

## 2018-11-02 VITALS
DIASTOLIC BLOOD PRESSURE: 60 MMHG | WEIGHT: 198 LBS | HEIGHT: 66 IN | SYSTOLIC BLOOD PRESSURE: 121 MMHG | BODY MASS INDEX: 31.82 KG/M2

## 2018-11-02 DIAGNOSIS — M17.12 PRIMARY OSTEOARTHRITIS OF LEFT KNEE: Primary | ICD-10-CM

## 2018-11-02 PROCEDURE — 99244 OFF/OP CNSLTJ NEW/EST MOD 40: CPT | Performed by: FAMILY MEDICINE

## 2018-11-02 NOTE — LETTER
11/2/2018         RE: Tasneem Chan  [address]  [facility] [address]        Dear Colleague,    Thank you for referring your patient, Tasneem Chan, to the [facility] SPORTS MEDICINE. Please see a copy of my visit note below.    ASSESSMENT & PLAN  Patient Instructions     1. Primary osteoarthritis of left knee      -Patient has left knee pain due to also arthritis.  -Patient will start formal physical therapy and home exercise program  -Patient will start Voltaren gel as needed.  Patient may also continue Tylenol 2 extra strength tablets every 6-8 hours as needed.  -Patient will follow-up in 4-6 weeks if she continues to have pain.  -Call direct clinic number [[phone]] at any time with questions or concerns.    Albert Yeo MD [facility] Orthopedics and Sports Medicine  [facility]          -----    SUBJECTIVE  Tasneem Chan is a/an 63 year old female who is seen in consultation at the request of  Zayra Villanueva M.D. for evaluation of left knee pain. The patient is seen by themselves.    Onset: 1 week(s) ago. Reports insidious onset without acute precipitating event.  Location of Pain: left knee  Rating of Pain at worst: 10/10  Rating of Pain Currently: 2/10  Worsened by: bending, straightening  Better with: ice, tylenol  Treatments tried: rest/activity avoidance, ice, Tylenol and previous imaging (xray 10/26/18)  Associated symptoms: no distal numbness or tingling; denies swelling or warmth and feeling of instability  Orthopedic history: YES - left ankle arthritis caused by left ankle injury - patient wears walking boot when walking long distances Date: 2016  Relevant surgical history: NO  Social history: patient is retired    Past Medical History:   Diagnosis Date     Irritable bowel syndrome      Malignant neoplasm of breast (female), unspecified site 2004    radiation, tamoxifen, lumpectomy - rt breast     Migraine with aura, without mention  "of intractable migraine without mention of status migrainosus 2000     Mild persistent asthma 1997    mild - humidity and infection triggers     Personal history of DVT (deep vein thrombosis)     x2 - never had previous testing     Social History     Social History     Marital status:      Spouse name: eder     Number of children: 3     Years of education: 14     Occupational History      None      Social History Main Topics     Smoking status: Never Smoker     Smokeless tobacco: Never Used     Alcohol use No     Drug use: No     Sexual activity: Yes     Partners: Male      Comment: postmenopausal      Other Topics Concern     Caffeine Concern Yes     2 cups daily     Exercise Yes     walks     Seat Belt Yes     Parent/Sibling W/ Cabg, Mi Or Angioplasty Before 65f 55m? Yes     Mother and sister     Social History Narrative    From [place] - area close to [facility]. --Zayra Villanueva MD          Patient's past medical, surgical, social, and family histories were reviewed today and no changes are noted.    REVIEW OF SYSTEMS:  10 point ROS is negative other than symptoms noted above in HPI, Past Medical History or as stated below  Constitutional: NEGATIVE for fever, chills, change in weight  Skin: NEGATIVE for worrisome rashes, moles or lesions  GI/: NEGATIVE for bowel or bladder changes  Neuro: NEGATIVE for weakness, dizziness or paresthesias    OBJECTIVE:  /60  Ht 5' 6\" (1.676 m)  Wt 198 lb (89.8 kg)  BMI 31.96 kg/m2   General: healthy, alert and in no distress  HEENT: no scleral icterus or conjunctival erythema  Skin: no suspicious lesions or rash. No jaundice.  CV: no pedal edema  Resp: normal respiratory effort without conversational dyspnea   Psych: normal mood and affect  Gait: normal steady gait with appropriate coordination and balance  Neuro: Normal light sensory exam of lower extremity  MSK:  LEFT KNEE  Inspection:    normal alignment  Palpation:    Tender about the lateral patellar " facet, lateral joint line and medial joint line. Remainder of bony and ligamentous landmarks are nontender.    Trace effusion is present    Patellofemoral crepitus is Present  Range of Motion:     00 extension to 1200 flexion  Strength:    Quadriceps 4+/5 and hamstrings 5-/5    Extensor mechanism intact  Special Tests:    Positive: Patellar grind    Negative: patellar apprehension, MCL/valgus stress (0 & 30 deg), LCL/varus stress (0 & 30 deg), Lachman's, anterior drawer, posterior drawer, Kiersten's    Independent visualization of the below image:  No results found for this or any previous visit (from the past 24 hour(s)).          Albert Yeo MD [facility] Sports and Orthopedic Care      Again, thank you for allowing me to participate in the care of your patient.        Sincerely,        Albert Yeo, MD

## 2018-11-02 NOTE — PATIENT INSTRUCTIONS
1. Primary osteoarthritis of left knee      -Patient has left knee pain due to also arthritis.  -Patient will start formal physical therapy and home exercise program  -Patient will start Voltaren gel as needed.  Patient may also continue Tylenol 2 extra strength tablets every 6-8 hours as needed.  -Patient will follow-up in 4-6 weeks if she continues to have pain.  -Call direct clinic number [[phone]] at any time with questions or concerns.    Albert Yeo MD [facility]  [facility] [place]

## 2018-11-02 NOTE — MR AVS SNAPSHOT
After Visit Summary   11/2/2018    Tasneem Chan    MRN: [id number]           Patient Information     Date Of Birth          1955        Visit Information        Provider Department      11/2/2018 1:00 PM Yeo, Albert, MD [facility] SPORTS MEDICINE        Today's Diagnoses     Primary osteoarthritis of left knee    -  1      Care Instructions    1. Primary osteoarthritis of left knee      -Patient has left knee pain due to also arthritis.  -Patient will start formal physical therapy and home exercise program  -Patient will start Voltaren gel as needed.  Patient may also continue Tylenol 2 extra strength tablets every 6-8 hours as needed.  -Patient will follow-up in 4-6 weeks if she continues to have pain.  -Call direct clinic number [[phone]] at any time with questions or concerns.    Albert Yeo MD [facility] Orthopedics and Sports Medicine  [facility] [place]                Follow-ups after your visit        Additional Services     [facility] PT, HAND, AND CHIROPRACTIC REFERRAL       **This order will print in the [facility] Scheduling Office**    Physical Therapy, Hand Therapy and Chiropractic Care are available through:    *[facility]  *[facility]  *[facility]    Call one number to schedule at any of the above locations: [phone].    Your provider has referred you to: Physical Therapy at [facility] or [facility]    Indication/Reason for Referral: Knee Pain  Onset of Illness:   Therapy Orders: Evaluate and Treat  Special Programs: None  Special Request: Exercise: Active/Assistive ROM, Home Exercise Program, Passive ROM, Posture/Body Mechanics, Progressive Strengthening and Stretching/Flexibility  Manual Therapy: Joint Mobilization  Modalities: As Indicated:     Evelyn Rivas      Additional Comments for the Therapist or Chiropractor:     Please be aware that coverage of these services is subject to the terms and limitations of  "your health insurance plan.  Call member services at your health plan with any benefit or coverage questions.      Please bring the following to your appointment:    *Your personal calendar for scheduling future appointments  *Comfortable clothing                  Future tests that were ordered for you today     Open Future Orders        Priority Expected Expires Ordered    [facility] PT, HAND, AND CHIROPRACTIC REFERRAL Routine  2019            Who to contact     If you have questions or need follow up information about today's clinic visit or your schedule please contact [facility] SPORTS MEDICINE directly at [phone].  Normal or non-critical lab and imaging results will be communicated to you by [employer], letter or phone within 4 business days after the clinic has received the results. If you do not hear from us within 7 days, please contact the clinic through [employer] or phone. If you have a critical or abnormal lab result, we will notify you by phone as soon as possible.  Submit refill requests through "[employer] or call your pharmacy and they will forward the refill request to us. Please allow 3 business days for your refill to be completed.          Additional Information About Your Visit        "[employer] Information     "[employer] lets you send messages to your doctor, view your test results, renew your prescriptions, schedule appointments and more. To sign up, go to [email or web address]/"[email or web address] . Click on \"Log in\" on the left side of the screen, which will take you to the Welcome page. Then click on \"Sign up Now\" on the right side of the page.     You will be asked to enter the access code listed below, as well as some personal information. Please follow the directions to create your username and password.     Your access code is: [email or web address]  Expires: 2019 12:12 PM     Your access code will  in 90 days. If you need help or a new code, please call your [place] clinic or [phone].      " "  Care EveryWhere ID     This is your Care EveryWhere ID. This could be used by other organizations to access your [facility] medical records  [id number]        Your Vitals Were     Height BMI (Body Mass Index)                5' 6\" (1.676 m) 31.96 kg/m2           Blood Pressure from Last 3 Encounters:   11/02/18 121/60   10/26/18 102/72   07/02/18 118/70    Weight from Last 3 Encounters:   11/02/18 198 lb (89.8 kg)   10/26/18 198 lb (89.8 kg)   07/02/18 199 lb (90.3 kg)                 Today's Medication Changes          These changes are accurate as of 11/2/18  1:33 PM.  If you have any questions, ask your nurse or doctor.               Start taking these medicines.        Dose/Directions    diclofenac 1 % Gel topical gel   Commonly known as:  VOLTAREN   Used for:  Primary osteoarthritis of left knee   Started by:  Yeo, Albert, MD        Dose:  4 g   Place 4 g onto the skin 3 times daily as needed for moderate pain   Quantity:  1 Tube   Refills:  1            Where to get your medicines      These medications were sent to [facility] [place] [address]     Phone:  [phone]     diclofenac 1 % Gel topical gel                Primary Care Provider Office Phone # Fax #    Zayra Villanueva [phone]       [address] [place] [address]        Equal Access to Services     ALEX ROSAS AH: Gretta washingtono Sotru, waaxda luqadaha, qaybta kaalmada adeegyada, waxramin elizabeth rehman adesusan chau. So [facility] [phone].    ATENCIÓN: Si habla español, tiene a curtis disposición servicios gratuitos de asistencia lingüística. Llame al [phone].    We comply with applicable federal civil rights laws and [place] laws. We do not discriminate on the basis of race, color, national origin, age, disability, sex, sexual orientation, or gender identity.            Thank you!     Thank you for " choosing [facility]  for your care. Our goal is always to provide you with excellent care. Hearing back from our patients is one way we can continue to improve our services. Please take a few minutes to complete the written survey that you may receive in the mail after your visit with us. Thank you!             Your Updated Medication List - Protect others around you: Learn how to safely use, store and throw away your medicines at www.disposemymeds.org.          This list is accurate as of 11/2/18  1:33 PM.  Always use your most recent med list.                   Brand Name Dispense Instructions for use Diagnosis    ACETAMINOPHEN PO      Take by mouth as needed for pain        * albuterol (2.5 MG/3ML) 0.083% neb solution     25 vial    Take 1 vial (2.5 mg) by nebulization every 6 hours as needed for shortness of breath / dyspnea or wheezing    Mild intermittent asthma with acute exacerbation       * albuterol 108 (90 Base) MCG/ACT inhaler    PROAIR HFA    25.5 g    USE 1 TO 2 INHALATIONS EVERY 4 TO 6 HOURS AS NEEDED    Mild intermittent asthma with acute exacerbation       cetirizine 10 MG tablet    ZYRTEC    30 tablet    Take 1 tablet by mouth daily.    Rash       cyclobenzaprine 10 MG tablet    FLEXERIL    20 tablet    1/2 tab during the day and 1 tab at bedtime as needed for muscle spasms    Acute midline thoracic back pain       diclofenac 1 % Gel topical gel    VOLTAREN    1 Tube    Place 4 g onto the skin 3 times daily as needed for moderate pain    Primary osteoarthritis of left knee       fluticasone 50 MCG/ACT spray    FLONASE    16 g    USE 1 TO 2 SPRAYS IN EACH NOSTRIL DAILY    Allergic rhinitis, unspecified chronicity, unspecified seasonality, unspecified trigger       fluticasone-salmeterol 250-50 MCG/DOSE diskus inhaler    ADVAIR DISKUS    1 Inhaler    Inhale 1 puff into the lungs every 12 hours During asthma exacerbations    Mild intermittent asthma with acute exacerbation        gabapentin 300 MG capsule    NEURONTIN    180 capsule    Take 1 capsule (300 mg) by mouth 2 times daily    Left ankle injury, initial encounter, Chronic pain of left knee       montelukast 10 MG tablet    SINGULAIR    90 tablet    TAKE 1 TABLET DAILY    Mild persistent asthma without complication, Intermittent asthma, with acute exacerbation       SUMAtriptan 100 MG tablet    IMITREX    27 tablet    TAKE AS INSTRUCTED BY YOUR PRESCRIBER    Migraine with aura       verapamil 240 MG CR tablet    CALAN-SR    90 tablet    TAKE 1 TABLET AT BEDTIME    Migraine with aura       * Notice:  This list has 2 medication(s) that are the same as other medications prescribed for you. Read the directions carefully, and ask your doctor or other care provider to review them with you.

## 2018-11-02 NOTE — PROGRESS NOTES
ASSESSMENT & PLAN  Patient Instructions     1. Primary osteoarthritis of left knee      -Patient has left knee pain due to also arthritis.  -Patient will start formal physical therapy and home exercise program  -Patient will start Voltaren gel as needed.  Patient may also continue Tylenol 2 extra strength tablets every 6-8 hours as needed.  -Patient will follow-up in 4-6 weeks if she continues to have pain.  -Call direct clinic number [[phone]] at any time with questions or concerns.    Albert Yeo MD [facility] Orthopedics and Sports Medicine  [facility]          -----    SUBJECTIVE  Tasneem Chan is a/an 63 year old female who is seen in consultation at the request of  Zayra Villanueva M.D. for evaluation of left knee pain. The patient is seen by themselves.    Onset: 1 week(s) ago. Reports insidious onset without acute precipitating event.  Location of Pain: left knee  Rating of Pain at worst: 10/10  Rating of Pain Currently: 2/10  Worsened by: bending, straightening  Better with: ice, tylenol  Treatments tried: rest/activity avoidance, ice, Tylenol and previous imaging (xray 10/26/18)  Associated symptoms: no distal numbness or tingling; denies swelling or warmth and feeling of instability  Orthopedic history: YES - left ankle arthritis caused by left ankle injury - patient wears walking boot when walking long distances Date: 2016  Relevant surgical history: NO  Social history: patient is retired    Past Medical History:   Diagnosis Date     Irritable bowel syndrome      Malignant neoplasm of breast (female), unspecified site 2004    radiation, tamoxifen, lumpectomy - rt breast     Migraine with aura, without mention of intractable migraine without mention of status migrainosus 2000     Mild persistent asthma 1997    mild - humidity and infection triggers     Personal history of DVT (deep vein thrombosis)     x2 - never had previous testing     Social History     Social  "History     Marital status:      Spouse name: eder     Number of children: 3     Years of education: 14     Occupational History      None      Social History Main Topics     Smoking status: Never Smoker     Smokeless tobacco: Never Used     Alcohol use No     Drug use: No     Sexual activity: Yes     Partners: Male      Comment: postmenopausal      Other Topics Concern     Caffeine Concern Yes     2 cups daily     Exercise Yes     walks     Seat Belt Yes     Parent/Sibling W/ Cabg, Mi Or Angioplasty Before 65f 55m? Yes     Mother and sister     Social History Narrative    From [place] - area close to [facility]. --Zayra Villanueva MD          Patient's past medical, surgical, social, and family histories were reviewed today and no changes are noted.    REVIEW OF SYSTEMS:  10 point ROS is negative other than symptoms noted above in HPI, Past Medical History or as stated below  Constitutional: NEGATIVE for fever, chills, change in weight  Skin: NEGATIVE for worrisome rashes, moles or lesions  GI/: NEGATIVE for bowel or bladder changes  Neuro: NEGATIVE for weakness, dizziness or paresthesias    OBJECTIVE:  /60  Ht 5' 6\" (1.676 m)  Wt 198 lb (89.8 kg)  BMI 31.96 kg/m2   General: healthy, alert and in no distress  HEENT: no scleral icterus or conjunctival erythema  Skin: no suspicious lesions or rash. No jaundice.  CV: no pedal edema  Resp: normal respiratory effort without conversational dyspnea   Psych: normal mood and affect  Gait: normal steady gait with appropriate coordination and balance  Neuro: Normal light sensory exam of lower extremity  MSK:  LEFT KNEE  Inspection:    normal alignment  Palpation:    Tender about the lateral patellar facet, lateral joint line and medial joint line. Remainder of bony and ligamentous landmarks are nontender.    Trace effusion is present    Patellofemoral crepitus is Present  Range of Motion:     00 extension to 1200 flexion  Strength:    Quadriceps 4+/5 and " hamstrings 5-/5    Extensor mechanism intact  Special Tests:    Positive: Patellar grind    Negative: patellar apprehension, MCL/valgus stress (0 & 30 deg), LCL/varus stress (0 & 30 deg), Lachman's, anterior drawer, posterior drawer, Kiersten's    Independent visualization of the below image:  No results found for this or any previous visit (from the past 24 hour(s)).          Albert Yeo MD [facility]

## 2018-11-08 ENCOUNTER — TELEPHONE (OUTPATIENT)
Dept: FAMILY MEDICINE | Facility: CLINIC | Age: 63
End: 2018-11-08

## 2018-11-08 NOTE — TELEPHONE ENCOUNTER
Reason for Call:  Medication or medication refill:Medication Change    Do you use a [facility]?  Name of the pharmacy and phone number for the current request:  [facility] - [phone]    Name of the medication requested: fluticasone-salmeterol (ADVAIR DISKUS) 250-50 MCG/DOSE diskus inhaler    Other request: Patient wants to switch to a cheaper inhaler or discuss her options     Can we leave a detailed message on this number? YES    Phone number patient can be reached at: Home number on file [phone] (home)    Best Time: Anytime     Call taken on 11/8/2018 at 10:10 AM by Carmen Nick

## 2018-11-08 NOTE — TELEPHONE ENCOUNTER
Called patient @ # below - Left a detailed VM advising patient to contact their insurance company and/or pharmacy to see which inhaler would be the cheapest       Pamela Martin RN  [place] [facility]

## 2018-11-27 DIAGNOSIS — M25.562 CHRONIC PAIN OF LEFT KNEE: ICD-10-CM

## 2018-11-27 DIAGNOSIS — S99.912A LEFT ANKLE INJURY, INITIAL ENCOUNTER: ICD-10-CM

## 2018-11-27 DIAGNOSIS — G89.29 CHRONIC PAIN OF LEFT KNEE: ICD-10-CM

## 2018-11-27 NOTE — TELEPHONE ENCOUNTER
Reason for Call:  Medication or medication refill:    Do you use a [facility]?  Name of the pharmacy and phone number for the current request:   [facility] [place] [address] [place] [address]     Name of the medication requested: gabapentin (NEURONTIN) 300 MG capsule    Can we leave a detailed message on this number? YES    Phone number patient can be reached at: Home number on file [phone] (home)    Best Time: Anytime    Call taken on 11/27/2018 at 12:29 PM by Neha Poe

## 2018-11-28 RX ORDER — GABAPENTIN 300 MG/1
300 CAPSULE ORAL 2 TIMES DAILY
Qty: 180 CAPSULE | Refills: 1 | Status: SHIPPED | OUTPATIENT
Start: 2018-11-28 | End: 2020-02-18

## 2018-12-10 DIAGNOSIS — J45.21 INTERMITTENT ASTHMA, WITH ACUTE EXACERBATION: ICD-10-CM

## 2018-12-10 DIAGNOSIS — G43.109 MIGRAINE WITH AURA: ICD-10-CM

## 2018-12-10 DIAGNOSIS — J45.30 MILD PERSISTENT ASTHMA WITHOUT COMPLICATION: ICD-10-CM

## 2018-12-10 RX ORDER — VERAPAMIL HYDROCHLORIDE 240 MG/1
TABLET, FILM COATED, EXTENDED RELEASE ORAL
Qty: 90 TABLET | Refills: 0 | Status: SHIPPED | OUTPATIENT
Start: 2018-12-10 | End: 2019-03-10

## 2018-12-10 NOTE — TELEPHONE ENCOUNTER
"Requested Prescriptions   Pending Prescriptions Disp Refills     verapamil ER (CALAN-SR) 240 MG CR tablet [Pharmacy Med Name: VERAPAMIL HCL ER TABS 240MG] 90 tablet 0        Last Written Prescription Date:  9.11.18  Last Fill Quantity: 90,  # refills: 0   Last Office Visit: 10/26/2018   Future Office Visit:      Sig: TAKE 1 TABLET AT BEDTIME    Calcium Channel Blockers Protocol  Passed - 12/10/2018 12:19 AM       Passed - Blood pressure under 140/90 in past 12 months    BP Readings from Last 3 Encounters:   11/02/18 121/60   10/26/18 102/72   07/02/18 118/70                Passed - Normal ALT in past 12 months    Recent Labs   Lab Test 03/22/18  1307   ALT 21            Passed - Recent (12 mo) or future (30 days) visit within the authorizing provider's specialty    Patient had office visit in the last 12 months or has a visit in the next 30 days with authorizing provider or within the authorizing provider's specialty.  See \"Patient Info\" tab in inbasket, or \"Choose Columns\" in Meds & Orders section of the refill encounter.             Passed - Patient is age 18 or older       Passed - No active pregnancy on record       Passed - Normal serum creatinine on file in past 12 months    Recent Labs   Lab Test 03/22/18  1307   CR 0.57            Passed - No positive pregnancy test in past 12 months          "

## 2018-12-10 NOTE — TELEPHONE ENCOUNTER
Prescription approved per [facility] Refill Protocol.  Cherelle Boland RN  [facility]

## 2018-12-11 NOTE — TELEPHONE ENCOUNTER
"Requested Prescriptions   Pending Prescriptions Disp Refills     montelukast (SINGULAIR) 10 MG tablet [Pharmacy Med Name: MONTELUKAST SOD TABS 10MG] 90 tablet 0      Last Written Prescription Date:  9.12.18  Last Fill Quantity: 90,  # refills: 0   Last Office Visit: 10/26/2018   Future Office Visit:      Sig: TAKE 1 TABLET DAILY    Leukotriene Inhibitors Protocol Failed - 12/10/2018 11:48 PM       Failed - Asthma control assessment score within normal limits in last 6 months    Please review ACT score.          Passed - Patient is age 12 or older    If patient is under 16, ok to refill using age based dosing.          Passed - Recent (6 mo) or future (30 days) visit within the authorizing provider's specialty    Patient had office visit in the last 6 months or has a visit in the next 30 days with authorizing provider or within the authorizing provider's specialty.  See \"Patient Info\" tab in inbasket, or \"Choose Columns\" in Meds & Orders section of the refill encounter.              "

## 2018-12-12 RX ORDER — MONTELUKAST SODIUM 10 MG/1
TABLET ORAL
Qty: 90 TABLET | Refills: 0 | Status: SHIPPED | OUTPATIENT
Start: 2018-12-12 | End: 2019-03-11

## 2018-12-12 NOTE — TELEPHONE ENCOUNTER
Medication is being filled for 1 time refill only due to:  Patient needs to be seen because due for ACT testing.     Cherelle Boland RN  [facility]

## 2018-12-28 DIAGNOSIS — J45.21 MILD INTERMITTENT ASTHMA WITH ACUTE EXACERBATION: ICD-10-CM

## 2018-12-28 DIAGNOSIS — G43.109 MIGRAINE WITH AURA: ICD-10-CM

## 2018-12-28 DIAGNOSIS — S99.912A LEFT ANKLE INJURY, INITIAL ENCOUNTER: ICD-10-CM

## 2018-12-28 DIAGNOSIS — G89.29 CHRONIC PAIN OF LEFT KNEE: ICD-10-CM

## 2018-12-28 DIAGNOSIS — M25.562 CHRONIC PAIN OF LEFT KNEE: ICD-10-CM

## 2018-12-28 RX ORDER — ALBUTEROL SULFATE 90 UG/1
AEROSOL, METERED RESPIRATORY (INHALATION)
Qty: 25.5 G | Refills: 1 | Status: SHIPPED | OUTPATIENT
Start: 2018-12-28 | End: 2019-01-17

## 2018-12-28 RX ORDER — SUMATRIPTAN 100 MG/1
TABLET, FILM COATED ORAL
Qty: 27 TABLET | Refills: 1 | Status: SHIPPED | OUTPATIENT
Start: 2018-12-28

## 2018-12-28 RX ORDER — GABAPENTIN 300 MG/1
CAPSULE ORAL
Qty: 180 CAPSULE | Refills: 1 | Status: SHIPPED | OUTPATIENT
Start: 2018-12-28 | End: 2020-02-13

## 2018-12-28 NOTE — TELEPHONE ENCOUNTER
ACT Total Scores 5/8/2017 7/20/2017 3/22/2018   ACT TOTAL SCORE - - -   ASTHMA ER VISITS - - -   ASTHMA HOSPITALIZATIONS - - -   ACT TOTAL SCORE (Goal Greater than or Equal to 20) 20 18 20   In the past 12 months, how many times did you visit the emergency room for your asthma without being admitted to the hospital? 0 0 0   In the past 12 months, how many times were you hospitalized overnight because of your asthma? 0 0 0     Albuterol, Sumatriptan:  Refilled per RN Protocol.     Gabapentin:   Routing refill request to provider for review/approval because:  Drug not on the [facility] refill protocol         Pamela Martin RN  [facility]

## 2019-01-17 ENCOUNTER — OFFICE VISIT (OUTPATIENT)
Dept: FAMILY MEDICINE | Facility: CLINIC | Age: 64
End: 2019-01-17
Payer: COMMERCIAL

## 2019-01-17 VITALS
DIASTOLIC BLOOD PRESSURE: 72 MMHG | OXYGEN SATURATION: 97 % | SYSTOLIC BLOOD PRESSURE: 126 MMHG | BODY MASS INDEX: 31.23 KG/M2 | HEART RATE: 88 BPM | TEMPERATURE: 97.5 F | WEIGHT: 199 LBS | HEIGHT: 67 IN

## 2019-01-17 DIAGNOSIS — J01.01 ACUTE RECURRENT MAXILLARY SINUSITIS: ICD-10-CM

## 2019-01-17 DIAGNOSIS — J20.9 ACUTE BRONCHITIS, UNSPECIFIED ORGANISM: Primary | ICD-10-CM

## 2019-01-17 DIAGNOSIS — J45.30 MILD PERSISTENT ASTHMA WITHOUT COMPLICATION: ICD-10-CM

## 2019-01-17 DIAGNOSIS — J45.21 MILD INTERMITTENT ASTHMA WITH ACUTE EXACERBATION: ICD-10-CM

## 2019-01-17 DIAGNOSIS — Z23 NEED FOR PROPHYLACTIC VACCINATION AND INOCULATION AGAINST INFLUENZA: ICD-10-CM

## 2019-01-17 DIAGNOSIS — Z11.4 SCREENING FOR HIV (HUMAN IMMUNODEFICIENCY VIRUS): ICD-10-CM

## 2019-01-17 DIAGNOSIS — Z11.59 NEED FOR HEPATITIS C SCREENING TEST: ICD-10-CM

## 2019-01-17 PROCEDURE — 99214 OFFICE O/P EST MOD 30 MIN: CPT | Performed by: FAMILY MEDICINE

## 2019-01-17 RX ORDER — ALBUTEROL SULFATE 0.83 MG/ML
2.5 SOLUTION RESPIRATORY (INHALATION) EVERY 6 HOURS PRN
Qty: 25 VIAL | Refills: 11 | Status: SHIPPED | OUTPATIENT
Start: 2019-01-17 | End: 2020-02-13

## 2019-01-17 RX ORDER — ALBUTEROL SULFATE 90 UG/1
AEROSOL, METERED RESPIRATORY (INHALATION)
Qty: 25.5 G | Refills: 1 | Status: SHIPPED | OUTPATIENT
Start: 2019-01-17

## 2019-01-17 RX ORDER — AZITHROMYCIN 250 MG/1
TABLET, FILM COATED ORAL
Qty: 12 TABLET | Refills: 1 | Status: SHIPPED | OUTPATIENT
Start: 2019-01-17 | End: 2019-08-03

## 2019-01-17 ASSESSMENT — ANXIETY QUESTIONNAIRES
6. BECOMING EASILY ANNOYED OR IRRITABLE: MORE THAN HALF THE DAYS
2. NOT BEING ABLE TO STOP OR CONTROL WORRYING: NEARLY EVERY DAY
GAD7 TOTAL SCORE: 10
5. BEING SO RESTLESS THAT IT IS HARD TO SIT STILL: NOT AT ALL
7. FEELING AFRAID AS IF SOMETHING AWFUL MIGHT HAPPEN: MORE THAN HALF THE DAYS
1. FEELING NERVOUS, ANXIOUS, OR ON EDGE: NEARLY EVERY DAY
3. WORRYING TOO MUCH ABOUT DIFFERENT THINGS: NOT AT ALL

## 2019-01-17 ASSESSMENT — PATIENT HEALTH QUESTIONNAIRE - PHQ9
SUM OF ALL RESPONSES TO PHQ QUESTIONS 1-9: 5
5. POOR APPETITE OR OVEREATING: NOT AT ALL

## 2019-01-17 ASSESSMENT — MIFFLIN-ST. JEOR: SCORE: 1492.35

## 2019-01-17 NOTE — PROGRESS NOTES
SUBJECTIVE:                                                    Tasneem Chan is a 63 year old female who presents to clinic today for the following health issues:    Acute Illness   Acute illness concerns: sinus  Onset: x 3 weeks    Fever: no    Chills/Sweats: no    Headache (location?): YES    Sinus Pressure:YES- bilateral maxillary the last 2-3 days.     Conjunctivitis:  no    Ear Pain: YES: right    Rhinorrhea: YES    Congestion: YES    Sore Throat: no     Cough: YES-productive of clear sputum    Some chest discomfort with lying down in the middle of her lower chest.     Wheeze: no    Decreased Appetite: no    Nausea: no    Vomiting: no    Diarrhea:  no    Dysuria/Freq.: no    Fatigue/Achiness: No    Sick/Strep Exposure: no    Daughter, Kathy ,  was diagnosed with bronchitis just before Christmas 2018.     Has been using her advair 250/50 1 inhalation twice daily.  Using her albuterol MDI 2 puffs every night before bed.      Therapies Tried and outcome: Advil,Tylenol    Flying to [facility] in 3 days for vacation for 14 days.     Patient Active Problem List   Diagnosis     Malignant neoplasm of female breast (H)- in 2003 - right breast -s/p lumpectomy with radiation, tamoxifen     Intractable chronic migraine without aura and without status migrainosus     Irritable bowel syndrome     GERD (GASTROESOPHAGEAL REFLUX DISEASE)- much improved with diet     Intermittent asthma     Mild persistent asthma     Hearing loss, sensorineural, high frequency, right- from right 8th cranial nerve tumor (meningioma)     Left ankle injury, initial encounter     Left knee pain     Advanced directives, counseling/discussion     Benign neoplasm of cranial nerve (H)     Blood pressure elevated without history of HTN     New daily persistent headache     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 distance      Meningioma  (H)- s/p removal /craniotomy at [facility] in 2014        Current Outpatient Medications   Medication Sig Dispense Refill     albuterol (PROAIR HFA) 108 (90 Base) MCG/ACT inhaler USE 1 TO 2 INHALATIONS EVERY 4 TO 6 HOURS AS NEEDED 25.5 g 1     albuterol (PROVENTIL) (2.5 MG/3ML) 0.083% neb solution Take 1 vial (2.5 mg) by nebulization every 6 hours as needed for shortness of breath / dyspnea or wheezing 25 vial 11     azithromycin (ZITHROMAX) 250 MG tablet 2 tabs first day, then 1 tab by mouth daily for 4 additional days, then repeat if needed 12 tablet 1     fluticasone (FLONASE) 50 MCG/ACT spray USE 1 TO 2 SPRAYS IN EACH NOSTRIL DAILY 16 g 1     fluticasone-salmeterol (ADVAIR DISKUS) 250-50 MCG/DOSE inhaler Inhale 1 puff into the lungs every 12 hours During asthma exacerbations 3 Inhaler 4     gabapentin (NEURONTIN) 300 MG capsule TAKE 1 CAPSULE TWICE A  capsule 1     gabapentin (NEURONTIN) 300 MG capsule Take 1 capsule (300 mg) by mouth 2 times daily 180 capsule 1     montelukast (SINGULAIR) 10 MG tablet TAKE 1 TABLET DAILY 90 tablet 0     verapamil ER (CALAN-SR) 240 MG CR tablet TAKE 1 TABLET AT BEDTIME 90 tablet 0     ACETAMINOPHEN PO Take by mouth as needed for pain       cetirizine (ZYRTEC) 10 MG tablet Take 1 tablet by mouth daily. 30 tablet 11     cyclobenzaprine (FLEXERIL) 10 MG tablet 1/2 tab during the day and 1 tab at bedtime as needed for muscle spasms 20 tablet 0     diclofenac (VOLTAREN) 1 % GEL topical gel Place 4 g onto the skin 3 times daily as needed for moderate pain 1 Tube 1     SUMAtriptan (IMITREX) 100 MG tablet TAKE AS INSTRUCTED BY YOUR PRESCRIBER 27 tablet 1          Allergies   Allergen Reactions     Levaquin [Levofloxacin Hemihydrate]      diarrhea         Problem list and histories reviewed & adjusted, as indicated.  Additional history: as documented    Reviewed and updated as needed this visit by clinical staff  Tobacco  Meds       Reviewed and updated as needed this visit by  "Provider       ROS:   ROS: 12 point ROS neg other than the symptoms noted above.     OBJECTIVE:                                                    /72   Pulse 88   Temp 97.5  F (36.4  C) (Tympanic)   Ht 1.689 m (5' 6.5\")   Wt 90.3 kg (199 lb)   SpO2 97%   Breastfeeding? No   BMI 31.64 kg/m    Body mass index is 31.64 kg/m .   GENERAL: healthy, alert, well nourished, well hydrated, no distress  HENT: ear canals- normal; TMs- normal; Nose- congested; with bilateral maxillary sinus tenderness to palpation; Mouth- no ulcers, no lesions  NECK: no tenderness, no adenopathy, no asymmetry, no masses, no stiffness; thyroid- normal to palpation  RESP: lungs clear to auscultation - no rales, no rhonchi, no wheezes,  with deep breathing, but with cough has coarse moist rhonchi and wheezes  at the mid posterior fields that radiate throughout the lungs and don't clear with continued coughing.     CV: regular rates and rhythm, normal S1 S2, no S3 or S4 and no murmur, no click or rub -  ABDOMEN: soft, no tenderness, no  hepatosplenomegaly, no masses, normal bowel sounds  MS: extremities- no gross deformities noted, no edema    Diagnostic test results:  .See [facility] orders.      ASSESSMENT/PLAN:                                                        ICD-10-CM    1. Acute bronchitis, unspecified organism J20.9 azithromycin (ZITHROMAX) 250 MG tablet   2. Mild intermittent asthma with acute exacerbation J45.21 azithromycin (ZITHROMAX) 250 MG tablet     albuterol (PROAIR HFA) 108 (90 Base) MCG/ACT inhaler     albuterol (PROVENTIL) (2.5 MG/3ML) 0.083% neb solution     fluticasone-salmeterol (ADVAIR DISKUS) 250-50 MCG/DOSE inhaler     DISCONTINUED: fluticasone-salmeterol (ADVAIR DISKUS) 250-50 MCG/DOSE inhaler   3. Acute recurrent maxillary sinusitis J01.01    4. Need for hepatitis C screening test Z11.59    5. Screening for HIV (human immunodeficiency virus) Z11.4    6. Need for prophylactic vaccination and inoculation " against influenza Z23    7. Mild persistent asthma without complication J45.30          Pt declines all immunizations today. And testing for HIV and hep C today.   Pt will make a nurse only visit for her immunizations upon return from vacation.   See Patient Instructions.           Zayra Villanueva MD    [facility]

## 2019-01-17 NOTE — PATIENT INSTRUCTIONS
Sinusitis           What is sinusitis?   Sinusitis is swollen, infected linings of the sinuses. The sinuses are hollow spaces in the bones of your face and skull. They connect with the nose through small openings. Like the nose, their linings make mucus.   How does it occur?   Sinusitis occurs when the sinus linings become infected. The passageways from the sinuses to the nose are very narrow. Swelling and mucus may block the passageways. This leads to pressure changes in the sinuses that can be painful.   A number of things can cause swelling and sinusitis. Most often it's allergens (things that cause allergies, like pollen and mold) and viruses, such as viruses that cause the common cold. Whether the cause is allergies or a virus, the sinus linings can swell. When swelling causes the sinus passageway to swell shut, bacteria, viruses, and even fungus can be trapped in the sinuses and cause a sinus infection.   If your nasal bones have been injured or are deformed, causing partial blockage of the sinus openings, you are more likely to get sinusitis.   What are the symptoms?   Symptoms include:   feeling of fullness or pressure in your head   a headache that is most painful when you first wake up in the morning or when you bend your head down or forward   pain above or below your eyes   aching in the upper jaw and teeth   runny or stuffy nose   cough, especially at night   fluid draining down the back of your throat (postnasal drainage)   sore throat in the morning or evening.   How is it diagnosed?   Your healthcare provider will ask about your symptoms and will examine you. You may have an X-ray to look for swelling, fluid, or small benign growths (polyps) in the sinuses.   How is it treated?   Decongestants may help. They may be nonprescription or prescription. They are available as liquids, pills, and nose sprays.   Your healthcare provider may prescribe an antibiotic. In some cases you may need to  take decongestants and antibiotics for several weeks.   You may need nonprescription medicine for pain, such as acetaminophen or ibuprofen. Check with your healthcare provider before you give any medicine that contains aspirin or salicylates to a child or teen. This includes medicines like baby aspirin, some cold medicines, and Pepto Bismol. Children and teens who take aspirin are at risk for a serious illness called Reye's syndrome. Ibuprofen is an NSAID. Nonsteroidal anti-inflammatory medicines (NSAIDs) may cause stomach bleeding and other problems. These risks increase with age. Read the label and take as directed. Unless recommended by your healthcare provider, do not take NSAIDs for more than 10 days for any reason.   If you have chronic or repeated sinus infections, allergies may be the cause. Your healthcare provider may prescribe antihistamine tablets or prescription nasal sprays (steroids or cromolyn) to treat the allergies.   If you have chronic, severe sinusitis that does not respond to treatment with medicines, surgery may be done. The surgeon can create an extra or enlarged passageway in the wall of the sinus cavity. This allows the sinuses to drain more easily through the nasal passages. This should help them stay free of infection.   How long will the effects last?   Symptoms may get better gradually over 3 to 10 days. Depending on what caused the sinusitis and how severe it is, it may last for days or weeks. The symptoms may come back if you do not finish all of your antibiotic.   How can I take care of myself?   Follow your healthcare provider's instructions.   If you are taking an antibiotic, take all of it as directed by your provider. If you stop taking the medicine when your symptoms are gone but before you have taken all of the medicine, symptoms may come back.   Avoid tobacco smoke.   If you have allergies, take care to avoid the things you are allergic to, such as animal dander.   Add  moisture to the air with a humidifier or a vaporizer, unless you have mold allergy (mold may grow in your vaporizer).   Inhale steam from a basin of hot water or shower to help open your sinuses and relieve pain.   Use saline nasal sprays to help wash out nasal passages and clear some mucus from the airways.   Use decongestants as directed on the label or by your provider.   If you are using a nonprescription nasal-spray decongestant, generally you should not use it for more than 3 days. After 3 days it may cause your symptoms to get worse. Ask your healthcare provider if it is OK for you to use a nasal spray decongestant longer than this.   Get plenty of rest.   Drink more fluids to keep the mucus as thin as possible so your sinuses can drain more easily.   Put warm compresses on painful areas.   Take antibiotics as prescribed. Use all of the medicine, even after you feel better. Some sinus infections require 2 to 4 weeks of antibiotic treatment.   See your healthcare provider if the pain lasts for several days or gets worse.   If the sinus areas above or below your eyes are swollen or bulging, see your healthcare provider right away. This symptom may mean that the infection is spreading. A spreading infection can affect other parts of your body--even the brain--and needs to be treated promptly.   How can I help prevent sinusitis?   Treat your colds and allergies promptly. Use decongestants as soon as you start having symptoms.   Do not smoke and stay away from secondhand smoke.   Drink lots of fluids to keep the mucus thin.   Humidify your home if the air is particularly dry.   If you have sinus infections often, consider having allergy tests.   If sinusitis continues to be a problem despite treatment, you might need an exam by an ear, nose, and throat doctor (called an ENT or otolaryngologist). The specialist will check for polyps or a deformed bone that may be blocking your sinuses.     Published by [employer].   This content is reviewed periodically and is subject to change as new health information becomes available. The information is intended to inform and educate and is not a replacement for medical evaluation, advice, diagnosis or treatment by a healthcare professional.   Developed by [employer].   ? 2010 [employer] and/or its affiliates. All Rights Reserved.   Copyright   Clinical Reference Systems 2011  Adult Health Advisor                           Acute Bronchitis                  What is acute bronchitis?   Bronchitis is an infection of the air passages--that is, the tubes that connect the windpipe to the lungs. It causes swelling and irritation of the airways. With acute bronchitis you usually have a cough that produces phlegm and pain behind the breastbone when you breathe deeply or cough.   How does it occur?   Bronchitis often occurs with viral infections of the respiratory tract, such as colds and flu. Bronchitis may also be caused by bacterial infections. It may occur with childhood illnesses such as measles and whooping cough.   Attacks are most frequent during the winter or when the level of air pollution is high.   Infants, young children, older adults, smokers, and people with heart disease or lung disease (including asthma and allergies) are most likely to get acute bronchitis.   What are the symptoms?   Symptoms may include:   a deep cough that produces yellowish or greenish phlegm   pain behind the breastbone when you breathe deeply or cough   wheezing   feeling short of breath   fever   chills   headache   sore muscles.   How is it diagnosed?   Your healthcare provider will ask about your symptoms and examine you. You may have tests, such as:   a test of phlegm to look for bacteria   chest X-ray   blood tests.   How is it treated?   Acute bronchitis often does not require medical treatment. Resting at home and drinking plenty of fluids to keep the mucus loose may be all you need to do to get  better in a few days. If your symptoms are severe or you have other health problems (such as heart or lung disease or diabetes), you may need to take antibiotics.   How long will the effects last?   Most of the time acute bronchitis clears up in a few days. Your cough may slowly get better in 1 to 2 weeks.   It may take you longer to recover if:   You are a smoker.   You live in an area where air pollution is a problem.   You have a heart or lung disease.   You have any other continuing health problems.   How can I take care of myself?   You can help yourself by:   following the full treatment your healthcare provider recommends   using a vaporizer, humidifier, or steam from hot water to add moisture to the air   drinking plenty of liquids   taking cough medicine if recommended by your healthcare provider   resting in bed   taking aspirin or acetaminophen to reduce fever and relieve headache and muscle pain (Check with your healthcare provider before you give any medicine that contains aspirin or salicylates to a child or teen. This includes medicines like baby aspirin, some cold medicines, and Pepto Bismol. Children and teens who take aspirin are at risk for a serious illness called Reye's syndrome.)   eating healthy meals.   Call your healthcare provider if:   You have trouble breathing.   You have a fever of 101.5?F (38.6?C) or higher.   You cough up blood.   Your symptoms are getting worse instead of better.   You don't start to feel better after 3 days of treatment.   You have any symptoms that concern you.   How can I help prevent acute bronchitis?   To reduce your risk of getting a respiratory infection:   Do not smoke.   Wash your hands often, especially when you are around people with colds (upper respiratory infections).   If you have asthma or allergies, keep your symptoms under good control.   Get regular exercise.   Eat healthy foods.       Published by [employer].  This content is reviewed  periodically and is subject to change as new health information becomes available. The information is intended to inform and educate and is not a replacement for medical evaluation, advice, diagnosis or treatment by a healthcare professional.   Developed by [employer].   ? 2010 [employer] [facility] and/or its affiliates. All Rights Reserved.   Copyright   Clinical Reference Systems 2011                   Thank you for choosing [facility]  for your Health Care. It was a pleasure seeing you at your visit today. Please contact us with any questions or concerns you may have.                   Zayra Villanueva MD                                  To reach your [facility] care team after hours call:   [phone]    Our clinic hours are:     Monday- 7:30 am - 7:00 pm                             Tuesday through Friday- 7:30 am - 5:00 pm                                        Saturday- 8:00 am - 12:00 pm                  Phone:  [phone]    Our pharmacy hours are:     Monday  8:00 am to 7:00 pm      Tuesday through Friday 8:00am to 6:00pm                        Saturday - 9:00 am to 1:00 pm      Sunday : Closed.              Phone:  [phone]      There is also information available at our web site:  [email or web address]    If your provider ordered any lab tests and you do not receive the results within 10 business days, please call the clinic.    If you need a medication refill please contact your pharmacy.  Please allow 2 business days for your refill to be completed.    Our clinic offers telephone visits and e visits.  Please ask one of your team members to explain more.      Use [employer] (secure email communication and access to your chart) to send your primary care provider a message or make an appointment. Ask someone on your Team how to sign up for "[employer],"t.

## 2019-01-18 ASSESSMENT — ANXIETY QUESTIONNAIRES: GAD7 TOTAL SCORE: 10

## 2019-01-18 ASSESSMENT — ASTHMA QUESTIONNAIRES: ACT_TOTALSCORE: 14

## 2019-01-24 ENCOUNTER — TELEPHONE (OUTPATIENT)
Dept: FAMILY MEDICINE | Facility: CLINIC | Age: 64
End: 2019-01-24

## 2019-01-24 DIAGNOSIS — B37.31 YEAST INFECTION OF THE VAGINA: Primary | ICD-10-CM

## 2019-01-24 RX ORDER — FLUCONAZOLE 150 MG/1
150 TABLET ORAL ONCE
Qty: 2 TABLET | Refills: 0 | Status: SHIPPED | OUTPATIENT
Start: 2019-01-24 | End: 2019-01-24

## 2019-01-24 NOTE — TELEPHONE ENCOUNTER
Reason for call:  Patient reporting a symptom    Symptom or request: The patient is calling saying Dr. Villanueva prescribed her an antibiotic on 01/17/2019 for bronchitis. She says she now has a yeast infection. She is leaving on a plane for the [employer] [facility] tomorrow morning at 6:00 a.m. She wants to know if Dr. Villanueva or someone else can prescribe her something for this. She would like a call back.    Phone Number patient can be reached at:  Home number on file [phone] (home)    Best Time:  Anytime    Can we leave a detailed message on this number:  YES    Call taken on 1/24/2019 at 8:13 AM by Neha Poe

## 2019-01-24 NOTE — TELEPHONE ENCOUNTER
Huddled with RL and he gave verbal okay for Diflucan 150 mg, #2, take one dose today and repeat in 1 week if needed.  Order sent to pharmacy. Patient notified by phone.  Patient verbalized understanding and agreed with plan.      Noemi Villanueva, BARBIE, RN, N  [facility]) [phone]

## 2019-01-24 NOTE — TELEPHONE ENCOUNTER
Vaginal Symptoms  Onset: Yesterday    Description:  Vaginal Discharge: white   Itching (Pruritis): YES  Burning sensation:  no   Odor: no     Accompanying Signs & Symptoms:  Pain with Urination: no   Abdominal Pain: no   Fever: no     History:   Sexually active: no   New Partner: no   Possibility of Pregnancy:  No    Precipitating factors:   Recent Antibiotic Use: YES- Z-dona    Alleviating factors:  None    Therapies Tried and outcome: None      Patient will use our clinic pharmacy.  She is leaving tomorrow morning for [employer].      Will huddle with provider and call patient back.      BARBIE Chamberlain, RN, N  [facility]) [phone]

## 2019-03-10 DIAGNOSIS — G43.109 MIGRAINE WITH AURA: ICD-10-CM

## 2019-03-11 ENCOUNTER — TELEPHONE (OUTPATIENT)
Dept: FAMILY MEDICINE | Facility: CLINIC | Age: 64
End: 2019-03-11

## 2019-03-11 DIAGNOSIS — J45.30 MILD PERSISTENT ASTHMA WITHOUT COMPLICATION: ICD-10-CM

## 2019-03-11 DIAGNOSIS — J45.21 INTERMITTENT ASTHMA, WITH ACUTE EXACERBATION: ICD-10-CM

## 2019-03-11 RX ORDER — VERAPAMIL HYDROCHLORIDE 240 MG/1
TABLET, FILM COATED, EXTENDED RELEASE ORAL
Qty: 90 TABLET | Refills: 0 | Status: SHIPPED | OUTPATIENT
Start: 2019-03-11 | End: 2019-06-17

## 2019-03-11 NOTE — TELEPHONE ENCOUNTER
"Requested Prescriptions   Pending Prescriptions Disp Refills     verapamil ER (CALAN-SR) 240 MG CR tablet [Pharmacy Med Name: VERAPAMIL HCL ER TABS 240MG]  Last Written Prescription Date:  12/10/2018  Last Fill Quantity: 90 tablet,  # refills: 0   Last office visit: 1/17/2019 with prescribing provider:  Kay     Future Office Visit:       90 tablet 0     Sig: TAKE 1 TABLET AT BEDTIME    Calcium Channel Blockers Protocol  Passed - 3/10/2019  6:29 AM       Passed - Blood pressure under 140/90 in past 12 months    BP Readings from Last 3 Encounters:   01/17/19 126/72   11/02/18 121/60   10/26/18 102/72            Passed - Normal ALT in past 12 months    Recent Labs   Lab Test 03/22/18  1307   ALT 21            Passed - Recent (12 mo) or future (30 days) visit within the authorizing provider's specialty    Patient had office visit in the last 12 months or has a visit in the next 30 days with authorizing provider or within the authorizing provider's specialty.  See \"Patient Info\" tab in inbasket, or \"Choose Columns\" in Meds & Orders section of the refill encounter.             Passed - Medication is active on med list       Passed - Patient is age 18 or older       Passed - No active pregnancy on record       Passed - Normal serum creatinine on file in past 12 months    Recent Labs   Lab Test 03/22/18  1307   CR 0.57            Passed - No positive pregnancy test in past 12 months        "

## 2019-03-11 NOTE — LETTER
[facility] - [address] [address]                                                                                                       [phone]    March 18, 2019    Tasneem Chan  [address]  [place] [address]      To Whom it May Concern:    After reviewing your chart, you are due for an updated ACT (Asthma Control Test), which is a questionnaire regarding the level of control of your asthma.     Please fill it out and send it back to me.     Thank you for your time.        Sincerely,       Zayra Villanueva M.D./JOAQUÍN, RN

## 2019-03-11 NOTE — TELEPHONE ENCOUNTER
Prescription approved per [facility] Refill Protocol.  Cherelle Boland RN  [facility]

## 2019-03-12 NOTE — TELEPHONE ENCOUNTER
"Requested Prescriptions   Pending Prescriptions Disp Refills     montelukast (SINGULAIR) 10 MG tablet [Pharmacy Med Name: MONTELUKAST SOD TABS 10MG] 90 tablet 0      Last Written Prescription Date:  12.12.18  Last Fill Quantity: 90 tablet,  # refills: 0   Last office visit: 1/17/2019 with prescribing provider:  Zayra Villanueva MD      Future Office Visit:       Sig: TAKE 1 TABLET DAILY (DUE FOR ACT TESTING FOR FURTHER REFILLS)    Leukotriene Inhibitors Protocol Failed - 3/11/2019 11:40 PM       Failed - Asthma control assessment score within normal limits in last 6 months    Please review ACT score.   ACT Total Scores 7/20/2017 3/22/2018 1/17/2019   ACT TOTAL SCORE - - -   ASTHMA ER VISITS - - -   ASTHMA HOSPITALIZATIONS - - -   ACT TOTAL SCORE (Goal Greater than or Equal to 20) 18 20 14   In the past 12 months, how many times did you visit the emergency room for your asthma without being admitted to the hospital? 0 0 0   In the past 12 months, how many times were you hospitalized overnight because of your asthma? 0 0 0              Passed - Patient is age 12 or older    If patient is under 16, ok to refill using age based dosing.          Passed - Medication is active on med list       Passed - Recent (6 mo) or future (30 days) visit within the authorizing provider's specialty    Patient had office visit in the last 6 months or has a visit in the next 30 days with authorizing provider or within the authorizing provider's specialty.  See \"Patient Info\" tab in inbasket, or \"Choose Columns\" in Meds & Orders section of the refill encounter.            "

## 2019-03-13 NOTE — TELEPHONE ENCOUNTER
Routing refill request to provider for review/approval because:  See ACT score below.  Cherelle Boland RN  [place] [facility]

## 2019-03-14 RX ORDER — MONTELUKAST SODIUM 10 MG/1
TABLET ORAL
Qty: 90 TABLET | Refills: 3 | Status: SHIPPED | OUTPATIENT
Start: 2019-03-14

## 2019-03-18 NOTE — TELEPHONE ENCOUNTER
Letter sent with ACT and self-addressed, stamped, return envelope    Pamela Martin RN  [place] [facility]

## 2019-04-11 NOTE — TELEPHONE ENCOUNTER
ACT Total Scores 3/22/2018 1/17/2019 4/11/2019   ACT TOTAL SCORE - - -   ASTHMA ER VISITS - - -   ASTHMA HOSPITALIZATIONS - - -   ACT TOTAL SCORE (Goal Greater than or Equal to 20) 20 14 17   In the past 12 months, how many times did you visit the emergency room for your asthma without being admitted to the hospital? 0 0 0   In the past 12 months, how many times were you hospitalized overnight because of your asthma? 0 0 0     Pt did not pass ACT. Refill was given per JV note below.  Please advise. ACT placed in south scan pile.     Routing to PCP for further review/recommendations/orders.  Cherelle Boland RN  [facility]

## 2019-04-12 ASSESSMENT — ASTHMA QUESTIONNAIRES: ACT_TOTALSCORE: 17

## 2019-04-12 NOTE — TELEPHONE ENCOUNTER
Attempt #1  Called patient @ [phone] - Left a non-detailed message to call back and speak with any triage nurse.    Pamela Martin RN  [place] [facility]

## 2019-04-12 NOTE — TELEPHONE ENCOUNTER
? Is pt using her Advair 250/50 1 inhalation daily or twice daily?     If so, then need to change up her meds. ? Has she been out of the singulair?   Pt needs to be seen for px was a no show for px 7/2018 and cancelled her 6/2018 px.     Ok for appt in the next 2-3 months.    In the meantime, please offer referral to pulmonary medicine and change to breo ellipta - instead of the advair. Order pended.     ? Does pt feel she needs a course of oral prednisone to help calm things down a bit in the meantime?   60mg po daily for 5 days?

## 2019-04-15 NOTE — TELEPHONE ENCOUNTER
Attempt #2  Called patient @ # below - Unable to LM (phone just rings)    Pamela Martin, RN  [place] [facility]

## 2019-04-17 NOTE — TELEPHONE ENCOUNTER
Attempt #3  Called patient @ # below - Left a non-detailed message to call back and speak with any triage nurse.    Letter sent    Pamela Martin RN  [place] [facility]

## 2019-04-18 NOTE — TELEPHONE ENCOUNTER
Message handled by nurse triage.       PLAN: Huddle with provider, plan includes Need to call patient and relay JV message below as we never reached her to relay message.     ? Is pt using her Advair 250/50 1 inhalation daily or twice daily?      If so, then need to change up her meds. ? Has she been out of the singulair?   Pt needs to be seen for px was a no show for px 7/2018 and cancelled her 6/2018 px.      Ok for appt in the next 2-3 months.    In the meantime, please offer referral to pulmonary medicine and change to breo ellipta - instead of the advair. Order pended.      ? Does pt feel she needs a course of oral prednisone to help calm things down a bit in the meantime?   60mg po daily for 5 days?         Noemi Villanueva, BARBIE, RN, PHN  [facility]) [phone]

## 2019-04-18 NOTE — TELEPHONE ENCOUNTER
ACT Total Scores 1/17/2019 4/11/2019 4/18/2019   ACT TOTAL SCORE - - -   ASTHMA ER VISITS - - -   ASTHMA HOSPITALIZATIONS - - -   ACT TOTAL SCORE (Goal Greater than or Equal to 20) 14 17 17   In the past 12 months, how many times did you visit the emergency room for your asthma without being admitted to the hospital? 0 0 0   In the past 12 months, how many times were you hospitalized overnight because of your asthma? 0 0 0     Routing to PCP for further review/recommendations/orders.    Pamela Martin RN  [facility]

## 2019-04-18 NOTE — TELEPHONE ENCOUNTER
Attempt # 1 to [phone] phone just kept ringing and no VM available.      Attempt  # 2 to  [phone]    Left non-detailed VM for patient to call back and speak with any triage nurse.    BARBIE Chamberlain, RN, PHN  [facility]

## 2019-04-19 RX ORDER — PREDNISONE 20 MG/1
TABLET ORAL
Qty: 26 TABLET | Refills: 0 | Status: SHIPPED | OUTPATIENT
Start: 2019-04-19 | End: 2019-08-03

## 2019-04-19 NOTE — TELEPHONE ENCOUNTER
Message handled by nurse triage.       PLAN: Huddle with provider, plan includes Verbal order given by JV for Breo Ellipta 200/25, 1 inhalation daily and Prednisone 60 mg taper. Also, refer to Pulmonary Medicine. Call if no improvement with these medications.       Patient notified by phone.  Referral information given and patient advised to call and schedule.  Medication orders sent to pharmacy.    Patient verbalized understanding and agreed with plan.      BARBIE Chamberlain, RN, N  [facility]) [phone]

## 2019-04-29 ENCOUNTER — TRANSFERRED RECORDS (OUTPATIENT)
Dept: HEALTH INFORMATION MANAGEMENT | Facility: CLINIC | Age: 64
End: 2019-04-29

## 2019-05-25 ENCOUNTER — NURSE TRIAGE (OUTPATIENT)
Dept: NURSING | Facility: CLINIC | Age: 64
End: 2019-05-25

## 2019-05-25 NOTE — TELEPHONE ENCOUNTER
Pt thinks she has pink eye. Would like medication for this. Mild itching began yesterday, with yellow discharge today. Grand child has pink eye. I offered [email or web address], but she is 62 yo above the age guidelines for Pink eye treatment. She said the [place] clinic is open, wants to be transferred there on a back line. Tried to do this with back line and triage numbers for clinic without success. Scheduling says no appts available at clinic today. Advised [facility].     Eva Ross RN, [facility]      Reason for Disposition    [1] Eye with yellow/green discharge or eyelashes stick together AND [2] NO PCP standing order to call in antibiotic eye drops    Additional Information    Negative: Eye exposure to chemical or fumes    Negative: Redness of white of eye (sclera), but no pus or only a small amount of brief pus    Negative: SEVERE eye pain (e.g., excruciating)    Negative: [1] Eyelids are very swollen (shut or almost) AND [2] fever    Negative: [1] Eyelid (outer) is very red (or tender to touch) AND [2] fever    Negative: Patient sounds very sick or weak to the triager    Negative: MODERATE eye pain (e.g., interferes with normal activities)    Negative: Fever > 104 F (40 C)    Negative: Cloudy spot or sore seen on the cornea (clear part of the eye)    Negative: Blurred vision    Negative: Eye is very swollen (shut or almost)    Negative: [1] MILD eye pain or discomfort AND [2] wears contacts    Negative: Eyelid is red and painful (or tender to touch)    Negative: Discharge from penis    Negative: New or abnormal vaginal discharge    Negative: Fever present > 3 days (72 hours)    Negative: [1] Lots of yellow or green nasal discharge AND [2] present now AND [3] fever    Negative: Weak immune system (e.g., HIV positive, cancer chemo, splenectomy, organ transplant, chronic steroids)    Protocols used: EYE - PUS OR [id number]-C-LE

## 2019-06-07 ENCOUNTER — TELEPHONE (OUTPATIENT)
Dept: FAMILY MEDICINE | Facility: CLINIC | Age: 64
End: 2019-06-07

## 2019-06-07 DIAGNOSIS — G43.109 MIGRAINE WITH AURA: ICD-10-CM

## 2019-06-10 NOTE — TELEPHONE ENCOUNTER
"Requested Prescriptions   Pending Prescriptions Disp Refills     verapamil ER (CALAN-SR) 240 MG CR tablet [Pharmacy Med Name: VERAPAMIL HCL ER TABS 240MG]      Last Written Prescription Date:  3.11.19  Last Fill Quantity: 90 tablet,  # refills: 0   Last office visit: 1/17/2019 with prescribing provider:  Zayra Villanueva MD         Future Office Visit:       90 tablet 0     Sig: TAKE 1 TABLET AT BEDTIME       Calcium Channel Blockers Protocol  Failed - 6/7/2019 11:54 PM        Failed - Normal ALT in past 12 months     Recent Labs   Lab Test 03/22/18  1307   ALT 21             Failed - Normal serum creatinine on file in past 12 months     Recent Labs   Lab Test 03/22/18  1307   CR 0.57             Passed - Blood pressure under 140/90 in past 12 months     BP Readings from Last 3 Encounters:   01/17/19 126/72   11/02/18 121/60   10/26/18 102/72                 Passed - Recent (12 mo) or future (30 days) visit within the authorizing provider's specialty     Patient had office visit in the last 12 months or has a visit in the next 30 days with authorizing provider or within the authorizing provider's specialty.  See \"Patient Info\" tab in inbasket, or \"Choose Columns\" in Meds & Orders section of the refill encounter.              Passed - Medication is active on med list        Passed - Patient is age 18 or older        Passed - No active pregnancy on record        Passed - No positive pregnancy test in past 12 months        "

## 2019-06-10 NOTE — TELEPHONE ENCOUNTER
Routing refill request to provider for review/approval because:  Labs not current:  See below.    Noemi Villanueva, BS, RN, PHN  [facility]) [phone]

## 2019-06-11 RX ORDER — VERAPAMIL HYDROCHLORIDE 240 MG/1
TABLET, FILM COATED, EXTENDED RELEASE ORAL
Qty: 30 TABLET | Refills: 0 | OUTPATIENT
Start: 2019-06-11

## 2019-06-11 NOTE — TELEPHONE ENCOUNTER
Please call patient to schedule and then we can refill.  Pharmacy advised that appointment needed.    Noemi Villanueva, BARBIE, RN, N  [facility]) [phone]

## 2019-06-14 NOTE — TELEPHONE ENCOUNTER
Attempt #1  Called patient @ [phone] - Left a non-detailed message to call back.    If patient calls back, please schedule a FASTING PHYSICAL and route back to RN team.       Pamela Martin RN  [facility]

## 2019-06-14 NOTE — TELEPHONE ENCOUNTER
Patient returning phone call. Patient states that she is only available on Saturdays and will only see Dr. Villanueva. Fasting physical has been scheduled for 11/16/19 at 8:20 AM.    Quita DANG  Patient Representative - [facility]

## 2019-06-17 RX ORDER — VERAPAMIL HYDROCHLORIDE 240 MG/1
240 TABLET, FILM COATED, EXTENDED RELEASE ORAL AT BEDTIME
Qty: 90 TABLET | Refills: 0 | Status: SHIPPED | OUTPATIENT
Start: 2019-06-17 | End: 2019-09-15

## 2019-06-17 NOTE — TELEPHONE ENCOUNTER
90 day supply approved per LP note below.      Noemi Villanueva, BS, RN, PHN  [facility]) [phone]

## 2019-08-03 ENCOUNTER — OFFICE VISIT (OUTPATIENT)
Dept: FAMILY MEDICINE | Facility: CLINIC | Age: 64
End: 2019-08-03
Payer: COMMERCIAL

## 2019-08-03 VITALS
TEMPERATURE: 98.4 F | OXYGEN SATURATION: 95 % | BODY MASS INDEX: 31.55 KG/M2 | DIASTOLIC BLOOD PRESSURE: 72 MMHG | HEIGHT: 67 IN | SYSTOLIC BLOOD PRESSURE: 102 MMHG | WEIGHT: 201 LBS | HEART RATE: 96 BPM

## 2019-08-03 DIAGNOSIS — J06.9 UPPER RESPIRATORY TRACT INFECTION, UNSPECIFIED TYPE: Primary | ICD-10-CM

## 2019-08-03 DIAGNOSIS — J45.998 ASTHMA NIGHT-TIME SYMPTOMS: ICD-10-CM

## 2019-08-03 PROCEDURE — 99213 OFFICE O/P EST LOW 20 MIN: CPT | Performed by: PHYSICIAN ASSISTANT

## 2019-08-03 RX ORDER — AMOXICILLIN 875 MG
875 TABLET ORAL 2 TIMES DAILY
Qty: 20 TABLET | Refills: 0 | Status: SHIPPED | OUTPATIENT
Start: 2019-08-03 | End: 2020-02-13

## 2019-08-03 ASSESSMENT — ANXIETY QUESTIONNAIRES
GAD7 TOTAL SCORE: 4
IF YOU CHECKED OFF ANY PROBLEMS ON THIS QUESTIONNAIRE, HOW DIFFICULT HAVE THESE PROBLEMS MADE IT FOR YOU TO DO YOUR WORK, TAKE CARE OF THINGS AT HOME, OR GET ALONG WITH OTHER PEOPLE: SOMEWHAT DIFFICULT
6. BECOMING EASILY ANNOYED OR IRRITABLE: NOT AT ALL
3. WORRYING TOO MUCH ABOUT DIFFERENT THINGS: SEVERAL DAYS
2. NOT BEING ABLE TO STOP OR CONTROL WORRYING: SEVERAL DAYS
5. BEING SO RESTLESS THAT IT IS HARD TO SIT STILL: NOT AT ALL
1. FEELING NERVOUS, ANXIOUS, OR ON EDGE: SEVERAL DAYS
7. FEELING AFRAID AS IF SOMETHING AWFUL MIGHT HAPPEN: SEVERAL DAYS

## 2019-08-03 ASSESSMENT — PATIENT HEALTH QUESTIONNAIRE - PHQ9
SUM OF ALL RESPONSES TO PHQ QUESTIONS 1-9: 6
5. POOR APPETITE OR OVEREATING: NOT AT ALL

## 2019-08-03 ASSESSMENT — MIFFLIN-ST. JEOR: SCORE: 1501.42

## 2019-08-03 NOTE — LETTER
My Asthma Action Plan  Name: Tasneem Chan   YOB: 1955  Date: 8/3/2019   My doctor: Autumn Paige PA-C   My clinic: [facility]        My Control Medicine: Advair and Singulair  My Rescue Medicine: Albuterol    My Asthma Severity: intermittent  Avoid your asthma triggers: smoke, upper respiratory infections, dust mites, pollens, animal dander, insects/rodents, mold, humidity, aspirin, strong odors and fumes, occupational exposure, exercise or sports, emotions, cold air and Gastric Reflux                  GREEN ZONE   Good Control    I feel good    No cough or wheeze    Can work, sleep and play without asthma symptoms       Take your asthma control medicine every day.     1. If exercise triggers your asthma, take your rescue medication    15 minutes before exercise or sports, and    During exercise if you have asthma symptoms  2. Spacer to use with inhaler: If you have a spacer, make sure to use it with your inhaler             YELLOW ZONE Getting Worse  I have ANY of these:    I do not feel good    Cough or wheeze    Chest feels tight    Wake up at night   1. Keep taking your Green Zone medications  2. Start taking your rescue medicine:    every 20 minutes for up to 1 hour. Then every 4 hours for 24-48 hours.  3. If you stay in the Yellow Zone for more than 12-24 hours, contact your doctor.  4. If you do not return to the Green Zone in 12-24 hours or you get worse, start taking your oral steroid medicine if prescribed by your provider.           RED ZONE Medical Alert - Get Help  I have ANY of these:    I feel awful    Medicine is not helping    Breathing getting harder    Trouble walking or talking    Nose opens wide to breathe       1. Take your rescue medicine NOW  2. If your provider has prescribed an oral steroid medicine, start taking it NOW  3. Call your doctor NOW  4. If you are still in the Red Zone after 20 minutes and you have not reached your doctor:    Take  your rescue medicine again and    Call 911 or go to the emergency room right away    See your regular doctor within 2 weeks of an Emergency Room or Urgent Care visit for follow-up treatment.          Annual Reminders:  Meet with Asthma Educator,  Flu Shot in the Fall, consider Pneumonia Vaccination for patients with asthma (aged 19 and older).    Pharmacy:    [facility] - [place], [place] - [address]  [facility]ING ONLY - [place], [place]  [facility] - [place], [place] - [address] [place] [address]                      Asthma Triggers  How To Control Things That Make Your Asthma Worse    Triggers are things that make your asthma worse.  Look at the list below to help you find your triggers and what you can do about them.  You can help prevent asthma flare-ups by staying away from your triggers.      Trigger                                                          What you can do   Cigarette Smoke  Tobacco smoke can make asthma worse. Do not allow smoking in your home, car or around you.  Be sure no one smokes at a child s day care or school.  If you smoke, ask your health care provider for ways to help you quit.  Ask family members to quit too.  Ask your health care provider for a referral to Quit Plan to help you quit smoking, or call 0-024-027-PLAN.     Colds, Flu, Bronchitis  These are common triggers of asthma. Wash your hands often.  Don t touch your eyes, nose or mouth.  Get a flu shot every year.     Dust Mites  These are tiny bugs that live in cloth or carpet. They are too small to see. Wash sheets and blankets in hot water every week.   Encase pillows and mattress in dust mite proof covers.  Avoid having carpet if you can. If you have carpet, vacuum weekly.   Use a dust mask and HEPA vacuum.   Pollen and Outdoor Mold  Some people are allergic to trees, grass, or weed pollen, or molds. Try to keep your windows  closed.  Limit time out doors when pollen count is high.   Ask you health care provider about taking medicine during allergy season.     Animal Dander  Some people are allergic to skin flakes, urine or saliva from pets with fur or feathers. Keep pets with fur or feathers out of your home.    If you can t keep the pet outdoors, then keep the pet out of your bedroom.  Keep the bedroom door closed.  Keep pets off cloth furniture and away from stuffed toys.     Mice, Rats, and Cockroaches  Some people are allergic to the waste from these pests.   Cover food and garbage.  Clean up spills and food crumbs.  Store grease in the refrigerator.   Keep food out of the bedroom.   Indoor Mold  This can be a trigger if your home has high moisture. Fix leaking faucets, pipes, or other sources of water.   Clean moldy surfaces.  Dehumidify basement if it is damp and smelly.   Smoke, Strong Odors, and Sprays  These can reduce air quality. Stay away from strong odors and sprays, such as perfume, powder, hair spray, paints, smoke incense, paint, cleaning products, candles and new carpet.   Exercise or Sports  Some people with asthma have this trigger. Be active!  Ask your doctor about taking medicine before sports or exercise to prevent symptoms.    Warm up for 5-10 minutes before and after sports or exercise.     Other Triggers of Asthma  Cold air:  Cover your nose and mouth with a scarf.  Sometimes laughing or crying can be a trigger.  Some medicines and food can trigger asthma.

## 2019-08-03 NOTE — PROGRESS NOTES
Subjective     Tasneem Cahn is a 63 year old female who presents to clinic today for the following health issues:    HPI   Acute Illness   Acute illness concerns: Sinus problem  Has not had a sinus infection in a long time.  Has to fly on Tuesday to [place] to take care of her mom. She and sister trade off.  Thought it was originally allergies, but then progressed and now has more facial pressure and sinus headache.  No teeth pain or fevers.  Hx of asthma - reports good compliance with regimen of breo, albuterol, singulair and zyrtec. Does not feel like it has impacted her asthma yet.    Onset: x 4 days - is flying on tuesdays     Fever: no     Chills/Sweats: no     Headache (location?): YES    Sinus Pressure:YES- tender and facial pain    Conjunctivitis:  no    Ear Pain: YES: left    Rhinorrhea: no     Congestion: no     Sore Throat: no      Cough: no    Wheeze: YES- a little bit at night - does use inhalers. Only happens once in awhile, but more of a chronic issue. Was evaluated by her asthma specialist and was switched from advair to breo. Had additional pulmonary testing that was normal so was advised to take protonix for possible GERD, but pt does not feel she had this. Filled script, but never took after reading side effect profile. Does have a CPAP machine and has already been using this consistently.    Decreased Appetite: no     Nausea: no     Vomiting: no     Diarrhea:  no     Dysuria/Freq.: no     Fatigue/Achiness: no     Sick/Strep Exposure: possibly- does fly a lot back and forth from [place] helping take care of mother     Therapies Tried and outcome: Tylenol sinus- flonase    Patient Active Problem List   Diagnosis     Malignant neoplasm of female breast (H)- in 2003 - right breast -s/p lumpectomy with radiation, tamoxifen     Intractable chronic migraine without aura and without status migrainosus     Irritable bowel syndrome     GERD (GASTROESOPHAGEAL REFLUX DISEASE)- much improved with  diet     Intermittent asthma     Mild persistent asthma     Hearing loss, sensorineural, high frequency, right- from right 8th cranial nerve tumor (meningioma)     Left ankle injury, initial encounter     Left knee pain     Advanced directives, counseling/discussion     Benign neoplasm of cranial nerve (H)     Blood pressure elevated without history of HTN     New daily persistent headache     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 distance      Meningioma (H)- s/p removal /craniotomy at [facility] in       Past Surgical History:   Procedure Laterality Date     BREAST LUMPECTOMY, RT/LT      RT     C EXCIS INFRATENT MENINGIOMA      meningioma excised from 8th cranial nerve right side      SECTION       CRANIOTOMY, EXCISE TUMOR COMPLEX, COMBINED  2014    CraniotomyNotes: Suboccipital Tumor resection - Meningioma     SURGICAL HISTORY OF -       Rt Knee spur removal       Social History     Tobacco Use     Smoking status: Never Smoker     Smokeless tobacco: Never Used   Substance Use Topics     Alcohol use: No     Alcohol/week: 0.0 oz     Family History   Problem Relation Age of Onset     Cardiovascular Mother         rheumatic fever, irregular rhythm     Pulmonary Embolism Father 61        2 weeks after hernia surgery     Heart Disease Sister         heart block         Current Outpatient Medications   Medication Sig Dispense Refill     Multiple Vitamins-Minerals (MULTIVITAMIN ADULT PO) Take 1 tablet by mouth every 24 hours       ACETAMINOPHEN PO Take by mouth as needed for pain       albuterol (PROAIR HFA) 108 (90 Base) MCG/ACT inhaler USE 1 TO 2 INHALATIONS EVERY 4 TO 6 HOURS AS NEEDED 25.5 g 1     albuterol (PROVENTIL) (2.5 MG/3ML) 0.083% neb solution Take 1 vial (2.5 mg) by nebulization every 6 hours as needed for shortness of breath / dyspnea or wheezing 25 vial 11      "cetirizine (ZYRTEC) 10 MG tablet Take 1 tablet by mouth daily. 30 tablet 11     cyclobenzaprine (FLEXERIL) 10 MG tablet 1/2 tab during the day and 1 tab at bedtime as needed for muscle spasms 20 tablet 0     diclofenac (VOLTAREN) 1 % GEL topical gel Place 4 g onto the skin 3 times daily as needed for moderate pain 1 Tube 1     fluticasone (FLONASE) 50 MCG/ACT spray USE 1 TO 2 SPRAYS IN EACH NOSTRIL DAILY 16 g 1     fluticasone-vilanterol (BREO ELLIPTA) 200-25 MCG/INH inhaler Inhale 1 puff into the lungs daily 1 Inhaler 3     gabapentin (NEURONTIN) 300 MG capsule TAKE 1 CAPSULE TWICE A  capsule 1     gabapentin (NEURONTIN) 300 MG capsule Take 1 capsule (300 mg) by mouth 2 times daily 180 capsule 1     montelukast (SINGULAIR) 10 MG tablet TAKE 1 TABLET DAILY (DUE FOR ACT TESTING FOR FURTHER REFILLS) 90 tablet 3     SUMAtriptan (IMITREX) 100 MG tablet TAKE AS INSTRUCTED BY YOUR PRESCRIBER 27 tablet 1     verapamil ER (CALAN-SR) 240 MG CR tablet Take 1 tablet (240 mg) by mouth At Bedtime 90 tablet 0     Allergies   Allergen Reactions     Levaquin [Levofloxacin Hemihydrate]      diarrhea       Reviewed and updated as needed this visit by Provider  Tobacco  Allergies  Meds  Med Hx  Surg Hx  Fam Hx  Soc Hx        Review of Systems   ROS COMP: Constitutional, HEENT, cardiovascular, pulmonary, gi and gu systems are negative, except as otherwise noted.      Objective    /72 (BP Location: Right arm, Patient Position: Sitting, Cuff Size: Adult Regular)   Pulse 96   Temp 98.4  F (36.9  C) (Oral)   Ht 1.689 m (5' 6.5\")   Wt 91.2 kg (201 lb)   SpO2 95%   BMI 31.96 kg/m    Body mass index is 31.96 kg/m .  Physical Exam   GENERAL: healthy, alert and no distress  EYES: Eyes grossly normal to inspection, PERRL and conjunctivae and sclerae normal  HENT: ear canals and TM's normal, nose with mild congestion and patient endorses bilateral maxillary sinus tenderness to tapping L>R. Mouth without ulcers or " lesions  NECK: no adenopathy and no asymmetry, masses, or scars  RESP: lungs clear to auscultation - no rales, rhonchi or wheezes  CV: regular rates and rhythm and no murmur, click or rub    Diagnostic Test Results:  Labs reviewed in Epic        Assessment & Plan       ICD-10-CM    1. Upper respiratory tract infection, unspecified type J06.9 amoxicillin (AMOXIL) 875 MG tablet   2. Asthma night-time symptoms - Possible GERD versus ensure sufficient management of SUSHIL. J45.998    Main concern was to consider abx therapy as leaving to fly out of town next week to care for her mother who lives in [place]. Let her know that given only 4 days of symptoms is more reflective of a viral URI at this time. Pt still wished to consider abx therapy in case of worsening so this was still provided to her, but only advised to take if not improving over next 7-10 days. She had also mentioned chronic intermittent night wakening with feeling wheezy/SOB and reports all normal pulmonary testing with her specialist. We reviewed how silent GERD could be contributing and pt is now amenable to completing trial of protonix as previously suggested by them. She was also encouraged to ensure her SUSHIL is being optimally managed so it's been awhile since this was checked.  See Patient Instructions  Patient in agreement with plan.     Patient Instructions   At this point, history/exam is most consistent with a viral infection.  Treatment is supportive.  Given you're going out of town next week, did provide antibiotic therapy due to patients concern of worsening whilst traveling.  Risks/appropriate use of antibiotic therapy reviewed.      Return in about 1 month (around 9/3/2019) for recheck with asthma/sleep med specialist as planned.    Autumn Paige PA-C  [facility]

## 2019-08-03 NOTE — PATIENT INSTRUCTIONS
At this point, history/exam is most consistent with a viral infection.  Treatment is supportive.  Given you're going out of town next week, did provide antibiotic therapy due to patients concern of worsening whilst traveling.  Risks/appropriate use of antibiotic therapy reviewed.

## 2019-08-04 ASSESSMENT — ASTHMA QUESTIONNAIRES: ACT_TOTALSCORE: 16

## 2019-08-04 ASSESSMENT — ANXIETY QUESTIONNAIRES: GAD7 TOTAL SCORE: 4

## 2019-09-15 DIAGNOSIS — G43.109 MIGRAINE WITH AURA: ICD-10-CM

## 2019-09-16 RX ORDER — VERAPAMIL HYDROCHLORIDE 240 MG/1
TABLET, FILM COATED, EXTENDED RELEASE ORAL
Qty: 90 TABLET | Refills: 0 | Status: SHIPPED | OUTPATIENT
Start: 2019-09-16 | End: 2019-12-15

## 2019-09-16 NOTE — TELEPHONE ENCOUNTER
Medication is being filled for 1 time refill only due to:  Patient needs to be seen because due for physical/labs.    Pamela Martin RN  [facility]

## 2019-09-16 NOTE — TELEPHONE ENCOUNTER
"Requested Prescriptions   Pending Prescriptions Disp Refills     verapamil ER (CALAN-SR) 240 MG CR tablet [Pharmacy Med Name: VERAPAMIL HCL ER TABS 240MG]          Last Written Prescription Date:  6.17.19  Last Fill Quantity: 90 tablet,  # refills: 0   Last office visit: 8/3/2019 with prescribing provider:  Zayra Villanueva MD               Future Office Visit:   Next 5 appointments (look out 90 days)    Nov 16, 2019  8:20 AM CST  [facility]YS[facility] [address] [place] [id number] [phone]            90 tablet 4     Sig: TAKE 1 TABLET AT BEDTIME       Calcium Channel Blockers Protocol  Failed - 9/15/2019  6:28 AM        Failed - Normal ALT in past 12 months     Recent Labs   Lab Test 03/22/18  1307   ALT 21             Failed - Normal serum creatinine on file in past 12 months     Recent Labs   Lab Test 03/22/18  1307   CR 0.57             Passed - Blood pressure under 140/90 in past 12 months     BP Readings from Last 3 Encounters:   08/03/19 102/72   01/17/19 126/72   11/02/18 121/60                 Passed - Recent (12 mo) or future (30 days) visit within the authorizing provider's specialty     Patient had office visit in the last 12 months or has a visit in the next 30 days with authorizing provider or within the authorizing provider's specialty.  See \"Patient Info\" tab in inbasket, or \"Choose Columns\" in Meds & Orders section of the refill encounter.              Passed - Medication is active on med list        Passed - Patient is age 18 or older        Passed - No active pregnancy on record        Passed - No positive pregnancy test in past 12 months        "

## 2019-11-12 ENCOUNTER — TELEPHONE (OUTPATIENT)
Dept: FAMILY MEDICINE | Facility: CLINIC | Age: 64
End: 2019-11-12

## 2019-11-12 NOTE — TELEPHONE ENCOUNTER
Patient calling, originally had an appt this 11/16/19 with Dr. Villanueva for her physical., but she had to cancel her flight. She would to see Dr. Villanueva on a Saturday only, if you could notify her the next Saturday that is available to schedule  Cell: [phone]  Ok to leave detailed message: yes   Thank you  Eladia Vanegas

## 2019-12-10 ENCOUNTER — OFFICE VISIT (OUTPATIENT)
Dept: FAMILY MEDICINE | Facility: CLINIC | Age: 64
End: 2019-12-10
Payer: COMMERCIAL

## 2019-12-10 VITALS
HEIGHT: 67 IN | WEIGHT: 203 LBS | HEART RATE: 85 BPM | TEMPERATURE: 96.7 F | BODY MASS INDEX: 31.86 KG/M2 | SYSTOLIC BLOOD PRESSURE: 108 MMHG | DIASTOLIC BLOOD PRESSURE: 70 MMHG | OXYGEN SATURATION: 96 %

## 2019-12-10 DIAGNOSIS — G47.00 INSOMNIA, UNSPECIFIED TYPE: Primary | ICD-10-CM

## 2019-12-10 PROCEDURE — 99213 OFFICE O/P EST LOW 20 MIN: CPT | Performed by: NURSE PRACTITIONER

## 2019-12-10 RX ORDER — ZALEPLON 5 MG/1
5 CAPSULE ORAL AT BEDTIME
Qty: 30 CAPSULE | Refills: 0 | Status: SHIPPED | OUTPATIENT
Start: 2019-12-10

## 2019-12-10 ASSESSMENT — MIFFLIN-ST. JEOR: SCORE: 1505.49

## 2019-12-10 NOTE — PROGRESS NOTES
"Subjective   Tasneem Chan is a 64 year old female who presents to clinic today for the following health issues:    HPI   Insomnia  Onset: x 8 months     Description:   Time to fall asleep (sleep latency): Pt reports having trouble falling asleep, would be awake for 2 or more hours before falling asleep.   Middle of night awakening:  no   Early morning awakening:  no     Progression of Symptoms:  Worsening over time     Accompanying Signs & Symptoms:  Daytime sleepiness/napping: YES  Excessive snoring/apnea: YES- cpap machine   Restless legs: no   Frequent urination: no   Chronic pain:  no    History:  Prior Insomnia: YES- pt reports hx of having a hard time getting to sleep but has gotten worse.     Precipitating factors:   New stressful situation: no  Caffeine intake: no   OTC decongestants: no  Any new medications: no    Alleviating factors:  Self medicating (alcohol, etc.):  YES, tylenol pm and melatonin 10mg     Therapies Tried and outcome: Preventive measures like not reading kenton before bed and not drinking fluids before bed.     Has tried zolpidem and trazodone in the past without much help. Once she is alseep no problem but lots of trouble getting to sleep. Recent stress and travel related to her aging mother. This is resolved now.     Reviewed and updated as needed this visit by provider:  Tobacco  Allergies  Meds  Problems  Med Hx  Surg Hx  Fam Hx         Review of Systems   Constitutional, HEENT, cardiovascular, pulmonary, GI, , musculoskeletal, neuro, skin, endocrine and psych systems are negative, except as otherwise noted per HPI.      Objective   /70   Pulse 85   Temp 96.7  F (35.9  C) (Tympanic)   Ht 1.689 m (5' 6.5\")   Wt 92.1 kg (203 lb)   SpO2 96%   Breastfeeding No   BMI 32.27 kg/m   Body mass index is 32.27 kg/m .  Physical Exam   GENERAL: healthy, alert, well nourished, well hydrated, no distress  RESP: lungs clear to auscultation - no rales, no rhonchi, no " "wheezes  CV: regular rates and rhythm, normal S1 S2, no S3 or S4 and no murmur, no click or rub -  NEURO: strength and tone- normal, sensory exam- grossly normal, mentation- intact, speech- normal, reflexes- symmetric  PSYCH: Alert and oriented times 3; speech- coherent , normal rate and volume; able to articulate logical thoughts, able to abstract reason, no tangential thoughts, no hallucinations or delusions, affect- normal          Assessment & Plan   Tasneem was seen today for sleep problem.    Diagnoses and all orders for this visit:    Insomnia, unspecified type  -     zaleplon (SONATA) 5 MG capsule; Take 1 capsule (5 mg) by mouth At Bedtime      Discussed options, neither sleep aid tried before was helpful to her. No underlying mood issues, just sleep rhythm issue due to stress and travel. Trial sonata, discussed ideally we would use for a short time and try her back off of this. Return in 1 month for medication check. Sooner if side effects.      BMI:   Estimated body mass index is 31.96 kg/m  as calculated from the following:    Height as of 8/3/19: 1.689 m (5' 6.5\").    Weight as of 8/3/19: 91.2 kg (201 lb).           See Patient Instructions    Return in about 1 month (around 1/10/2020) for Medication Managment Visit.            NIRMAL Hernandez     [address] [address]  [email or web address]   Office: [phone]           "

## 2019-12-13 ENCOUNTER — TELEPHONE (OUTPATIENT)
Dept: FAMILY MEDICINE | Facility: CLINIC | Age: 64
End: 2019-12-13

## 2019-12-13 NOTE — TELEPHONE ENCOUNTER
She could try 10 mg instead and see if more helpful. That is maximum and so if not working should discuss other options.

## 2019-12-13 NOTE — TELEPHONE ENCOUNTER
Called patient @ # below -     Advised of notes below - Patient stated an understanding and agreed with plan.    Pamela Martin RN  [facility]

## 2019-12-13 NOTE — TELEPHONE ENCOUNTER
Patient saw IRAM Medina NP on 12/10/2019 for Insomnia  Insomnia, unspecified type  -     zaleplon (SONATA) 5 MG capsule; Take 1 capsule (5 mg) by mouth At Bedtime      Routing to AS, NP for further review/recommendations/orders.    Pamela Martin RN  [facility]

## 2019-12-13 NOTE — TELEPHONE ENCOUNTER
Reason for Call:  Other prescription    Detailed comments: The patient is calling saying she was prescribed zaleplon (Sonata) and it's not doing anything to help her sleep. She would like a call back to see if she can increase her dose.     Phone Number Patient can be reached at: Home number on file [phone] (home)    Best Time: Anytime    Can we leave a detailed message on this number? YES    Call taken on 12/13/2019 at 12:21 PM by Neha Poe

## 2019-12-15 DIAGNOSIS — G43.109 MIGRAINE WITH AURA AND WITHOUT STATUS MIGRAINOSUS, NOT INTRACTABLE: ICD-10-CM

## 2019-12-15 DIAGNOSIS — J45.21 INTERMITTENT ASTHMA, WITH ACUTE EXACERBATION: ICD-10-CM

## 2019-12-15 DIAGNOSIS — J45.30 MILD PERSISTENT ASTHMA WITHOUT COMPLICATION: ICD-10-CM

## 2019-12-16 NOTE — TELEPHONE ENCOUNTER
"Requested Prescriptions   Pending Prescriptions Disp Refills     BREO ELLIPTA 200-25 MCG/INH Inhaler [Pharmacy Med Name: BREO ELLIPTA 200-25MCG/INH AEPB]          Last Written Prescription Date:  4.19.19  Last Fill Quantity: 1 inhaler,  # refills: 3   Last office visit: 12/10/2019 with prescribing provider:  Zayra Villanueva MD           Future Office Visit:   Next 5 appointments (look out 90 days)    Feb 01, 2020  8:40 AM CST  PHYSICAL with Zayra Villanueva MD  [facility] [address] [place] [id number] [phone]             3     Sig: INHALE ONE PUFF BY MOUTH ONCE DAILY       Inhaled Steroids Protocol Failed - 12/15/2019  5:03 AM        Failed - Asthma control assessment score within normal limits in last 6 months     Please review ACT score.     ACT Total Scores 4/11/2019 4/18/2019 8/3/2019   ACT TOTAL SCORE - - -   ASTHMA ER VISITS - - -   ASTHMA HOSPITALIZATIONS - - -   ACT TOTAL SCORE (Goal Greater than or Equal to 20) 17 17 16   In the past 12 months, how many times did you visit the emergency room for your asthma without being admitted to the hospital? 0 0 0   In the past 12 months, how many times were you hospitalized overnight because of your asthma? 0 0 0               Passed - Patient is age 12 or older        Passed - Medication is active on med list        Passed - Recent (6 mo) or future (30 days) visit within the authorizing provider's specialty     Patient had office visit in the last 6 months or has a visit in the next 30 days with authorizing provider or within the authorizing provider's specialty.  See \"Patient Info\" tab in inbasket, or \"Choose Columns\" in Meds & Orders section of the refill encounter.            "

## 2019-12-16 NOTE — TELEPHONE ENCOUNTER
"Requested Prescriptions   Pending Prescriptions Disp Refills     verapamil ER (CALAN-SR) 240 MG CR tablet [Pharmacy Med Name: VERAPAMIL HCL ER TABS 240MG]        Last Written Prescription Date:  6.16.19  Last Fill Quantity: 90 tablet,  # refills: 0   Last office visit: 12/10/2019 with prescribing provider:  Zayra Villanueva MD           Future Office Visit:   Next 5 appointments (look out 90 days)    Feb 01, 2020  8:40 AM CST  PHYSICAL with Zayra [facility]ueva MD  [facility] [address] [place] [id number] [phone]            90 tablet 4     Sig: TAKE 1 TABLET AT BEDTIME (DUE FOR AN OFFICE VISIT FOR FURTHER REFILLS)       Calcium Channel Blockers Protocol  Failed - 12/15/2019  5:54 AM        Failed - Normal ALT in past 12 months     Recent Labs   Lab Test 03/22/18  1307   ALT 21             Failed - Normal serum creatinine on file in past 12 months     Recent Labs   Lab Test 03/22/18  1307   CR 0.57             Passed - Blood pressure under 140/90 in past 12 months     BP Readings from Last 3 Encounters:   12/10/19 108/70   08/03/19 102/72   01/17/19 126/72                 Passed - Recent (12 mo) or future (30 days) visit within the authorizing provider's specialty     Patient has had an office visit with the authorizing provider or a provider within the authorizing providers department within the previous 12 mos or has a future within next 30 days. See \"Patient Info\" tab in inbasket, or \"Choose Columns\" in Meds & Orders section of the refill encounter.              Passed - Medication is active on med list        Passed - Patient is age 18 or older        Passed - No active pregnancy on record        Passed - No positive pregnancy test in past 12 months        "

## 2019-12-17 NOTE — TELEPHONE ENCOUNTER
Routing refill request to provider for review/approval because:  Labs out of range:  BARBIE ShawN, RN  [facility]

## 2019-12-17 NOTE — TELEPHONE ENCOUNTER
Routing refill request to provider for review/approval because:  Labs not current:  ALT, Cr  Patient has appointment scheduled with provider on 2/1/2020.    BARBIE PyleN, RN  [facility]

## 2019-12-18 RX ORDER — VERAPAMIL HYDROCHLORIDE 240 MG/1
TABLET, FILM COATED, EXTENDED RELEASE ORAL
Qty: 90 TABLET | Refills: 4 | Status: SHIPPED | OUTPATIENT
Start: 2019-12-18 | End: 2020-06-15

## 2019-12-19 ENCOUNTER — TELEPHONE (OUTPATIENT)
Dept: FAMILY MEDICINE | Facility: CLINIC | Age: 64
End: 2019-12-19

## 2019-12-19 DIAGNOSIS — G47.00 INSOMNIA, UNSPECIFIED TYPE: ICD-10-CM

## 2019-12-19 RX ORDER — ZALEPLON 5 MG/1
10 CAPSULE ORAL AT BEDTIME
Qty: 180 CAPSULE | Refills: 0 | Status: CANCELLED | OUTPATIENT
Start: 2019-12-19

## 2019-12-19 NOTE — TELEPHONE ENCOUNTER
Routing refill request to provider for review/approval because:  See note below - patient stated taking 2 tabs daily now      Pamela Martin RN  [facility]

## 2019-12-19 NOTE — TELEPHONE ENCOUNTER
Reason for Call:  Medication or medication refill:    Do you use a [facility]?  Name of the pharmacy and phone number for the current request:  [facility] - [phone]    Name of the medication requested: zaleplon (SONATA) 5 MG capsule    Other request: Patient now takes 2 per day so needs a new RX for more    Can we leave a detailed message on this number? YES    Phone number patient can be reached at: Home number on file [phone] (home)    Best Time: any    Call taken on 12/19/2019 at 9:34 AM by Celia Braswell

## 2019-12-19 NOTE — TELEPHONE ENCOUNTER
Requested Prescriptions   Pending Prescriptions Disp Refills     zaleplon (SONATA) 5 MG capsule          Last Written Prescription Date:  12.10.19  Last Fill Quantity: 30 capsule,  # refills: 0   Last office visit: 12/10/2019 with prescribing provider:  Zayra Villanueva MD           Future Office Visit:   Next 5 appointments (look out 90 days)    Feb 01, 2020  8:40 AM CST  PHYSICAL with Zayra Villanueva MD  [facility] ([facility]) [address] [place] [id number]  [phone]            30 capsule 0     Sig: Take 1 capsule (5 mg) by mouth At Bedtime       There is no refill protocol information for this order

## 2019-12-20 RX ORDER — ZALEPLON 10 MG/1
10 CAPSULE ORAL AT BEDTIME
Qty: 21 CAPSULE | Refills: 0 | Status: SHIPPED | OUTPATIENT
Start: 2019-12-20 | End: 2020-01-10

## 2019-12-20 NOTE — TELEPHONE ENCOUNTER
This rx meant to be used temporarily for sleep.  Ok for #3 weeks worth of medication - changed to 10mg tabs.  Please have pt follow up with Sanaz Medina re: this in 2-3 weeks time after the holidays.

## 2019-12-26 NOTE — TELEPHONE ENCOUNTER
Patient scheduled 1/10 with Dr Schneider at sleep clinic, scheduled for physical with YULIAV on 2/1.  Does patient need an additional appointment?    Jennifer Hernandez

## 2020-02-13 ENCOUNTER — OFFICE VISIT (OUTPATIENT)
Dept: FAMILY MEDICINE | Facility: CLINIC | Age: 65
End: 2020-02-13
Payer: COMMERCIAL

## 2020-02-13 VITALS
WEIGHT: 198 LBS | DIASTOLIC BLOOD PRESSURE: 60 MMHG | SYSTOLIC BLOOD PRESSURE: 110 MMHG | TEMPERATURE: 99 F | HEIGHT: 66 IN | BODY MASS INDEX: 31.82 KG/M2 | OXYGEN SATURATION: 98 % | HEART RATE: 80 BPM

## 2020-02-13 DIAGNOSIS — J45.21 MILD INTERMITTENT ASTHMA WITH ACUTE EXACERBATION: ICD-10-CM

## 2020-02-13 DIAGNOSIS — J01.00 ACUTE NON-RECURRENT MAXILLARY SINUSITIS: Primary | ICD-10-CM

## 2020-02-13 DIAGNOSIS — R05.9 COUGH: ICD-10-CM

## 2020-02-13 PROCEDURE — 99213 OFFICE O/P EST LOW 20 MIN: CPT | Performed by: NURSE PRACTITIONER

## 2020-02-13 RX ORDER — CODEINE PHOSPHATE/GUAIFENESIN 10-100MG/5
5 LIQUID (ML) ORAL EVERY 4 HOURS PRN
Qty: 118 ML | Refills: 0 | Status: SHIPPED | OUTPATIENT
Start: 2020-02-13

## 2020-02-13 RX ORDER — AZITHROMYCIN 250 MG/1
TABLET, FILM COATED ORAL
Qty: 6 TABLET | Refills: 0 | Status: SHIPPED | OUTPATIENT
Start: 2020-02-13

## 2020-02-13 RX ORDER — ALBUTEROL SULFATE 0.83 MG/ML
2.5 SOLUTION RESPIRATORY (INHALATION) EVERY 6 HOURS PRN
Qty: 25 VIAL | Refills: 11 | Status: SHIPPED | OUTPATIENT
Start: 2020-02-13

## 2020-02-13 ASSESSMENT — MIFFLIN-ST. JEOR: SCORE: 1474.87

## 2020-02-13 NOTE — PATIENT INSTRUCTIONS
--Saline nasal spray or a malgorzata pot can be helpful in clearing out the sinuses and making them feel better. Also, sleep propped up on an extra pillow to help with drainage.  --Using a humidifier at night will also add moisture to the air and help with symptoms.  --Guaifenesen(Mucinex  12 hour) can be used to help loosen and thin mucus. Take as directed.  --If symptoms last >10 days please return to the clinic for further evaluation as you may have a bacterial infection.  --Avoid use of other over the counter medications, ideally we want mucus to drain to prevent further infection from developing.  --Ibuprofen or Tylenol as directed is ok for headache or sinus pressure.

## 2020-02-13 NOTE — PROGRESS NOTES
"Subjective   Tasneem Chan is a 64 year old female who presents to clinic today for the following health issues:    HPI   Acute Illness   Acute illness concerns: cough / Hx of Asthma and Sinus infection  Onset: x 2 days    Fever: no    Chills/Sweats: no    Headache (location?): YES    Sinus Pressure:YES    Conjunctivitis:  no    Ear Pain: no    Rhinorrhea: no    Congestion: no    Sore Throat: no     Cough: YES-non-productive    Wheeze: no    Decreased Appetite: YES    Nausea: no    Vomiting: no    Diarrhea:  no    Dysuria/Freq.: no    Fatigue/Achiness: no    Sick/Strep Exposure: Recent Travel     Therapies Tried and outcome: Neb this AM , Tylenol    Sinus pressure is present left maxillary. This is most prominent and bothersome symptom. A bit of chills on/off temp 99 today. Feels fatigued. Dry cough. Was recently in [place] came home 3 days ago.    Reviewed and updated as needed this visit by provider:  Tobacco  Allergies  Meds  Problems  Med Hx  Surg Hx  Fam Hx         Review of Systems   Constitutional, HEENT, cardiovascular, pulmonary, GI, , musculoskeletal, neuro, skin, endocrine and psych systems are negative, except as otherwise noted per HPI.      Objective   /60   Pulse 80   Temp 99  F (37.2  C) (Tympanic)   Ht 1.676 m (5' 6\")   Wt 89.8 kg (198 lb)   SpO2 98%   Breastfeeding No   BMI 31.96 kg/m   Body mass index is 31.96 kg/m .  Physical Exam   GENERAL: healthy, alert, well nourished, well hydrated, no distress  Head: Normocephalic, atraumatic.  Eyes: Conjunctiva clear, non icteric. PERRLA.  Ears: External ears normal, bilateral TMs normal.  Nose: Septum midline, nasal mucosa erythematous, inflamed.  Sinus pain left maxillary sinus to palpation  Mouth / Throat: Normal dentition.  No oral lesions. Pharynx erythematous, no exudates, tonsils + 1 bilaterally.  Neck: Supple, anterior cervical lymphadenopathy present, posterior cervical lymphadenopathy not present, trachea midline.  RESP: " "lungs clear to auscultation - no rales, no rhonchi, no wheezes  CV: regular rates and rhythm, normal S1 S2, no S3 or S4 and no murmur, no click or rub -  MS: extremities- no gross deformities noted, no edema          Assessment & Plan   Tasneem was seen today for cough.    Diagnoses and all orders for this visit:    Acute non-recurrent maxillary sinusitis  -     azithromycin (ZITHROMAX) 250 MG tablet; Two tablets first day, then one tablet daily for four days    Mild intermittent asthma with acute exacerbation  -     albuterol (PROVENTIL) (2.5 MG/3ML) 0.083% neb solution; Take 1 vial (2.5 mg) by nebulization every 6 hours as needed for shortness of breath / dyspnea or wheezing    Cough  -     guaiFENesin-codeine (GUAIFENESIN AC) 100-10 MG/5ML syrup; Take 5 mLs by mouth every 4 hours as needed      Treating sinus infection with azithromycin today. Follow up if not improving.   Cough syrup given to help with sleep.      BMI:   Estimated body mass index is 32.27 kg/m  as calculated from the following:    Height as of 12/10/19: 1.689 m (5' 6.5\").    Weight as of 12/10/19: 92.1 kg (203 lb).           See Patient Instructions    No follow-ups on file.            NIRMAL Hernandez     [address] [address]  [email or web address]   Office: [phone]           "

## 2020-02-15 DIAGNOSIS — G89.29 CHRONIC PAIN OF LEFT KNEE: ICD-10-CM

## 2020-02-15 DIAGNOSIS — M25.562 CHRONIC PAIN OF LEFT KNEE: ICD-10-CM

## 2020-02-15 DIAGNOSIS — S99.912A LEFT ANKLE INJURY, INITIAL ENCOUNTER: ICD-10-CM

## 2020-02-17 NOTE — TELEPHONE ENCOUNTER
gabapentin (NEURONTIN) 300 MG capsule      Last Written Prescription Date:  11.28.18  Last Fill Quantity: 180 capsule,  # refills: 1   Last office visit: 2/13/2020 with prescribing provider:  Zayra Villanueva MD    Future Office Visit:        Routing refill request to provider for review/approval because:  Drug not on the [facility], [facility] or [facility] refill protocol or controlled substance

## 2020-02-18 RX ORDER — GABAPENTIN 300 MG/1
CAPSULE ORAL
Qty: 180 CAPSULE | Refills: 1 | Status: SHIPPED | OUTPATIENT
Start: 2020-02-18

## 2020-04-17 DIAGNOSIS — J45.21 INTERMITTENT ASTHMA, WITH ACUTE EXACERBATION: ICD-10-CM

## 2020-04-17 DIAGNOSIS — J45.30 MILD PERSISTENT ASTHMA WITHOUT COMPLICATION: ICD-10-CM

## 2020-04-22 NOTE — TELEPHONE ENCOUNTER
Patient is checking her insurance and coverage and will call back.     Patient prefers a phone visit and not a video visit.     Please schedule a phone visit with Dr Villanueva when patient calls back      Sanjuanita Mejia

## 2020-06-10 DIAGNOSIS — J45.30 MILD PERSISTENT ASTHMA WITHOUT COMPLICATION: ICD-10-CM

## 2020-06-10 DIAGNOSIS — J45.21 INTERMITTENT ASTHMA, WITH ACUTE EXACERBATION: ICD-10-CM

## 2020-06-10 NOTE — LETTER
[facility] [address] [id number] [phone]       July 7, 2020    Tasneem Chan  [address]  [place] [facility] [id number]      Angela,    We have been calling you regarding a recent refill request we received for Jackelyn Diaz.  Unfortunately, we were unable to reach you.  We are notifying you that you are due for a follow up visit prior to your next refill.  You can schedule this appointment via [facility] or by calling the clinic at [phone].

## 2020-06-10 NOTE — TELEPHONE ENCOUNTER
RX done - please get ACT    Advise phone/video visit follow up soon    Fasting labs soon    CPX when able

## 2020-06-10 NOTE — TELEPHONE ENCOUNTER
Routing refill request to provider for review/approval because:  ACT not current/ out of range  Laxmi Meadows RN   [facility] - Triage

## 2020-06-15 DIAGNOSIS — G43.109 MIGRAINE WITH AURA AND WITHOUT STATUS MIGRAINOSUS, NOT INTRACTABLE: ICD-10-CM

## 2020-06-15 RX ORDER — VERAPAMIL HYDROCHLORIDE 240 MG/1
TABLET, FILM COATED, EXTENDED RELEASE ORAL
Qty: 90 TABLET | Refills: 3 | Status: SHIPPED | OUTPATIENT
Start: 2020-06-15 | End: 2021-06-10

## 2020-06-15 NOTE — TELEPHONE ENCOUNTER
"Requested Prescriptions   Pending Prescriptions Disp Refills     verapamil ER (CALAN-SR) 240 MG CR tablet 90 tablet 4     Sig: TAKE 1 TABLET AT BEDTIME   Last Written Prescription Date:  12/18/2019  Last Fill Quantity: 90,  # refills: 4   Last office visit: 2/13/2020 with prescribing provider:  Sanaz Medina     Changing pharmacies. Sending other refills to           Calcium Channel Blockers Protocol  Failed - 6/15/2020 11:11 AM        Failed - Normal ALT in past 12 months     Recent Labs   Lab Test 03/22/18  1307   ALT 21             Failed - Normal serum creatinine on file in past 12 months     Recent Labs   Lab Test 03/22/18  1307   CR 0.57       Ok to refill medication if creatinine is low          Passed - Blood pressure under 140/90 in past 12 months     BP Readings from Last 3 Encounters:   02/13/20 110/60   12/10/19 108/70   08/03/19 102/72                 Passed - Recent (12 mo) or future (30 days) visit within the authorizing provider's specialty     Patient has had an office visit with the authorizing provider or a provider within the authorizing providers department within the previous 12 mos or has a future within next 30 days. See \"Patient Info\" tab in inbasket, or \"Choose Columns\" in Meds & Orders section of the refill encounter.              Passed - Medication is active on med list        Passed - Patient is age 18 or older        Passed - No active pregnancy on record        Passed - No positive pregnancy test in past 12 months           Prescription approved.      Jesus Keene RN   [facility] [place] Triage      "

## 2020-07-01 NOTE — TELEPHONE ENCOUNTER
Left non-detailed message for patient to call back.  Please schedule med check  when patient calls back.  (see previous notes for details)    Thanks Sanjuanita

## 2020-12-03 DIAGNOSIS — J45.21 INTERMITTENT ASTHMA, WITH ACUTE EXACERBATION: ICD-10-CM

## 2020-12-03 DIAGNOSIS — J45.30 MILD PERSISTENT ASTHMA WITHOUT COMPLICATION: ICD-10-CM

## 2020-12-04 NOTE — TELEPHONE ENCOUNTER
Routing refill request to provider for review/approval because:  Patient needs to be seen because:  Over due for 6 month asthma follow up. Over due for physical exam.  ACT Total Scores 4/11/2019 4/18/2019 8/3/2019   ACT TOTAL SCORE - - -   ASTHMA ER VISITS - - -   ASTHMA HOSPITALIZATIONS - - -   ACT TOTAL SCORE (Goal Greater than or Equal to 20) 17 17 16   In the past 12 months, how many times did you visit the emergency room for your asthma without being admitted to the hospital? 0 0 0   In the past 12 months, how many times were you hospitalized overnight because of your asthma? 0 0 0   Routing also to team to assist with scheduling follow up.  Nadja Cornejo RN

## 2020-12-08 NOTE — TELEPHONE ENCOUNTER
Called pt at [phone] and spoke with pt.  She was using a Breo coupon for this medication.  She was told yesterday the coupon has  and her med is now $1500 without that and she was told to try to get this changed to something else.  Scheduled phone visit for Friday and asked her if we can set up [employer] to send ACT.  I will watch for her account to be created and then will send message.  Fide Rose

## 2021-04-11 ENCOUNTER — HEALTH MAINTENANCE LETTER (OUTPATIENT)
Age: 66
End: 2021-04-11

## 2021-05-30 DIAGNOSIS — G43.109 MIGRAINE WITH AURA AND WITHOUT STATUS MIGRAINOSUS, NOT INTRACTABLE: ICD-10-CM

## 2021-05-30 NOTE — LETTER
[address] [address]  [phone]            Heather 10, 2021    Tasneem Chan                                                                                                                                                       [address]  [facility] [address]              Dear Tasneem,    We have a refill request for you, but in order to obtain your   medication you need to be seen for a physical or medication check .   Please either schedule it through [facility] or call [phone].     Thank you,  Sincerely,  [facility]

## 2021-06-01 NOTE — TELEPHONE ENCOUNTER
Pt is in need of office or virtual visit prior to refills or oleg refill.  LOV: 2/13/2020    No future appointment scheduled    Routing to [facility] to help schedule Pt.    Jesus Keene RN

## 2021-06-10 RX ORDER — VERAPAMIL HYDROCHLORIDE 240 MG/1
TABLET, FILM COATED, EXTENDED RELEASE ORAL
Qty: 30 TABLET | Refills: 0 | Status: SHIPPED | OUTPATIENT
Start: 2021-06-10

## 2021-06-10 NOTE — TELEPHONE ENCOUNTER
I have not seen patient since 1/2019.  Last office visit here at our clinic was 2/13/2020, greater than 15 months ago.  Can only refill #30 tabs.  Please assist patient in making appointment with either me or another provider in person within the next month prior to medication running out.    Patient is also overdue for physical.  Patient canceled her 11/16/2019 physical with me.  She was a no-show for physical appointment with me on 2/1/2020.  She canceled a telephone visit with me on 12/11/2020.      Please assist pt in making appt(s) for the above.   Okay to separate out med check from annual physical exam.

## 2021-09-25 ENCOUNTER — HEALTH MAINTENANCE LETTER (OUTPATIENT)
Age: 66
End: 2021-09-25

## 2022-05-07 ENCOUNTER — HEALTH MAINTENANCE LETTER (OUTPATIENT)
Age: 67
End: 2022-05-07

## 2022-12-05 ENCOUNTER — TELEPHONE (OUTPATIENT)
Dept: FAMILY MEDICINE | Facility: CLINIC | Age: 67
End: 2022-12-05

## 2022-12-05 NOTE — TELEPHONE ENCOUNTER
Pt is no longer under Dr. Villanueva. Moved to [facility]. UPdated Epic to reflect this change as well.

## 2022-12-05 NOTE — TELEPHONE ENCOUNTER
I received this refill request, but I am not sure what it is for.  Can you look into this?    Thanks,  Geraldo

## 2023-01-07 ENCOUNTER — HEALTH MAINTENANCE LETTER (OUTPATIENT)
Age: 68
End: 2023-01-07

## 2023-01-27 NOTE — LETTER
[facility] - [address] [id number] [phone]    April 17, 2019    Tasneem Chan  [address]  [facility] [address]      To Whom it May Concern:    My staff have been attempting to reach you in regards to a recent refill request for: montelukast (SINGULAIR) 10 MG tablet.    After reviewing the Asthma Control Test Questionnaire that you completed, have you been using the Advair 250/50 1 inhalation daily or twice daily?  If so, then we need to change up your medications. Have you been out of the Singulair?  If you would like I can place a referral to pulmonary medicine and change to Breo Ellipta instead of the Advair.   Do you feel that you need a course of oral prednisone to help calm things down in the mean time?    Also, you are due for a fasting physical.     Please contact my office at [phone] to discuss further.     Thank you for your time.        Sincerely,       Zayra Villanueva M.D./JOAQUÍN, RN     Statement Selected

## 2023-04-22 ENCOUNTER — HEALTH MAINTENANCE LETTER (OUTPATIENT)
Age: 68
End: 2023-04-22

## 2023-08-16 NOTE — TELEPHONE ENCOUNTER
"Requested Prescriptions   Pending Prescriptions Disp Refills     gabapentin (NEURONTIN) 300 MG capsule [Pharmacy Med Name: GABAPENTIN CAPS 300MG] 180 capsule 1    gabapentin (NEURONTIN) 300 MG capsule        Last Written Prescription Date:  11.28.18  Last Fill Quantity: 180,  # refills: 1   Last Office Visit: 10/26/2018   Future Office Visit:       Routing refill request to provider for review/approval because:  Drug not on the [facility], [facility] or [facility] refill protocol or controlled substance   Sig: TAKE 1 CAPSULE TWICE A DAY    There is no refill protocol information for this order        albuterol (PROAIR HFA) 108 (90 Base) MCG/ACT inhaler [Pharmacy Med Name: PROAIR HFA INH 8.5GM W/COUNT 90MCG] 25.5 g 1        Last Written Prescription Date:  10.5.17  Last Fill Quantity: 25.5 g,  # refills: 11   Last Office Visit: 10/26/2018   Future Office Visit:       PHQ-9 SCORE 5/8/2017 6/5/2018 6/25/2018   PHQ-9 Total Score 4 11 10     IJEOMA-7 SCORE 5/8/2017 6/5/2018 6/25/2018   Total Score 14 18 13          Sig: USE 1 TO 2 INHALATIONS EVERY 4 TO 6 HOURS AS NEEDED    Asthma Maintenance Inhalers - Anticholinergics Failed - 12/28/2018  9:21 AM       Failed - Asthma control assessment score within normal limits in last 6 months    Please review ACT score.          Passed - Patient is age 12 years or older       Passed - Recent (6 mo) or future (30 days) visit within the authorizing provider's specialty    Patient had office visit in the last 6 months or has a visit in the next 30 days with authorizing provider or within the authorizing provider's specialty.  See \"Patient Info\" tab in inbasket, or \"Choose Columns\" in Meds & Orders section of the refill encounter.            SUMAtriptan (IMITREX) 100 MG tablet [Pharmacy Med Name: SUMATRIPTAN TABS 9'S 100MG] 27 tablet 1      Last Written Prescription Date:  3.8.18  Last Fill Quantity: 27,  # refills: 1   Last Office Visit: 10/26/2018   Future Office Visit:      Sig: TAKE AS INSTRUCTED BY " "YOUR PRESCRIBER    Serotonin Agonists Failed - 12/28/2018  9:21 AM       Failed - Serotonin Agonist request needs review.    Please review patient's record. If patient has had 8 or more treatments in the past month, please forward to provider.         Passed - Blood pressure under 140/90 in past 12 months    BP Readings from Last 3 Encounters:   11/02/18 121/60   10/26/18 102/72   07/02/18 118/70                Passed - Recent (12 mo) or future (30 days) visit within the authorizing provider's specialty    Patient had office visit in the last 12 months or has a visit in the next 30 days with authorizing provider or within the authorizing provider's specialty.  See \"Patient Info\" tab in inbasket, or \"Choose Columns\" in Meds & Orders section of the refill encounter.             Passed - Patient is age 18 or older       Passed - No active pregnancy on record       Passed - No positive pregnancy test in past 12 months          " Yes

## 2024-02-10 ENCOUNTER — HEALTH MAINTENANCE LETTER (OUTPATIENT)
Age: 69
End: 2024-02-10

## 2024-06-17 PROBLEM — Z71.89 ADVANCED DIRECTIVES, COUNSELING/DISCUSSION: Status: RESOLVED | Noted: 2017-02-07 | Resolved: 2024-06-17
